# Patient Record
Sex: MALE | Race: WHITE | NOT HISPANIC OR LATINO | Employment: OTHER | URBAN - METROPOLITAN AREA
[De-identification: names, ages, dates, MRNs, and addresses within clinical notes are randomized per-mention and may not be internally consistent; named-entity substitution may affect disease eponyms.]

---

## 2017-01-27 PROBLEM — E78.5 DYSLIPIDEMIA: Status: ACTIVE | Noted: 2017-01-27

## 2017-02-13 ENCOUNTER — OFFICE VISIT (OUTPATIENT)
Dept: FAMILY MEDICINE CLINIC | Facility: CLINIC | Age: 62
End: 2017-02-13

## 2017-02-13 VITALS
WEIGHT: 204 LBS | DIASTOLIC BLOOD PRESSURE: 78 MMHG | SYSTOLIC BLOOD PRESSURE: 112 MMHG | HEART RATE: 74 BPM | HEIGHT: 72 IN | OXYGEN SATURATION: 98 % | BODY MASS INDEX: 27.63 KG/M2

## 2017-02-13 DIAGNOSIS — F98.8 ADD (ATTENTION DEFICIT DISORDER): Primary | ICD-10-CM

## 2017-02-13 PROCEDURE — 99212 OFFICE O/P EST SF 10 MIN: CPT | Performed by: INTERNAL MEDICINE

## 2017-02-13 RX ORDER — DEXTROAMPHETAMINE SACCHARATE, AMPHETAMINE ASPARTATE MONOHYDRATE, DEXTROAMPHETAMINE SULFATE AND AMPHETAMINE SULFATE 1.25; 1.25; 1.25; 1.25 MG/1; MG/1; MG/1; MG/1
5 CAPSULE, EXTENDED RELEASE ORAL 2 TIMES DAILY
Qty: 60 CAPSULE
Start: 2017-02-13 | End: 2017-07-20 | Stop reason: SDUPTHER

## 2017-02-13 NOTE — PROGRESS NOTES
Subjective   Luca Caorlina Jr. is a 61 y.o. male who presents today for:    ADD (CSE 4 month f/u )    History of Present Illness   ADD follow-up: He was diagnosed after undergoing neuropsychiatric testing in 2007. After being tried on other stimulants, Adderall 10 mg seemed to work well with no side effects. More recently, he felt that the medication was making him more wired and he would like. He thought he was on a 5 mg tablet once or twice daily; he has always been on Adderall 10 mg daily-twice a day. The second dose is taken as much on a prn basis as a regular basis, depending on his need for improved focus and concentration at work. He denies insomnia.      We added sertraline in September, 2010 for mood lability and irritability. He noticed a benefit in particular when he would run out of the medicine, with a recurrence of symptoms off the sertraline.     Mr. Carolina  reports that he has never smoked. He has never used smokeless tobacco. He reports that he drinks about 3.0 oz of alcohol per week  He reports that he does not use illicit drugs.         Current Outpatient Prescriptions:   •  amphetamine-dextroamphetamine XR (ADDERALL XR) 5 MG 24 hr capsule, Take 1 capsule by mouth 2 (Two) Times a Day., Disp: 60 capsule, Rfl: 0  •  amphetamine-dextroamphetamine XR (ADDERALL XR) 5 MG 24 hr capsule, Take 1 capsule by mouth 2 (Two) Times a Day., Disp: 60 capsule, Rfl: 0  •  atorvastatin (LIPITOR) 20 MG tablet, TAKE 1 TABLET BY MOUTH DAILY, Disp: 90 tablet, Rfl: 1  •  ibuprofen (ADVIL,MOTRIN) 200 MG tablet, Take 200 mg by mouth every 6 (six) hours as needed for mild pain (1-3)., Disp: , Rfl:   •  Loratadine 10 MG capsule, Take  by mouth., Disp: , Rfl:   •  sertraline (ZOLOFT) 25 MG tablet, TAKE 1 TABLET BY MOUTH DAILY, Disp: 30 tablet, Rfl: 6  •  Triamcinolone Acetonide (NASACORT) 55 MCG/ACT nasal inhaler, 2 sprays into each nostril daily., Disp: , Rfl:       The following portions of the patient's history were  "reviewed and updated as appropriate: allergies, current medications, past social history and problem list.    Review of Systems   Constitutional: Negative for diaphoresis.   Eyes: Negative for visual disturbance.   Respiratory: Negative for chest tightness and stridor.    Cardiovascular: Negative for chest pain and palpitations.   Gastrointestinal: Negative for abdominal pain.   Musculoskeletal: Negative for myalgias.   Neurological: Negative for dizziness, syncope, numbness and headaches.   Hematological: Does not bruise/bleed easily.   Psychiatric/Behavioral: Negative for sleep disturbance.         Objective   Vitals:    02/13/17 1140   BP: 112/78   BP Location: Left arm   Patient Position: Sitting   Cuff Size: Adult   Pulse: 74   SpO2: 98%   Weight: 204 lb (92.5 kg)   Height: 72\" (182.9 cm)     Physical Exam   Constitutional: He is oriented to person, place, and time. He appears well-developed and well-nourished.   Eyes: Conjunctivae are normal. No scleral icterus.   Neck: Carotid bruit is not present. No thyromegaly present.   Cardiovascular: Normal rate and regular rhythm.    Neurological: He is alert and oriented to person, place, and time. Coordination and gait normal.   Psychiatric: He has a normal mood and affect. His speech is normal. Judgment and thought content normal. Cognition and memory are normal.       Assessment/Plan   Luca was seen today for add.    Diagnoses and all orders for this visit:    ADD (attention deficit disorder)  -     amphetamine-dextroamphetamine XR (ADDERALL XR) 5 MG 24 hr capsule; Take 1 capsule by mouth 2 (Two) Times a Day.    MALLORIE report was obtained and reviewed during the course of today's office visit.  He seems to be doing well on the current dose of Adderall XR.  We will continue it, and the sertraline, unchanged.  2 prescriptions for Adderall XR 5 mg, #60/0 refills were given to the patient today.  "

## 2017-02-23 RX ORDER — SERTRALINE HYDROCHLORIDE 25 MG/1
TABLET, FILM COATED ORAL
Qty: 30 TABLET | Refills: 12 | Status: SHIPPED | OUTPATIENT
Start: 2017-02-23 | End: 2018-04-16 | Stop reason: SDUPTHER

## 2017-05-30 RX ORDER — ATORVASTATIN CALCIUM 20 MG/1
TABLET, FILM COATED ORAL
Qty: 90 TABLET | Refills: 0 | Status: SHIPPED | OUTPATIENT
Start: 2017-05-30 | End: 2017-09-07 | Stop reason: SDUPTHER

## 2017-07-20 ENCOUNTER — OFFICE VISIT (OUTPATIENT)
Dept: FAMILY MEDICINE CLINIC | Facility: CLINIC | Age: 62
End: 2017-07-20

## 2017-07-20 VITALS
OXYGEN SATURATION: 98 % | BODY MASS INDEX: 25.92 KG/M2 | WEIGHT: 191.4 LBS | HEART RATE: 71 BPM | DIASTOLIC BLOOD PRESSURE: 70 MMHG | HEIGHT: 72 IN | SYSTOLIC BLOOD PRESSURE: 112 MMHG

## 2017-07-20 DIAGNOSIS — Z79.899 ENCOUNTER FOR LONG-TERM (CURRENT) USE OF MEDICATIONS: ICD-10-CM

## 2017-07-20 DIAGNOSIS — F98.8 ADD (ATTENTION DEFICIT DISORDER): Primary | ICD-10-CM

## 2017-07-20 PROCEDURE — 99213 OFFICE O/P EST LOW 20 MIN: CPT | Performed by: INTERNAL MEDICINE

## 2017-07-20 RX ORDER — DEXTROAMPHETAMINE SACCHARATE, AMPHETAMINE ASPARTATE MONOHYDRATE, DEXTROAMPHETAMINE SULFATE AND AMPHETAMINE SULFATE 1.25; 1.25; 1.25; 1.25 MG/1; MG/1; MG/1; MG/1
5 CAPSULE, EXTENDED RELEASE ORAL 2 TIMES DAILY
Qty: 60 CAPSULE | Refills: 0 | Status: SHIPPED | OUTPATIENT
Start: 2017-07-20 | End: 2017-12-01 | Stop reason: SDUPTHER

## 2017-07-20 RX ORDER — DEXTROAMPHETAMINE SACCHARATE, AMPHETAMINE ASPARTATE MONOHYDRATE, DEXTROAMPHETAMINE SULFATE AND AMPHETAMINE SULFATE 1.25; 1.25; 1.25; 1.25 MG/1; MG/1; MG/1; MG/1
5 CAPSULE, EXTENDED RELEASE ORAL 2 TIMES DAILY
Qty: 60 CAPSULE | Refills: 0 | Status: SHIPPED | OUTPATIENT
Start: 2017-07-20 | End: 2017-07-20 | Stop reason: SDUPTHER

## 2017-07-20 NOTE — PROGRESS NOTES
Chief Complaint   Patient presents with   • ADD     CSE   ADD follow-up: He was diagnosed after undergoing neuropsychiatric testing in 2007. After being tried on other stimulants, Adderall 10 mg seemed to work well with no side effects. He felt that the medication was making him more wired than he would like. He thought he was on a 5 mg tablet once or twice daily; he has always been on Adderall 10 mg daily-twice a day. The second dose is taken as much on a prn basis as a regular basis, depending on his need for improved focus and concentration at work. He denies insomnia.      We added sertraline in September, 2010, for mood lability and irritability. He noticed a benefit in particular when he would run out of the medicine, with a recurrence of symptoms off the sertraline.   He ran out of medicine a couple of weeks ago with a noticeable decline in focus and increase in agitation/frustration/impatience because of it.    ROS: Weight loss by design, with decreased back pain.    Allergies   Allergen Reactions   • Penicillins Nausea Only     QUESTIONABLE REACTION       Current Outpatient Prescriptions:   •  amphetamine-dextroamphetamine XR (ADDERALL XR) 5 MG 24 hr capsule, Take 1 capsule by mouth 2 (Two) Times a Day., Disp: 60 capsule, Rfl:   •  atorvastatin (LIPITOR) 20 MG tablet, TAKE 1 TABLET BY MOUTH DAILY, Disp: 90 tablet, Rfl: 0  •  ibuprofen (ADVIL,MOTRIN) 200 MG tablet, Take 200 mg by mouth every 6 (six) hours as needed for mild pain (1-3)., Disp: , Rfl:   •  Loratadine 10 MG capsule, Take  by mouth., Disp: , Rfl:   •  sertraline (ZOLOFT) 25 MG tablet, TAKE 1 TABLET BY MOUTH DAILY, Disp: 30 tablet, Rfl: 12  •  Triamcinolone Acetonide (NASACORT) 55 MCG/ACT nasal inhaler, 2 sprays into each nostril daily., Disp: , Rfl:     He  reports that he has never smoked. He has never used smokeless tobacco. He reports that he drinks about 3.0 oz of alcohol per week  He reports that he does not use illicit drugs.    No family  "h/o substance abuse.    /70 (BP Location: Left arm, Patient Position: Sitting, Cuff Size: Adult)  Pulse 71  Ht 72\" (182.9 cm)  Wt 191 lb 6.4 oz (86.8 kg)  SpO2 98%  BMI 25.96 kg/m2    EXAM:  I'll developed, well nourished, in no acute distress.  Sclerae anicteric.  No periorbital edema.  No thyromegaly or masses or carotid bruit.  Regular rate and rhythm without murmur.  Normal S1 and S2.  No lower extremity edema.    No abdominal bruit, no hepatomegaly, and no abdominal tenderness.    Luca was seen today for add.    Diagnoses and all orders for this visit:    ADD (attention deficit disorder)  -     amphetamine-dextroamphetamine XR (ADDERALL XR) 5 MG 24 hr capsule; Take 1 capsule by mouth 2 (Two) Times a Day.  -     [id number] 7+OxycodoneWayne    Encounter for long-term (current) use of medications  -     [id number] 7+Oxycodone-Josep NOBLES report was obtained and reviewed.  He will continue with the same dose and same regimen of this medication since it has shown good benefit.  This has been most recently evidenced by the lack of benefit when he has not been on it.     We will have him follow up later this year with a preventive health visit and refill his Adderall at that time.      "

## 2017-07-21 LAB
AMPHETAMINES UR QL SCN: NEGATIVE NG/ML
BARBITURATES UR QL SCN: NEGATIVE NG/ML
BENZODIAZ UR QL: NEGATIVE NG/ML
BZE UR QL: NEGATIVE NG/ML
CANNABINOIDS UR QL SCN: NEGATIVE NG/ML
OPIATES UR QL SCN: NEGATIVE NG/ML
OXYCODONE+OXYMORPHONE UR QL SCN: NEGATIVE NG/ML
PCP UR QL: NEGATIVE NG/ML

## 2017-09-07 DIAGNOSIS — E78.2 MIXED HYPERLIPIDEMIA: Primary | ICD-10-CM

## 2017-09-07 RX ORDER — ATORVASTATIN CALCIUM 20 MG/1
TABLET, FILM COATED ORAL
Qty: 90 TABLET | Refills: 0 | Status: SHIPPED | OUTPATIENT
Start: 2017-09-07 | End: 2018-01-03 | Stop reason: SDUPTHER

## 2017-12-01 ENCOUNTER — OFFICE VISIT (OUTPATIENT)
Dept: FAMILY MEDICINE CLINIC | Facility: CLINIC | Age: 62
End: 2017-12-01

## 2017-12-01 ENCOUNTER — RESULTS ENCOUNTER (OUTPATIENT)
Dept: FAMILY MEDICINE CLINIC | Facility: CLINIC | Age: 62
End: 2017-12-01

## 2017-12-01 VITALS
BODY MASS INDEX: 25.92 KG/M2 | SYSTOLIC BLOOD PRESSURE: 116 MMHG | WEIGHT: 191.4 LBS | DIASTOLIC BLOOD PRESSURE: 82 MMHG | HEIGHT: 72 IN | HEART RATE: 68 BPM | OXYGEN SATURATION: 98 %

## 2017-12-01 DIAGNOSIS — F98.8 ATTENTION DEFICIT DISORDER (ADD) WITHOUT HYPERACTIVITY: Primary | ICD-10-CM

## 2017-12-01 DIAGNOSIS — Z23 FLU VACCINE NEED: ICD-10-CM

## 2017-12-01 DIAGNOSIS — E78.2 MIXED HYPERLIPIDEMIA: ICD-10-CM

## 2017-12-01 PROCEDURE — 99213 OFFICE O/P EST LOW 20 MIN: CPT | Performed by: INTERNAL MEDICINE

## 2017-12-01 PROCEDURE — 90471 IMMUNIZATION ADMIN: CPT | Performed by: INTERNAL MEDICINE

## 2017-12-01 PROCEDURE — 90686 IIV4 VACC NO PRSV 0.5 ML IM: CPT | Performed by: INTERNAL MEDICINE

## 2017-12-01 RX ORDER — DEXTROAMPHETAMINE SACCHARATE, AMPHETAMINE ASPARTATE MONOHYDRATE, DEXTROAMPHETAMINE SULFATE AND AMPHETAMINE SULFATE 1.25; 1.25; 1.25; 1.25 MG/1; MG/1; MG/1; MG/1
5 CAPSULE, EXTENDED RELEASE ORAL 2 TIMES DAILY
Qty: 120 CAPSULE | Refills: 0 | Status: SHIPPED | OUTPATIENT
Start: 2017-12-01 | End: 2018-01-05 | Stop reason: SDUPTHER

## 2017-12-01 RX ORDER — DEXTROAMPHETAMINE SACCHARATE, AMPHETAMINE ASPARTATE MONOHYDRATE, DEXTROAMPHETAMINE SULFATE AND AMPHETAMINE SULFATE 1.25; 1.25; 1.25; 1.25 MG/1; MG/1; MG/1; MG/1
5 CAPSULE, EXTENDED RELEASE ORAL 2 TIMES DAILY
Qty: 30 CAPSULE | Refills: 0 | Status: SHIPPED | OUTPATIENT
Start: 2017-12-01 | End: 2017-12-01 | Stop reason: SDUPTHER

## 2017-12-02 LAB
ALBUMIN SERPL-MCNC: 4.8 G/DL (ref 3.6–4.8)
ALBUMIN/GLOB SERPL: 2.5 {RATIO} (ref 1.2–2.2)
ALP SERPL-CCNC: 57 IU/L (ref 39–117)
ALT SERPL-CCNC: 18 IU/L (ref 0–44)
AST SERPL-CCNC: 23 IU/L (ref 0–40)
BILIRUB SERPL-MCNC: 0.8 MG/DL (ref 0–1.2)
BUN SERPL-MCNC: 11 MG/DL (ref 8–27)
BUN/CREAT SERPL: 14 (ref 10–24)
CALCIUM SERPL-MCNC: 9.5 MG/DL (ref 8.6–10.2)
CHLORIDE SERPL-SCNC: 99 MMOL/L (ref 96–106)
CHOLEST SERPL-MCNC: 217 MG/DL (ref 100–199)
CO2 SERPL-SCNC: 26 MMOL/L (ref 18–29)
CREAT SERPL-MCNC: 0.81 MG/DL (ref 0.76–1.27)
GFR SERPLBLD CREATININE-BSD FMLA CKD-EPI: 110 ML/MIN/1.73
GFR SERPLBLD CREATININE-BSD FMLA CKD-EPI: 95 ML/MIN/1.73
GLOBULIN SER CALC-MCNC: 1.9 G/DL (ref 1.5–4.5)
GLUCOSE SERPL-MCNC: 93 MG/DL (ref 65–99)
HDLC SERPL-MCNC: 66 MG/DL
LDLC SERPL CALC-MCNC: 133 MG/DL (ref 0–99)
POTASSIUM SERPL-SCNC: 4.1 MMOL/L (ref 3.5–5.2)
PROT SERPL-MCNC: 6.7 G/DL (ref 6–8.5)
SODIUM SERPL-SCNC: 141 MMOL/L (ref 134–144)
TRIGL SERPL-MCNC: 91 MG/DL (ref 0–149)
VLDLC SERPL CALC-MCNC: 18 MG/DL (ref 5–40)

## 2018-01-03 RX ORDER — ATORVASTATIN CALCIUM 20 MG/1
TABLET, FILM COATED ORAL
Qty: 90 TABLET | Refills: 0 | Status: SHIPPED | OUTPATIENT
Start: 2018-01-03 | End: 2018-04-13 | Stop reason: SDUPTHER

## 2018-01-05 ENCOUNTER — OFFICE VISIT (OUTPATIENT)
Dept: FAMILY MEDICINE CLINIC | Facility: CLINIC | Age: 63
End: 2018-01-05

## 2018-01-05 VITALS
WEIGHT: 194 LBS | OXYGEN SATURATION: 97 % | HEIGHT: 72 IN | BODY MASS INDEX: 26.28 KG/M2 | SYSTOLIC BLOOD PRESSURE: 126 MMHG | DIASTOLIC BLOOD PRESSURE: 92 MMHG | HEART RATE: 70 BPM

## 2018-01-05 DIAGNOSIS — F98.8 ATTENTION DEFICIT DISORDER (ADD) WITHOUT HYPERACTIVITY: ICD-10-CM

## 2018-01-05 DIAGNOSIS — E78.5 DYSLIPIDEMIA: ICD-10-CM

## 2018-01-05 DIAGNOSIS — Z12.5 PROSTATE CANCER SCREENING: ICD-10-CM

## 2018-01-05 DIAGNOSIS — Z00.00 ROUTINE GENERAL MEDICAL EXAMINATION AT HEALTH CARE FACILITY: Primary | ICD-10-CM

## 2018-01-05 PROCEDURE — 99396 PREV VISIT EST AGE 40-64: CPT | Performed by: INTERNAL MEDICINE

## 2018-01-05 RX ORDER — DEXTROAMPHETAMINE SACCHARATE, AMPHETAMINE ASPARTATE MONOHYDRATE, DEXTROAMPHETAMINE SULFATE AND AMPHETAMINE SULFATE 1.25; 1.25; 1.25; 1.25 MG/1; MG/1; MG/1; MG/1
10 CAPSULE, EXTENDED RELEASE ORAL EVERY MORNING
Qty: 60 CAPSULE | Refills: 0 | Status: SHIPPED | OUTPATIENT
Start: 2018-01-05 | End: 2018-05-02 | Stop reason: SDUPTHER

## 2018-01-05 RX ORDER — DEXTROAMPHETAMINE SACCHARATE, AMPHETAMINE ASPARTATE MONOHYDRATE, DEXTROAMPHETAMINE SULFATE AND AMPHETAMINE SULFATE 1.25; 1.25; 1.25; 1.25 MG/1; MG/1; MG/1; MG/1
10 CAPSULE, EXTENDED RELEASE ORAL EVERY MORNING
Qty: 60 CAPSULE | Refills: 0 | Status: SHIPPED | OUTPATIENT
Start: 2018-01-05 | End: 2018-01-05 | Stop reason: SDUPTHER

## 2018-01-05 NOTE — PROGRESS NOTES
Subjective   Luca Carolina Jr. is a 62 y.o. male who presents today for:    Annual Exam (PHE & review labs)    History of Present Illness        Immunization History   Administered Date(s) Administered   • Flu Vaccine Quad PF >36MO 12/01/2017   • Influenza TIV (IM) 10/06/2016   • Zoster 12/15/2015     He was diagnosed after undergoing neuropsychiatric testing in 2007. After being tried on other stimulants, Adderall seemed to work well with no side effects. He felt that the medication was making him more wired than he would like. We changed to the 5 mg dose with good benefit, but he needed a second dose to get through afternoon and evening meetings (prn).  He denies insomnia.    Mr. Carolina  reports that he has never smoked. He has never used smokeless tobacco. He reports that he drinks about 3.0 oz of alcohol per week  He reports that he does not use illicit drugs.     Allergies   Allergen Reactions   • Penicillins Nausea Only     QUESTIONABLE REACTION         Current Outpatient Prescriptions:   •  amphetamine-dextroamphetamine XR (ADDERALL XR) 5 MG 24 hr capsule, Take 1 capsule by mouth 2 (Two) Times a Day., Disp: 120 capsule, Rfl: 0  •  atorvastatin (LIPITOR) 20 MG tablet, TAKE 1 TABLET BY MOUTH DAILY, Disp: 90 tablet, Rfl: 0  •  ibuprofen (ADVIL,MOTRIN) 200 MG tablet, Take 200 mg by mouth every 6 (six) hours as needed for mild pain (1-3)., Disp: , Rfl:   •  Loratadine 10 MG capsule, Take  by mouth., Disp: , Rfl:   •  sertraline (ZOLOFT) 25 MG tablet, TAKE 1 TABLET BY MOUTH DAILY, Disp: 30 tablet, Rfl: 12  •  Triamcinolone Acetonide (NASACORT) 55 MCG/ACT nasal inhaler, 2 sprays into each nostril daily., Disp: , Rfl:       Review of Systems   Constitutional: Negative for diaphoresis.   HENT: Positive for trouble swallowing (large capsules and some foods).    Eyes: Negative for visual disturbance.   Respiratory: Negative for cough and chest tightness.    Cardiovascular: Negative for chest pain, palpitations and leg  "swelling.   Gastrointestinal: Negative for abdominal pain.   Genitourinary:        Nocturia x 3-4 nights/week.   Musculoskeletal: Negative for arthralgias and myalgias.   Neurological: Negative for dizziness, syncope, weakness, numbness and headaches.   Hematological: Does not bruise/bleed easily.   Psychiatric/Behavioral: Positive for decreased concentration. Negative for dysphoric mood. The patient is nervous/anxious.          Objective   Vitals:    01/05/18 1033   BP: 126/92   BP Location: Left arm   Patient Position: Sitting   Cuff Size: Adult   Pulse: 70   SpO2: 97%   Weight: 88 kg (194 lb)   Height: 182.9 cm (72\")     Recheck BP: 124/82    Physical Exam   Constitutional: He is oriented to person, place, and time. He appears well-developed and well-nourished.   Eyes: Conjunctivae are normal. No scleral icterus.   Neck: Carotid bruit is not present. No thyroid mass and no thyromegaly present.   Cardiovascular: Normal rate, regular rhythm, normal heart sounds and intact distal pulses.    No pedal edema.   Pulmonary/Chest: Effort normal and breath sounds normal.   Abdominal: Soft. Bowel sounds are normal. He exhibits no abdominal bruit.   Genitourinary: Rectum normal and prostate normal.   Lymphadenopathy:     He has no cervical adenopathy.   Neurological: He is alert and oriented to person, place, and time. He has normal strength.   Skin:   Moderate sun-exposure changes on the trunk and extremities, but no suspicious lesions noted.     Psychiatric: He has a normal mood and affect. His behavior is normal. Judgment and thought content normal.         Assessment/Plan   Luca was seen today for annual exam.    Diagnoses and all orders for this visit:    Routine general medical examination at health care facility    Prostate cancer screening    Attention deficit disorder (ADD) without hyperactivity  -     amphetamine-dextroamphetamine XR (ADDERALL XR) 5 MG 24 hr capsule; Take 2 capsules by mouth Every " Morning    Dyslipidemia    We discussed age-appropriate health maintenance issues and reviewed labs that were done in anticipation of today's office visit.  We will track down the results of his most recent colonoscopy and provide further guidance regarding the timing of the next one.  He will try to provide us with a copy of his immunization record from the [facility].    He is doing well on the Adderall.  2 prescriptions were given to him today.  He will call when he needs his third.  MALLORIE report was obtained and reviewed.

## 2018-04-16 RX ORDER — ATORVASTATIN CALCIUM 20 MG/1
TABLET, FILM COATED ORAL
Qty: 90 TABLET | Refills: 0 | Status: SHIPPED | OUTPATIENT
Start: 2018-04-16 | End: 2018-07-18 | Stop reason: SDUPTHER

## 2018-04-17 RX ORDER — SERTRALINE HYDROCHLORIDE 25 MG/1
TABLET, FILM COATED ORAL
Qty: 90 TABLET | Refills: 0 | Status: SHIPPED | OUTPATIENT
Start: 2018-04-17 | End: 2018-07-18 | Stop reason: SDUPTHER

## 2018-05-02 ENCOUNTER — OFFICE VISIT (OUTPATIENT)
Dept: FAMILY MEDICINE CLINIC | Facility: CLINIC | Age: 63
End: 2018-05-02

## 2018-05-02 VITALS
DIASTOLIC BLOOD PRESSURE: 64 MMHG | OXYGEN SATURATION: 98 % | HEART RATE: 77 BPM | HEIGHT: 72 IN | BODY MASS INDEX: 26.05 KG/M2 | SYSTOLIC BLOOD PRESSURE: 106 MMHG | WEIGHT: 192.3 LBS

## 2018-05-02 DIAGNOSIS — F98.8 ATTENTION DEFICIT DISORDER (ADD) WITHOUT HYPERACTIVITY: Primary | ICD-10-CM

## 2018-05-02 DIAGNOSIS — M54.50 ACUTE LEFT-SIDED LOW BACK PAIN WITHOUT SCIATICA: ICD-10-CM

## 2018-05-02 DIAGNOSIS — Z79.899 ENCOUNTER FOR LONG-TERM (CURRENT) USE OF MEDICATIONS: ICD-10-CM

## 2018-05-02 PROCEDURE — 99213 OFFICE O/P EST LOW 20 MIN: CPT | Performed by: INTERNAL MEDICINE

## 2018-05-02 RX ORDER — DEXTROAMPHETAMINE SACCHARATE, AMPHETAMINE ASPARTATE MONOHYDRATE, DEXTROAMPHETAMINE SULFATE AND AMPHETAMINE SULFATE 1.25; 1.25; 1.25; 1.25 MG/1; MG/1; MG/1; MG/1
10 CAPSULE, EXTENDED RELEASE ORAL EVERY MORNING
Qty: 60 CAPSULE | Refills: 0 | Status: SHIPPED | OUTPATIENT
Start: 2018-05-02 | End: 2018-05-02 | Stop reason: SDUPTHER

## 2018-05-02 RX ORDER — DEXTROAMPHETAMINE SACCHARATE, AMPHETAMINE ASPARTATE MONOHYDRATE, DEXTROAMPHETAMINE SULFATE AND AMPHETAMINE SULFATE 1.25; 1.25; 1.25; 1.25 MG/1; MG/1; MG/1; MG/1
10 CAPSULE, EXTENDED RELEASE ORAL EVERY MORNING
Qty: 60 CAPSULE | Refills: 0 | Status: SHIPPED | OUTPATIENT
Start: 2018-05-02 | End: 2018-07-12 | Stop reason: SDUPTHER

## 2018-05-02 NOTE — PROGRESS NOTES
"ADD (CSE) and Back Pain (Left side)    He was diagnosed with ADD after undergoing neuropsychiatric testing in 2007. After being tried on other stimulants, Adderall seemed to work well with no side effects. He felt that the medication was making him more wired than he would like. We changed to the 5 mg dose with good benefit, but he needed a second dose to get through afternoon and evening meetings (prn).  He denies insomnia.    He c/o low back pain with radiation to the left thigh and buttock.  It is worse when sleeping on the left side and has been fairly constant for the last 6 weeks.  It has been an issue for several years, but more intermittently.  Taking Advil qod.  Topical remedy also helped him sleep one night.    ROS:   No leg weakness.  No LE numbness or tingling.  No palpitations.     Allergies   Allergen Reactions   • Penicillins Nausea Only     QUESTIONABLE REACTION       Current Outpatient Prescriptions:   •  amphetamine-dextroamphetamine XR (ADDERALL XR) 5 MG 24 hr capsule, Take 2 capsules by mouth Every Morning, Disp: 60 capsule, Rfl: 0  •  atorvastatin (LIPITOR) 20 MG tablet, TAKE 1 TABLET BY MOUTH DAILY, Disp: 90 tablet, Rfl: 0  •  ibuprofen (ADVIL,MOTRIN) 200 MG tablet, Take 200 mg by mouth every 6 (six) hours as needed for mild pain (1-3)., Disp: , Rfl:   •  Loratadine 10 MG capsule, Take  by mouth., Disp: , Rfl:   •  sertraline (ZOLOFT) 25 MG tablet, TAKE 1 TABLET BY MOUTH DAILY, Disp: 90 tablet, Rfl: 0  •  Triamcinolone Acetonide (NASACORT) 55 MCG/ACT nasal inhaler, 2 sprays into each nostril daily., Disp: , Rfl:     /64 (BP Location: Left arm, Patient Position: Sitting, Cuff Size: Large Adult)   Pulse 77   Ht 182.9 cm (72\")   Wt 87.2 kg (192 lb 4.8 oz)   SpO2 98%   BMI 26.08 kg/m²     EXAM:    Well-developed, well-nourished, in no acute distress.  Mood is upbeat and affect is appropriate.  Regular rate and rhythm. No murmur appreciated.  Insight and judgment are intact.  Nontender to " palpation directly over the spine in the lumbar area. Mildly tender over the SI areas bilaterally. Mildly tender over the lumbosacral muscles. Loss of lumbar lordosis noted.  Tight hamstrings bilaterally.  No pain with range of motion of the lumbar spine nor at the hips bilaterally.  Straight leg raise is negative and bowstring sign is negative bilaterally. Lower extremity strength is 5+/5 bilaterally.        Luca was seen today for add and back pain.    Diagnoses and all orders for this visit:    Attention deficit disorder (ADD) without hyperactivity  -     amphetamine-dextroamphetamine XR (ADDERALL XR) 5 MG 24 hr capsule; Take 2 capsules by mouth Every Morning  Continue the Adderall unchanged. This appears to be working well at the current dose/regimen.    Encounter for long-term (current) use of medications  UDT ordered today.  MALLORIE report was obtained and reviewed.    Acute left-sided low back pain without sciatica  -     Ambulatory Referral to Physical Therapy Evaluate and treat  Stretches demonstrated for the patient today. He should perform those on a daily basis. He should avoid strengthening exercises until we get the pain better controlled.

## 2018-05-03 LAB
AMPHETAMINES UR QL SCN: NEGATIVE NG/ML
BARBITURATES UR QL SCN: NEGATIVE NG/ML
BENZODIAZ UR QL SCN: NEGATIVE NG/ML
BZE UR QL: NEGATIVE NG/ML
CANNABINOIDS UR QL SCN: NEGATIVE NG/ML
OPIATES UR QL SCN: NEGATIVE NG/ML
OXYCODONE+OXYMORPHONE UR QL SCN: NEGATIVE NG/ML
PCP UR QL: NEGATIVE NG/ML

## 2018-07-12 DIAGNOSIS — F98.8 ATTENTION DEFICIT DISORDER (ADD) WITHOUT HYPERACTIVITY: ICD-10-CM

## 2018-07-15 RX ORDER — DEXTROAMPHETAMINE SACCHARATE, AMPHETAMINE ASPARTATE MONOHYDRATE, DEXTROAMPHETAMINE SULFATE AND AMPHETAMINE SULFATE 1.25; 1.25; 1.25; 1.25 MG/1; MG/1; MG/1; MG/1
10 CAPSULE, EXTENDED RELEASE ORAL EVERY MORNING
Qty: 60 CAPSULE | Refills: 0 | Status: SHIPPED | OUTPATIENT
Start: 2018-07-15 | End: 2018-08-23 | Stop reason: SDUPTHER

## 2018-07-18 RX ORDER — ATORVASTATIN CALCIUM 20 MG/1
TABLET, FILM COATED ORAL
Qty: 90 TABLET | Refills: 1 | Status: SHIPPED | OUTPATIENT
Start: 2018-07-18 | End: 2018-12-26 | Stop reason: SDUPTHER

## 2018-07-18 RX ORDER — SERTRALINE HYDROCHLORIDE 25 MG/1
TABLET, FILM COATED ORAL
Qty: 90 TABLET | Refills: 1 | Status: SHIPPED | OUTPATIENT
Start: 2018-07-18 | End: 2019-01-20 | Stop reason: SDUPTHER

## 2018-08-23 ENCOUNTER — OFFICE VISIT (OUTPATIENT)
Dept: FAMILY MEDICINE CLINIC | Facility: CLINIC | Age: 63
End: 2018-08-23

## 2018-08-23 VITALS
OXYGEN SATURATION: 98 % | BODY MASS INDEX: 26.25 KG/M2 | WEIGHT: 193.8 LBS | HEIGHT: 72 IN | HEART RATE: 73 BPM | DIASTOLIC BLOOD PRESSURE: 86 MMHG | SYSTOLIC BLOOD PRESSURE: 118 MMHG

## 2018-08-23 DIAGNOSIS — F98.8 ATTENTION DEFICIT DISORDER (ADD) WITHOUT HYPERACTIVITY: Primary | ICD-10-CM

## 2018-08-23 DIAGNOSIS — Z79.899 ENCOUNTER FOR LONG-TERM (CURRENT) USE OF MEDICATIONS: ICD-10-CM

## 2018-08-23 DIAGNOSIS — M54.50 ACUTE LEFT-SIDED LOW BACK PAIN WITHOUT SCIATICA: ICD-10-CM

## 2018-08-23 PROCEDURE — 99213 OFFICE O/P EST LOW 20 MIN: CPT | Performed by: INTERNAL MEDICINE

## 2018-08-23 RX ORDER — DEXTROAMPHETAMINE SACCHARATE, AMPHETAMINE ASPARTATE MONOHYDRATE, DEXTROAMPHETAMINE SULFATE AND AMPHETAMINE SULFATE 1.25; 1.25; 1.25; 1.25 MG/1; MG/1; MG/1; MG/1
10 CAPSULE, EXTENDED RELEASE ORAL EVERY MORNING
Qty: 60 CAPSULE | Refills: 0 | Status: SHIPPED | OUTPATIENT
Start: 2018-08-23 | End: 2018-08-23 | Stop reason: SDUPTHER

## 2018-08-23 RX ORDER — DEXTROAMPHETAMINE SACCHARATE, AMPHETAMINE ASPARTATE MONOHYDRATE, DEXTROAMPHETAMINE SULFATE AND AMPHETAMINE SULFATE 1.25; 1.25; 1.25; 1.25 MG/1; MG/1; MG/1; MG/1
10 CAPSULE, EXTENDED RELEASE ORAL EVERY MORNING
Qty: 60 CAPSULE | Refills: 0 | Status: SHIPPED | OUTPATIENT
Start: 2018-08-23 | End: 2018-11-27 | Stop reason: SDUPTHER

## 2018-08-23 NOTE — PROGRESS NOTES
Subjective   Luca Carolina Jr. is a 62 y.o. male who presents today for:    ADD (CSE)    History of Present Illness   He was diagnosed with ADD after undergoing neuropsychiatric testing in 2007. After being tried on other stimulants, Adderall seemed to work well with no side effects. He felt that the medication was making him more wired than he would like. We changed to the 5 mg dose with good benefit, but he needed a second dose to get through afternoon and evening meetings (prn).  He denies insomnia.    He c/o low back pain with radiation to the left thigh and buttock since mid.  It is worse when sleeping on the left side and has been fairly constant since mid-March.  It has been an issue for several years, but more intermittently.  Taking Advil qod with marginal benefit.  PT at [facility] did not help with the pain.  No trauma, recent or remote      Mr. Carolina  reports that he has never smoked. He has never used smokeless tobacco. He reports that he drinks about 3.0 oz of alcohol per week . He reports that he does not use drugs.     Allergies   Allergen Reactions   • Penicillins Nausea Only     QUESTIONABLE REACTION         Current Outpatient Prescriptions:   •  amphetamine-dextroamphetamine XR (ADDERALL XR) 5 MG 24 hr capsule, Take 2 capsules by mouth Every Morning (Patient taking differently: Take 5 mg by mouth Every Morning  ), Disp: 60 capsule, Rfl: 0  •  atorvastatin (LIPITOR) 20 MG tablet, TAKE 1 TABLET BY MOUTH DAILY, Disp: 90 tablet, Rfl: 1  •  ibuprofen (ADVIL,MOTRIN) 200 MG tablet, Take 200 mg by mouth every 6 (six) hours as needed for mild pain (1-3)., Disp: , Rfl:   •  Loratadine 10 MG capsule, Take 1 tablet by mouth Daily., Disp: , Rfl:   •  sertraline (ZOLOFT) 25 MG tablet, TAKE 1 TABLET BY MOUTH DAILY, Disp: 90 tablet, Rfl: 1  •  Triamcinolone Acetonide (NASACORT) 55 MCG/ACT nasal inhaler, 2 sprays into each nostril daily., Disp: , Rfl:       Review of Systems   Constitutional: Negative for unexpected  "weight change.   Genitourinary: Negative for enuresis.   Musculoskeletal: Positive for back pain. Negative for joint swelling and myalgias.   Neurological: Negative for weakness and numbness.   Psychiatric/Behavioral: Negative for suicidal ideas.         Objective   Vitals:    08/23/18 1148   BP: 118/86   BP Location: Right arm   Patient Position: Sitting   Cuff Size: Adult   Pulse: 73   SpO2: 98%   Weight: 87.9 kg (193 lb 12.8 oz)   Height: 182.9 cm (72\")     Physical Exam   Constitutional: He is oriented to person, place, and time. He appears well-developed and well-nourished. No distress.   Cardiovascular: Normal rate, regular rhythm and normal heart sounds.    Neurological: He is alert and oriented to person, place, and time. Coordination normal.   Negative straight leg raise and negative bowstring sign bilaterally  Deep tendon reflexes at the patella and Achilles are 2/4 bilaterally.  Lower extremity strength is 5+/5 bilaterally.   Skin:   No rash or ecchymosis about the back.   Psychiatric: He has a normal mood and affect. His behavior is normal. Judgment and thought content normal.             Luca was seen today for add.    Diagnoses and all orders for this visit:    Attention deficit disorder (ADD) without hyperactivity  -     amphetamine-dextroamphetamine XR (ADDERALL XR) 5 MG 24 hr capsule; Take 2 capsules by mouth Every Morning    Encounter for long-term (current) use of medications    Acute left-sided low back pain without sciatica  -     XR Spine Lumbar 4+ View    MALLORIE report was obtained and reviewed during the course of today's office visit.  He continues to do well with the Adderall.  Actually takes it less often and is noted above.  There has been no evidence of abuse.  Therefore, we will continue to prescribe such that he can take a second dose when needed (#60).      He will go to the hospital to get x-rays of his low back.  He may benefit from chiropractic therapy thereafter.  It does not " sound like he needs surgery or an injection at this time.  Further recommendations will follow after we see the x-rays.

## 2018-11-27 DIAGNOSIS — F98.8 ATTENTION DEFICIT DISORDER (ADD) WITHOUT HYPERACTIVITY: ICD-10-CM

## 2018-11-27 RX ORDER — DEXTROAMPHETAMINE SACCHARATE, AMPHETAMINE ASPARTATE MONOHYDRATE, DEXTROAMPHETAMINE SULFATE AND AMPHETAMINE SULFATE 1.25; 1.25; 1.25; 1.25 MG/1; MG/1; MG/1; MG/1
10 CAPSULE, EXTENDED RELEASE ORAL EVERY MORNING
Qty: 60 CAPSULE | Refills: 0 | Status: SHIPPED | OUTPATIENT
Start: 2018-11-27 | End: 2018-11-27 | Stop reason: SDUPTHER

## 2018-11-27 RX ORDER — DEXTROAMPHETAMINE SACCHARATE, AMPHETAMINE ASPARTATE MONOHYDRATE, DEXTROAMPHETAMINE SULFATE AND AMPHETAMINE SULFATE 1.25; 1.25; 1.25; 1.25 MG/1; MG/1; MG/1; MG/1
10 CAPSULE, EXTENDED RELEASE ORAL EVERY MORNING
Qty: 60 CAPSULE | Refills: 0 | Status: SHIPPED | OUTPATIENT
Start: 2018-11-27 | End: 2019-01-08 | Stop reason: SDUPTHER

## 2018-12-26 RX ORDER — ATORVASTATIN CALCIUM 20 MG/1
TABLET, FILM COATED ORAL
Qty: 90 TABLET | Refills: 3 | Status: SHIPPED | OUTPATIENT
Start: 2018-12-26 | End: 2019-12-05 | Stop reason: SDUPTHER

## 2018-12-28 DIAGNOSIS — Z12.5 PROSTATE CANCER SCREENING: ICD-10-CM

## 2018-12-28 DIAGNOSIS — Z00.00 ROUTINE GENERAL MEDICAL EXAMINATION AT HEALTH CARE FACILITY: Primary | ICD-10-CM

## 2018-12-30 ENCOUNTER — RESULTS ENCOUNTER (OUTPATIENT)
Dept: FAMILY MEDICINE CLINIC | Facility: CLINIC | Age: 63
End: 2018-12-30

## 2018-12-30 DIAGNOSIS — Z00.00 ROUTINE GENERAL MEDICAL EXAMINATION AT HEALTH CARE FACILITY: ICD-10-CM

## 2018-12-30 DIAGNOSIS — Z12.5 PROSTATE CANCER SCREENING: ICD-10-CM

## 2019-01-02 LAB
ALBUMIN SERPL-MCNC: 4.7 G/DL (ref 3.5–5.2)
ALBUMIN/GLOB SERPL: 2.2 G/DL
ALP SERPL-CCNC: 55 U/L (ref 39–117)
ALT SERPL-CCNC: 38 U/L (ref 1–41)
APPEARANCE UR: CLEAR
AST SERPL-CCNC: 31 U/L (ref 1–40)
BILIRUB SERPL-MCNC: 0.7 MG/DL (ref 0.1–1.2)
BILIRUB UR QL STRIP: NEGATIVE
BUN SERPL-MCNC: 14 MG/DL (ref 8–23)
BUN/CREAT SERPL: 17.1 (ref 7–25)
CALCIUM SERPL-MCNC: 9.5 MG/DL (ref 8.6–10.5)
CHLORIDE SERPL-SCNC: 103 MMOL/L (ref 98–107)
CHOLEST SERPL-MCNC: 197 MG/DL (ref 0–200)
CO2 SERPL-SCNC: 26.3 MMOL/L (ref 22–29)
COLOR UR: YELLOW
CREAT SERPL-MCNC: 0.82 MG/DL (ref 0.76–1.27)
ERYTHROCYTE [DISTWIDTH] IN BLOOD BY AUTOMATED COUNT: 12.7 % (ref 11.5–14.5)
GLOBULIN SER CALC-MCNC: 2.1 GM/DL
GLUCOSE SERPL-MCNC: 103 MG/DL (ref 65–99)
GLUCOSE UR QL: NEGATIVE
HCT VFR BLD AUTO: 41.1 % (ref 40.4–52.2)
HDLC SERPL-MCNC: 62 MG/DL (ref 40–60)
HGB BLD-MCNC: 14.1 G/DL (ref 13.7–17.6)
HGB UR QL STRIP: NEGATIVE
KETONES UR QL STRIP: NEGATIVE
LDLC SERPL CALC-MCNC: 106 MG/DL (ref 0–100)
LEUKOCYTE ESTERASE UR QL STRIP: NEGATIVE
MCH RBC QN AUTO: 32 PG (ref 27–32.7)
MCHC RBC AUTO-ENTMCNC: 34.3 G/DL (ref 32.6–36.4)
MCV RBC AUTO: 93.4 FL (ref 79.8–96.2)
NITRITE UR QL STRIP: NEGATIVE
PH UR STRIP: 6 [PH] (ref 5–8)
PLATELET # BLD AUTO: 183 10*3/MM3 (ref 140–500)
POTASSIUM SERPL-SCNC: 4.8 MMOL/L (ref 3.5–5.2)
PROT SERPL-MCNC: 6.8 G/DL (ref 6–8.5)
PROT UR QL STRIP: NEGATIVE
PSA SERPL-MCNC: 1.93 NG/ML (ref 0–4)
RBC # BLD AUTO: 4.4 10*6/MM3 (ref 4.6–6)
SODIUM SERPL-SCNC: 140 MMOL/L (ref 136–145)
SP GR UR: 1.02 (ref 1–1.03)
TRIGL SERPL-MCNC: 143 MG/DL (ref 0–150)
UROBILINOGEN UR STRIP-MCNC: NORMAL MG/DL
VLDLC SERPL CALC-MCNC: 28.6 MG/DL (ref 5–40)
WBC # BLD AUTO: 5.37 10*3/MM3 (ref 4.5–10.7)

## 2019-01-08 ENCOUNTER — OFFICE VISIT (OUTPATIENT)
Dept: FAMILY MEDICINE CLINIC | Facility: CLINIC | Age: 64
End: 2019-01-08

## 2019-01-08 VITALS
DIASTOLIC BLOOD PRESSURE: 68 MMHG | HEART RATE: 79 BPM | OXYGEN SATURATION: 98 % | SYSTOLIC BLOOD PRESSURE: 100 MMHG | HEIGHT: 72 IN | BODY MASS INDEX: 27.16 KG/M2 | WEIGHT: 200.5 LBS

## 2019-01-08 DIAGNOSIS — F98.8 ATTENTION DEFICIT DISORDER (ADD) WITHOUT HYPERACTIVITY: ICD-10-CM

## 2019-01-08 DIAGNOSIS — Z12.5 PROSTATE CANCER SCREENING: ICD-10-CM

## 2019-01-08 DIAGNOSIS — J30.89 NON-SEASONAL ALLERGIC RHINITIS, UNSPECIFIED TRIGGER: ICD-10-CM

## 2019-01-08 DIAGNOSIS — E78.5 DYSLIPIDEMIA: ICD-10-CM

## 2019-01-08 DIAGNOSIS — Z12.11 COLON CANCER SCREENING: ICD-10-CM

## 2019-01-08 DIAGNOSIS — Z23 NEED FOR TETANUS, DIPHTHERIA, AND ACELLULAR PERTUSSIS (TDAP) VACCINE IN PATIENT OF ADOLESCENT AGE OR OLDER: ICD-10-CM

## 2019-01-08 DIAGNOSIS — Z00.00 ROUTINE GENERAL MEDICAL EXAMINATION AT HEALTH CARE FACILITY: Primary | ICD-10-CM

## 2019-01-08 PROCEDURE — 99396 PREV VISIT EST AGE 40-64: CPT | Performed by: INTERNAL MEDICINE

## 2019-01-08 PROCEDURE — 90715 TDAP VACCINE 7 YRS/> IM: CPT | Performed by: INTERNAL MEDICINE

## 2019-01-08 PROCEDURE — 90471 IMMUNIZATION ADMIN: CPT | Performed by: INTERNAL MEDICINE

## 2019-01-08 RX ORDER — DEXTROAMPHETAMINE SACCHARATE, AMPHETAMINE ASPARTATE MONOHYDRATE, DEXTROAMPHETAMINE SULFATE AND AMPHETAMINE SULFATE 1.25; 1.25; 1.25; 1.25 MG/1; MG/1; MG/1; MG/1
10 CAPSULE, EXTENDED RELEASE ORAL EVERY MORNING
Qty: 60 CAPSULE | Refills: 0 | Status: SHIPPED | OUTPATIENT
Start: 2019-01-08 | End: 2019-11-06 | Stop reason: SDUPTHER

## 2019-01-08 RX ORDER — MONTELUKAST SODIUM 10 MG/1
10 TABLET ORAL NIGHTLY
Qty: 30 TABLET | Refills: 12 | Status: SHIPPED | OUTPATIENT
Start: 2019-01-08 | End: 2019-02-12

## 2019-01-08 RX ORDER — DEXTROAMPHETAMINE SACCHARATE, AMPHETAMINE ASPARTATE MONOHYDRATE, DEXTROAMPHETAMINE SULFATE AND AMPHETAMINE SULFATE 1.25; 1.25; 1.25; 1.25 MG/1; MG/1; MG/1; MG/1
10 CAPSULE, EXTENDED RELEASE ORAL EVERY MORNING
Qty: 60 CAPSULE | Refills: 0 | Status: SHIPPED | OUTPATIENT
Start: 2019-01-08 | End: 2019-01-08 | Stop reason: SDUPTHER

## 2019-01-08 NOTE — PROGRESS NOTES
Subjective   Luca Carolina Jr. is a 63 y.o. male who presents today for:    Annual Exam (PHE & review labs) and ADD (CSE)    History of Present Illness     He presents today for a preventive health evaluation.    Last colonoscopy was in 2009 and was normal.  He denies any bowel problems.    Immunizations have been kept up-to-date through the [facility].  His last tetanus booster was reportedly in 2006, so he is due for that at this time.    He was diagnosed with ADD after undergoing neuropsychiatric testing in 2007. After being tried on other stimulants, Adderall seemed to work well with no side effects. He felt that the medication was making him more wired than he would like. We changed to the 5 mg dose with good benefit, but he needed a second dose to get through afternoon and evening meetings (prn).  He denies insomnia.    We also reports a chronic cough that has been present for months if not years.  It fluctuates, but has been present continuously for the past 2 months.  Daily loratadine and occasional Nasacort have not helped.  He has seen an allergist in the remote past.  Allergy injections did not help.  He feels the cough stems from continuous drainage in the back of his throat.  He injection and clears his throat often.    He has intermittent dysphagia, usually with pills in a certain shape and size.  He occasionally has reflux symptoms as well, but does not take over-the-counter antacids with any regularity.    Mr. Carolina  reports that  has never smoked. he has never used smokeless tobacco. He reports that he drinks about 3.0 oz of alcohol per week. He reports that he does not use drugs.     Allergies   Allergen Reactions   • Penicillins Nausea Only     QUESTIONABLE REACTION         Current Outpatient Medications:   •  amphetamine-dextroamphetamine XR (ADDERALL XR) 5 MG 24 hr capsule, Take 2 capsules by mouth Every Morning, Disp: 60 capsule, Rfl: 0  •  atorvastatin  "(LIPITOR) 20 MG tablet, TAKE 1 TABLET BY MOUTH DAILY, Disp: 90 tablet, Rfl: 3  •  ibuprofen (ADVIL,MOTRIN) 200 MG tablet, Take 200 mg by mouth every 6 (six) hours as needed for mild pain (1-3)., Disp: , Rfl:   •  Loratadine 10 MG capsule, Take 1 tablet by mouth Daily., Disp: , Rfl:   •  sertraline (ZOLOFT) 25 MG tablet, TAKE 1 TABLET BY MOUTH DAILY, Disp: 90 tablet, Rfl: 1  •  Triamcinolone Acetonide (NASACORT) 55 MCG/ACT nasal inhaler, 2 sprays into each nostril daily., Disp: , Rfl:       Review of Systems   Constitutional: Positive for unexpected weight change. Negative for chills, fatigue and fever.   HENT: Positive for congestion, postnasal drip, rhinorrhea and trouble swallowing (mainly larger pills, progressively worsening).    Eyes: Negative for visual disturbance.   Respiratory: Positive for cough. Negative for shortness of breath and wheezing.    Cardiovascular: Negative for chest pain and palpitations.   Gastrointestinal: Negative for abdominal pain, blood in stool, constipation and diarrhea.   Genitourinary: Negative for decreased urine volume and difficulty urinating.   Musculoskeletal: Negative for arthralgias, back pain, gait problem and myalgias.   Skin:        Recurring lesion on the left hand   Allergic/Immunologic: Positive for environmental allergies.   Neurological: Negative for syncope and weakness.   Psychiatric/Behavioral: Positive for decreased concentration. Negative for dysphoric mood and sleep disturbance. The patient is not nervous/anxious.          Objective   Vitals:    01/08/19 1131   BP: 100/68   BP Location: Left arm   Patient Position: Sitting   Cuff Size: Large Adult   Pulse: 79   SpO2: 98%   Weight: 90.9 kg (200 lb 8 oz)   Height: 182.9 cm (72\")     Physical Exam   Constitutional: He is oriented to person, place, and time. He appears well-developed and well-nourished.   Eyes: Conjunctivae are normal. No scleral icterus.   Neck: Carotid bruit is not present. No thyroid mass and no " thyromegaly present.   Cardiovascular: Normal rate, regular rhythm, normal heart sounds and intact distal pulses.   No pedal edema.   Pulmonary/Chest: Effort normal and breath sounds normal.   Abdominal: Soft. Bowel sounds are normal. He exhibits no abdominal bruit.   Neurological: He is alert and oriented to person, place, and time. He has normal strength.   Skin:   Moderate sun-exposure changes on the trunk and extremities; 3 mm dark, raised papule on the dorsum of the left hand, near the 2nd MCP joint..     Psychiatric: He has a normal mood and affect.   Boggy turbinates with clear discharge.  Oropharynx shows mild cobblestoning with no erythema or exudates.          Luca was seen today for annual exam and add.    Diagnoses and all orders for this visit:    Routine general medical examination at health care facility  We discussed age-appropriate health maintenance issues and reviewed labs that were done prior to today's office visit.  He will continue with his regular exercise, continue to wear sunscreen regularly, wear seatbelts regularly, and get a colonoscopy this year.  He will work on his diet and try to drop about 10 pounds this year.  This should also help with the dyslipidemia for which he takes atorvastatin.    Prostate cancer screening  PSA is fine at 1.9.  Recheck next year.    Colon cancer screening  -     Ambulatory Referral to General Surgery  We will ask Dr. Zacarias to consider an EGD at the same time because of the dysphagia this patient experiences, usually with certain pills.    Need for tetanus, diphtheria, and acellular pertussis (Tdap) vaccine in patient of adolescent age or older  -     Tdap Vaccine Greater Than or Equal To 6yo IM    Attention deficit disorder (ADD) without hyperactivity  -     Discontinue: amphetamine-dextroamphetamine XR (ADDERALL XR) 5 MG 24 hr capsule; Take 2 capsules by mouth Every Morning  -     amphetamine-dextroamphetamine XR (ADDERALL XR) 5 MG 24 hr capsule; Take  2 capsules by mouth Every Morning  MALLORIE report was obtained and reviewed during the course of today's office visit.  He continues to do well with the Adderall XR 5 mg once or twice a day.  Prescriptions were given to the patient today.  He will need to discuss with his new primary care provider continued use of this medication, but it has given him very good benefit for over 10 years, without side effect.  There has been no evidence of abuse or misuse.    Non-seasonal allergic rhinitis, unspecified trigger  -     montelukast (SINGULAIR) 10 MG tablet; Take 1 tablet by mouth Every Night.  See patient instructions.

## 2019-01-22 RX ORDER — SERTRALINE HYDROCHLORIDE 25 MG/1
TABLET, FILM COATED ORAL
Qty: 90 TABLET | Refills: 3 | Status: SHIPPED | OUTPATIENT
Start: 2019-01-22 | End: 2019-11-06 | Stop reason: SDUPTHER

## 2019-02-12 ENCOUNTER — OFFICE VISIT (OUTPATIENT)
Dept: SURGERY | Facility: CLINIC | Age: 64
End: 2019-02-12

## 2019-02-12 VITALS — HEART RATE: 70 BPM | OXYGEN SATURATION: 98 % | HEIGHT: 72 IN | WEIGHT: 198.6 LBS | BODY MASS INDEX: 26.9 KG/M2

## 2019-02-12 DIAGNOSIS — K21.9 GASTROESOPHAGEAL REFLUX DISEASE, ESOPHAGITIS PRESENCE NOT SPECIFIED: Primary | ICD-10-CM

## 2019-02-12 DIAGNOSIS — Z12.11 ENCOUNTER FOR SCREENING COLONOSCOPY: ICD-10-CM

## 2019-02-12 PROCEDURE — 99242 OFF/OP CONSLTJ NEW/EST SF 20: CPT | Performed by: SURGERY

## 2019-02-12 NOTE — PROGRESS NOTES
Subjective   Luca Carolina Jr. is a 63 y.o. male who presents to the office in surgical consultation from Jorge Castro MD for dysphagia.    History of Present Illness     The patient has symptoms of dysphagia that have been worsening over the past several months.  It is primarily when he takes a large pill that it gets stuck.  He then has to cough the patella and chew.  He is able to eat food without any problems but he must drink fluid with it to keep the food going down smoothly.  He has never had a meat impaction.  He eats meat without difficulty except for the requirement of drinking.  He has intermittent GERD symptoms and has tried over-the-counter medications with no improvement.    The patient had a colonoscopy 10 years ago that was normal.  He has no significant GI symptoms.    Review of Systems   Constitutional: Negative for activity change, appetite change, fatigue and fever.   HENT: Positive for trouble swallowing. Negative for voice change.    Respiratory: Negative for chest tightness and shortness of breath.    Cardiovascular: Negative for chest pain and palpitations.   Gastrointestinal: Negative for abdominal pain, blood in stool, constipation, diarrhea, nausea and vomiting.   Endocrine: Negative for cold intolerance and heat intolerance.   Genitourinary: Negative for dysuria and flank pain.   Neurological: Negative for dizziness and light-headedness.   Hematological: Negative for adenopathy. Does not bruise/bleed easily.   Psychiatric/Behavioral: Negative for agitation and confusion.     Past Medical History:   Diagnosis Date   • ADD (attention deficit disorder) 6/10/2016   • Allergic rhinitis due to pollen 6/10/2016   • HLD (hyperlipidemia) 6/10/2016     Past Surgical History:   Procedure Laterality Date   • COLONOSCOPY  03/18/2009    LAURIE Maya   • COLONOSCOPY N/A 2009    Dr. Zacarias     Family History   Problem Relation Age of Onset   • No Known Problems Mother    • Aortic  stenosis Father         complications   • ADD / ADHD Daughter    • Anxiety disorder Son    • No Known Problems Sister    • No Known Problems Brother    • No Known Problems Sister    • Drug abuse Neg Hx      Social History     Socioeconomic History   • Marital status:      Spouse name: Not on file   • Number of children: Not on file   • Years of education: Not on file   • Highest education level: Not on file   Social Needs   • Financial resource strain: Not on file   • Food insecurity - worry: Not on file   • Food insecurity - inability: Not on file   • Transportation needs - medical: Not on file   • Transportation needs - non-medical: Not on file   Occupational History   • Occupation: Sales   Tobacco Use   • Smoking status: Never Smoker   • Smokeless tobacco: Never Used   Substance and Sexual Activity   • Alcohol use: Yes     Alcohol/week: 3.0 oz     Types: 5 Cans of beer per week   • Drug use: No   • Sexual activity: Defer   Other Topics Concern   • Not on file   Social History Narrative   • Not on file       Objective   Physical Exam   Constitutional: He is oriented to person, place, and time. He appears well-developed and well-nourished.  Non-toxic appearance.   Eyes: EOM are normal. No scleral icterus.   Pulmonary/Chest: Effort normal. No respiratory distress.   Abdominal: Soft. Normal appearance. There is no tenderness.   Neurological: He is alert and oriented to person, place, and time.   Skin: Skin is warm and dry.   Psychiatric: He has a normal mood and affect. His behavior is normal. Judgment and thought content normal.       Assessment/Plan       The primary encounter diagnosis was Gastroesophageal reflux disease, esophagitis presence not specified. A diagnosis of Encounter for screening colonoscopy was also pertinent to this visit.    The patient has dysphagia to pills and is in need of a screening colonoscopy.  He has been scheduled for an EGD and colonoscopy.  The patient understands the  indications, alternatives, risks, and benefits of the procedure and wishes to proceed.

## 2019-03-07 PROBLEM — K21.9 GASTROESOPHAGEAL REFLUX DISEASE: Status: ACTIVE | Noted: 2019-03-07

## 2019-03-07 PROBLEM — Z12.11 ENCOUNTER FOR SCREENING COLONOSCOPY: Status: ACTIVE | Noted: 2019-03-07

## 2019-03-22 ENCOUNTER — HOSPITAL ENCOUNTER (OUTPATIENT)
Facility: HOSPITAL | Age: 64
Setting detail: HOSPITAL OUTPATIENT SURGERY
Discharge: HOME | End: 2019-03-22
Attending: SURGERY | Admitting: SURGERY

## 2019-03-22 ENCOUNTER — ANESTHESIA EVENT (OUTPATIENT)
Dept: GASTROENTEROLOGY | Facility: HOSPITAL | Age: 64
End: 2019-03-22

## 2019-03-22 ENCOUNTER — ANESTHESIA (OUTPATIENT)
Dept: GASTROENTEROLOGY | Facility: HOSPITAL | Age: 64
End: 2019-03-22

## 2019-03-22 VITALS
TEMPERATURE: 98 F | RESPIRATION RATE: 12 BRPM | OXYGEN SATURATION: 99 % | HEART RATE: 60 BPM | WEIGHT: 198.4 LBS | SYSTOLIC BLOOD PRESSURE: 118 MMHG | DIASTOLIC BLOOD PRESSURE: 83 MMHG | BODY MASS INDEX: 26.87 KG/M2 | HEIGHT: 72 IN

## 2019-03-22 DIAGNOSIS — Z12.11 ENCOUNTER FOR SCREENING COLONOSCOPY: ICD-10-CM

## 2019-03-22 DIAGNOSIS — R13.10 DYSPHAGIA, UNSPECIFIED TYPE: Primary | ICD-10-CM

## 2019-03-22 DIAGNOSIS — K21.9 GASTROESOPHAGEAL REFLUX DISEASE, ESOPHAGITIS PRESENCE NOT SPECIFIED: ICD-10-CM

## 2019-03-22 PROCEDURE — S0260 H&P FOR SURGERY: HCPCS | Performed by: SURGERY

## 2019-03-22 PROCEDURE — 43235 EGD DIAGNOSTIC BRUSH WASH: CPT | Performed by: SURGERY

## 2019-03-22 PROCEDURE — 25010000002 PROPOFOL 10 MG/ML EMULSION: Performed by: ANESTHESIOLOGY

## 2019-03-22 PROCEDURE — 88305 TISSUE EXAM BY PATHOLOGIST: CPT | Performed by: SURGERY

## 2019-03-22 PROCEDURE — 45380 COLONOSCOPY AND BIOPSY: CPT | Performed by: SURGERY

## 2019-03-22 RX ORDER — SODIUM CHLORIDE 0.9 % (FLUSH) 0.9 %
3 SYRINGE (ML) INJECTION AS NEEDED
Status: DISCONTINUED | OUTPATIENT
Start: 2019-03-22 | End: 2019-03-22 | Stop reason: HOSPADM

## 2019-03-22 RX ORDER — LIDOCAINE HYDROCHLORIDE 10 MG/ML
0.5 INJECTION, SOLUTION INFILTRATION; PERINEURAL ONCE AS NEEDED
Status: DISCONTINUED | OUTPATIENT
Start: 2019-03-22 | End: 2019-03-22 | Stop reason: HOSPADM

## 2019-03-22 RX ORDER — PROPOFOL 10 MG/ML
VIAL (ML) INTRAVENOUS CONTINUOUS PRN
Status: DISCONTINUED | OUTPATIENT
Start: 2019-03-22 | End: 2019-03-22 | Stop reason: SURG

## 2019-03-22 RX ORDER — LIDOCAINE HYDROCHLORIDE 20 MG/ML
INJECTION, SOLUTION INFILTRATION; PERINEURAL AS NEEDED
Status: DISCONTINUED | OUTPATIENT
Start: 2019-03-22 | End: 2019-03-22 | Stop reason: SURG

## 2019-03-22 RX ORDER — SODIUM CHLORIDE, SODIUM LACTATE, POTASSIUM CHLORIDE, CALCIUM CHLORIDE 600; 310; 30; 20 MG/100ML; MG/100ML; MG/100ML; MG/100ML
1000 INJECTION, SOLUTION INTRAVENOUS CONTINUOUS
Status: DISCONTINUED | OUTPATIENT
Start: 2019-03-22 | End: 2019-03-22 | Stop reason: HOSPADM

## 2019-03-22 RX ADMIN — SODIUM CHLORIDE, POTASSIUM CHLORIDE, SODIUM LACTATE AND CALCIUM CHLORIDE 1000 ML: 600; 310; 30; 20 INJECTION, SOLUTION INTRAVENOUS at 08:29

## 2019-03-22 RX ADMIN — LIDOCAINE HYDROCHLORIDE 60 MG: 20 INJECTION, SOLUTION INFILTRATION; PERINEURAL at 09:48

## 2019-03-22 RX ADMIN — SODIUM CHLORIDE, POTASSIUM CHLORIDE, SODIUM LACTATE AND CALCIUM CHLORIDE: 600; 310; 30; 20 INJECTION, SOLUTION INTRAVENOUS at 09:53

## 2019-03-22 RX ADMIN — PROPOFOL 200 MCG/KG/MIN: 10 INJECTION, EMULSION INTRAVENOUS at 09:47

## 2019-03-22 NOTE — H&P
Subjective      Luca Carolina Jr. is a 63 y.o. male who presents to the office in surgical consultation from Jorge Castro MD for dysphagia.     History of Present Illness      The patient has symptoms of dysphagia that have been worsening over the past several months.  It is primarily when he takes a large pill that it gets stuck.  He then has to cough the patella and chew.  He is able to eat food without any problems but he must drink fluid with it to keep the food going down smoothly.  He has never had a meat impaction.  He eats meat without difficulty except for the requirement of drinking.  He has intermittent GERD symptoms and has tried over-the-counter medications with no improvement.     The patient had a colonoscopy 10 years ago that was normal.  He has no significant GI symptoms.     Review of Systems   Constitutional: Negative for activity change, appetite change, fatigue and fever.   HENT: Positive for trouble swallowing. Negative for voice change.    Respiratory: Negative for chest tightness and shortness of breath.    Cardiovascular: Negative for chest pain and palpitations.   Gastrointestinal: Negative for abdominal pain, blood in stool, constipation, diarrhea, nausea and vomiting.   Endocrine: Negative for cold intolerance and heat intolerance.   Genitourinary: Negative for dysuria and flank pain.   Neurological: Negative for dizziness and light-headedness.   Hematological: Negative for adenopathy. Does not bruise/bleed easily.   Psychiatric/Behavioral: Negative for agitation and confusion.      Medical History   Past Medical History:   Diagnosis Date   • ADD (attention deficit disorder) 6/10/2016   • Allergic rhinitis due to pollen 6/10/2016   • HLD (hyperlipidemia) 6/10/2016         Surgical History         Past Surgical History:   Procedure Laterality Date   • COLONOSCOPY   03/18/2009     LAURIE Maya   • COLONOSCOPY N/A 2009     Dr. Zacarias               Family History   Problem  Relation Age of Onset   • No Known Problems Mother     • Aortic stenosis Father           complications   • ADD / ADHD Daughter     • Anxiety disorder Son     • No Known Problems Sister     • No Known Problems Brother     • No Known Problems Sister     • Drug abuse Neg Hx        Social History            Socioeconomic History   • Marital status:        Spouse name: Not on file   • Number of children: Not on file   • Years of education: Not on file   • Highest education level: Not on file   Social Needs   • Financial resource strain: Not on file   • Food insecurity - worry: Not on file   • Food insecurity - inability: Not on file   • Transportation needs - medical: Not on file   • Transportation needs - non-medical: Not on file   Occupational History   • Occupation: Sales   Tobacco Use   • Smoking status: Never Smoker   • Smokeless tobacco: Never Used   Substance and Sexual Activity   • Alcohol use: Yes       Alcohol/week: 3.0 oz       Types: 5 Cans of beer per week   • Drug use: No   • Sexual activity: Defer   Other Topics Concern   • Not on file   Social History Narrative   • Not on file            Objective      Physical Exam   Constitutional: He is oriented to person, place, and time. He appears well-developed and well-nourished.  Non-toxic appearance.   Eyes: EOM are normal. No scleral icterus.   Pulmonary/Chest: Effort normal. No respiratory distress.   Abdominal: Soft. Normal appearance. There is no tenderness.   Neurological: He is alert and oriented to person, place, and time.   Skin: Skin is warm and dry.   Psychiatric: He has a normal mood and affect. His behavior is normal. Judgment and thought content normal.            Assessment/Plan            The primary encounter diagnosis was Gastroesophageal reflux disease, esophagitis presence not specified. A diagnosis of Encounter for screening colonoscopy was also pertinent to this visit.     The patient has dysphagia to pills and is in need of a  screening colonoscopy.  He has been scheduled for an EGD and colonoscopy.  The patient understands the indications, alternatives, risks, and benefits of the procedure and wishes to proceed.

## 2019-03-22 NOTE — ANESTHESIA PREPROCEDURE EVALUATION
Anesthesia Evaluation     no history of anesthetic complications:  NPO Solid Status: > 8 hours             Airway   Mallampati: II  TM distance: >3 FB  Neck ROM: full  No difficulty expected  Dental - normal exam     Pulmonary - normal exam   (-) not a smoker  Cardiovascular     Rhythm: regular    (+) hyperlipidemia,       Neuro/Psych- negative ROS  GI/Hepatic/Renal/Endo    (+)  GERD,      Musculoskeletal     Abdominal    Substance History      OB/GYN          Other                        Anesthesia Plan    ASA 2     MAC     intravenous induction

## 2019-03-22 NOTE — OP NOTE
Surgeon:     Georges Zacarias Jr., M.D.    Preoperative Diagnosis:     1.  Dysphagia  2.  Need for screening colonoscopy    Postoperative Diagnosis:     1.  Possible Zenker's diverticulum  2.  Mild diverticulosis  3.  Small sigmoid colon polyp    Procedure Performed:     1.  Attempted EGD  2.  Colonoscopy with biopsy of sigmoid colon polyp    Indications:     The patient is a pleasant 63-year-old male who has had dysphagia primarily related pills and is in need of a screening colonoscopy.  He presents for EGD and colonoscopy.  The patient understands the indications, alternatives, risks, and benefits of the procedure and wishes to proceed.    Procedure:     The patient was identified, taken to the endoscopy suite, and place in the left side down decubitus procedure.  The patient underwent a MAC anesthesia and was appropriately monitored through the case by the anesthesia personnel.  A biteblock was placed and the gastroscope was introduced into the oropharynx and advanced to the posterior pharynx where it could not be successfully passed into the esophagus.  It was withdrawn.  The patient tolerated the procedure well.  Attention was then turned to the colonoscopy.  A rectal exam was performed that was normal.  The colonoscope was introduced into the rectum and advanced very carefully to the cecum that was identified by the cecal strap, ileocecal valve, and the appendiceal orifice.  The scope was then slowly withdrawn with careful circumferential examination of the mucosa performed.  The bowel prep was good allowing adequate visualization of mucosal surfaces.  The scope was withdrawn.  The patient tolerated the procedure well and was transferred the recovery area in stable condition.    Findings:    The gastroscope could not be successfully advanced through the upper esophageal sphincter suggesting a possible Zenker's diverticulum.  It was elected not to try to force the scope but to check an upper GI first.    There  was mild diverticulosis involving the sigmoid colon.    There was a small sigmoid colon polyp right at the rectosigmoid that was removed by cold biopsy forceps and retrieved.    Recommendations:     1.  Order upper GI.  2.  High-fiber diet.  3.  Await pathology results from the polypectomy.  4.  Repeat colonoscopy in 5 years.    Georges Zacarias Jr., M.D.

## 2019-03-23 NOTE — ADDENDUM NOTE
Addendum  created 03/23/19 0001 by RenderMandeep MD    Review and Sign - Ready for Procedure, Sign clinical note

## 2019-03-25 LAB
CYTO UR: NORMAL
LAB AP CASE REPORT: NORMAL
PATH REPORT.FINAL DX SPEC: NORMAL
PATH REPORT.GROSS SPEC: NORMAL

## 2019-04-11 ENCOUNTER — HOSPITAL ENCOUNTER (OUTPATIENT)
Dept: GENERAL RADIOLOGY | Facility: HOSPITAL | Age: 64
Discharge: HOME | End: 2019-04-11
Admitting: SURGERY

## 2019-04-11 DIAGNOSIS — R13.10 DYSPHAGIA, UNSPECIFIED TYPE: ICD-10-CM

## 2019-04-11 PROCEDURE — 74247: CPT

## 2019-04-11 RX ADMIN — BARIUM SULFATE 183 ML: 960 POWDER, FOR SUSPENSION ORAL at 08:30

## 2019-04-11 RX ADMIN — BARIUM SULFATE 135 ML: 980 POWDER, FOR SUSPENSION ORAL at 08:30

## 2019-04-11 RX ADMIN — ANTACID/ANTIFLATULENT 1 TABLET: 380; 550; 10; 10 GRANULE, EFFERVESCENT ORAL at 08:30

## 2019-04-15 ENCOUNTER — TELEPHONE (OUTPATIENT)
Dept: SURGERY | Facility: CLINIC | Age: 64
End: 2019-04-15

## 2019-04-15 NOTE — TELEPHONE ENCOUNTER
The patient was called on the telephone with results of his upper GI.  There is thickening of the cricopharyngeal muscle causing some stricturing of the upper esophageal sphincter.  This was discussed with him.  He is not interested in any treatment at this time.  He will contact our office if his symptoms worsen.

## 2019-10-15 ENCOUNTER — TELEPHONE (OUTPATIENT)
Dept: INTERNAL MEDICINE | Age: 64
End: 2019-10-15

## 2019-10-15 NOTE — TELEPHONE ENCOUNTER
Pt called in asking if Dr. Dewey would accept him as a new patient. His previous provider recommends him. His wife Stacie Carolina is also a pt of .

## 2019-11-06 ENCOUNTER — OFFICE VISIT (OUTPATIENT)
Dept: INTERNAL MEDICINE | Age: 64
End: 2019-11-06

## 2019-11-06 VITALS
TEMPERATURE: 96.9 F | WEIGHT: 190 LBS | HEART RATE: 56 BPM | BODY MASS INDEX: 25.73 KG/M2 | HEIGHT: 72 IN | DIASTOLIC BLOOD PRESSURE: 70 MMHG | SYSTOLIC BLOOD PRESSURE: 114 MMHG | OXYGEN SATURATION: 98 %

## 2019-11-06 DIAGNOSIS — F98.8 ATTENTION DEFICIT DISORDER (ADD) WITHOUT HYPERACTIVITY: Primary | ICD-10-CM

## 2019-11-06 DIAGNOSIS — Z51.81 THERAPEUTIC DRUG MONITORING: ICD-10-CM

## 2019-11-06 DIAGNOSIS — Z23 ENCOUNTER FOR IMMUNIZATION: ICD-10-CM

## 2019-11-06 DIAGNOSIS — R45.4 IRRITABILITY: ICD-10-CM

## 2019-11-06 DIAGNOSIS — E78.00 PURE HYPERCHOLESTEROLEMIA: ICD-10-CM

## 2019-11-06 PROBLEM — E78.5 DYSLIPIDEMIA: Chronic | Status: ACTIVE | Noted: 2017-01-27

## 2019-11-06 PROCEDURE — 90471 IMMUNIZATION ADMIN: CPT | Performed by: INTERNAL MEDICINE

## 2019-11-06 PROCEDURE — 99215 OFFICE O/P EST HI 40 MIN: CPT | Performed by: INTERNAL MEDICINE

## 2019-11-06 PROCEDURE — 90674 CCIIV4 VAC NO PRSV 0.5 ML IM: CPT | Performed by: INTERNAL MEDICINE

## 2019-11-06 RX ORDER — DEXTROAMPHETAMINE SACCHARATE, AMPHETAMINE ASPARTATE MONOHYDRATE, DEXTROAMPHETAMINE SULFATE AND AMPHETAMINE SULFATE 1.25; 1.25; 1.25; 1.25 MG/1; MG/1; MG/1; MG/1
5 CAPSULE, EXTENDED RELEASE ORAL EVERY MORNING
Qty: 30 CAPSULE | Refills: 0 | Status: SHIPPED | OUTPATIENT
Start: 2019-11-06 | End: 2019-12-04 | Stop reason: SDUPTHER

## 2019-11-06 RX ORDER — SERTRALINE HYDROCHLORIDE 25 MG/1
25 TABLET, FILM COATED ORAL DAILY
Qty: 90 TABLET | Refills: 1 | Status: SHIPPED | OUTPATIENT
Start: 2019-11-06 | End: 2020-05-22 | Stop reason: SDUPTHER

## 2019-11-06 NOTE — ASSESSMENT & PLAN NOTE
His LDL has been high as 185 in the past.   Check lipid panel when fasting (order placed).   Would likely benefit from getting back on statin therapy.

## 2019-11-06 NOTE — ASSESSMENT & PLAN NOTE
Established history of ADD (dx'ed in 2007 with neuropsychological evaluation)  Has been off medication x 3 months but is interested in resuming medication.     Consent / agreement signed. Rojelio requested. UDS ordered.   Restart Adderall XR 5 mg qAM #30, no refill. Rx sent electronically.   Follow-up in 3 months.

## 2019-11-06 NOTE — PROGRESS NOTES
[facility] [place]  SLOANE HOOD M.D.      Luca Carolina . / 64 y.o. / male  11/06/2019    CHIEF COMPLAINT     Establish Care; Adult ADD (wants to resume medication (Adderall XR)); and Hyperlipidemia      ASSESSMENT & PLAN     Problem List Items Addressed This Visit        High    Attention deficit disorder (ADD) without hyperactivity - Primary (Chronic)    Overview     Neuropsychological evaluation ~2007.     **Approved for electronic prescription for Adderall XR on 11/06/19**          Current Assessment & Plan     Established history of ADD (dx'ed in 2007 with neuropsychological evaluation)  Has been off medication x 3 months but is interested in resuming medication.     Consent / agreement signed. Rojelio requested. UDS ordered.   Restart Adderall XR 5 mg qAM #30, no refill. Rx sent electronically.   Follow-up in 3 months.          Relevant Medications    sertraline (ZOLOFT) 25 MG tablet    amphetamine-dextroamphetamine XR (ADDERALL XR) 5 MG 24 hr capsule    Other Relevant Orders    Compliance Drug Analysis, Ur - Urine, Clean Catch    TSH+Free T4       Medium    Pure hypercholesterolemia (Chronic)    Current Assessment & Plan     His LDL has been high as 185 in the past.   Check lipid panel when fasting (order placed).   Would likely benefit from getting back on statin therapy.          Relevant Medications    atorvastatin (LIPITOR) 20 MG tablet    Other Relevant Orders    Lipid Panel With / Chol / HDL Ratio    Comprehensive Metabolic Panel       Low    Irritability (Chronic)    Overview     On low dose sertraline 25 mg qd.          Current Assessment & Plan     Continue sertraline 25 mg qd.          Relevant Medications    sertraline (ZOLOFT) 25 MG tablet      Other Visit Diagnoses     Encounter for immunization        Relevant Orders    Flucelvax Quad=>4Years ([id number]) (Completed)    Therapeutic drug monitoring        Relevant Orders    Compliance Drug Analysis, Ur - Urine, Clean Catch        Orders  "Placed This Encounter   Procedures   • Flucelvax Quad=>4Years ([id number])   • Lipid Panel With / Chol / HDL Ratio   • Comprehensive Metabolic Panel   • Compliance Drug Analysis, Ur - Urine, Clean Catch   • TSH+Free T4     New Medications Ordered This Visit   Medications   • sertraline (ZOLOFT) 25 MG tablet     Sig: Take 1 tablet by mouth Daily.     Dispense:  90 tablet     Refill:  1   • amphetamine-dextroamphetamine XR (ADDERALL XR) 5 MG 24 hr capsule     Sig: Take 1 capsule by mouth Every Morning     Dispense:  30 capsule     Refill:  0       Summary/Discussion:  Spent 45 minutes in face-to-face encounter time and >50% of time spent in counseling regarding above medical problems/issues.      Next Appointment with me: 2/3/2020    Return in about 3 months (around 2/6/2020) for HYPERLIPIDEMIA, ADD/ADHD, SCHEDULE ANNUAL PHYSICAL FOR NEXT YEAR.      VITALS     Visit Vitals  /70   Pulse 56   Temp 96.9 °F (36.1 °C)   Ht 182.9 cm (72\")   Wt 86.2 kg (190 lb)   SpO2 98%   BMI 25.77 kg/m²       BP Readings from Last 3 Encounters:   11/06/19 114/70   08/22/19 109/71   03/22/19 118/83     Wt Readings from Last 3 Encounters:   11/06/19 86.2 kg (190 lb)   08/22/19 79.8 kg (176 lb)   03/22/19 90 kg (198 lb 6.4 oz)      Body mass index is 25.77 kg/m².    HISTORY OF PRESENT ILLNESS     Patient is a 64 y.o. male who is here to establish care.  Previous primary care physician was Dr. Castro.  He was diagnosed with adult onset ADD around 2007 with neuropsychological evaluation.  He was started on Adderall XR and was taking it consistently for over 5 years prior to stopping medication earlier this year.  Patient reports lack of focus and attention and decreased work productivity when he was off the medication.  He would like to restart taking the medication.  He was taking Adderall XR 5 mg once a day.  He was also started on sertraline subsequent to starting Adderall XR for \"irritability\".  Denies history of depression or " generalized anxiety.  He has history of hyperlipidemia and was taking atorvastatin but came off the medication when his wife placed him on a new dietary program.  LDL cholesterol was as high 185 in the past.      Patient Care Team:  Christiano Dewey MD as PCP - General (Internal Medicine)      REVIEW OF SYSTEMS     Review of Systems   Constitutional: Negative.    HENT: Negative.    Eyes: Negative.    Respiratory: Negative.    Cardiovascular: Negative.    Gastrointestinal: Negative.    Endocrine: Negative.    Genitourinary: Negative.    Musculoskeletal: Negative.    Skin: Negative.    Allergic/Immunologic: Positive for environmental allergies.   Neurological: Negative.    Hematological: Negative.    Psychiatric/Behavioral: Positive for agitation and decreased concentration. Negative for dysphoric mood. The patient is not nervous/anxious and is not hyperactive.          PHYSICAL EXAMINATION     Physical Exam   Constitutional: He is oriented to person, place, and time. He appears well-nourished. No distress.   HENT:   Head: Normocephalic.   Right Ear: Tympanic membrane normal.   Left Ear: Tympanic membrane normal.   Mouth/Throat: Oropharynx is clear and moist. No oral lesions.   Eyes: Conjunctivae are normal. No scleral icterus.   Neck: Neck supple. No tracheal deviation present. No thyromegaly present.   Cardiovascular: Normal rate, regular rhythm, normal heart sounds and intact distal pulses. Exam reveals no gallop and no friction rub.   No murmur heard.  Pulmonary/Chest: Effort normal and breath sounds normal.   Abdominal: Soft. Bowel sounds are normal. He exhibits no distension and no mass. There is no tenderness. No hernia.   Musculoskeletal: He exhibits no edema or deformity.   Lymphadenopathy:     He has no cervical adenopathy.        Right: No supraclavicular adenopathy present.        Left: No supraclavicular adenopathy present.   Neurological: He is alert and oriented to person, place, and time. He has normal  reflexes.   Skin: Skin is intact. No rash noted. No erythema. No pallor.   No jaundice   Psychiatric: He has a normal mood and affect. His speech is normal and behavior is normal. Judgment and thought content normal. His mood appears not anxious. Cognition and memory are normal. He does not exhibit a depressed mood. He is attentive.         DATA REVIEWED     Labs:   Lab Results   Component Value Date     01/02/2019    K 4.8 01/02/2019    CALCIUM 9.5 01/02/2019    AST 31 01/02/2019    ALT 38 01/02/2019    BUN 14 01/02/2019    CREATININE 0.82 01/02/2019    CREATININE 0.81 12/01/2017    EGFRIFNONA 95 01/02/2019    EGFRIFAFRI 115 01/02/2019       Lab Results   Component Value Date     (H) 01/02/2019    GLU 93 12/01/2017       Lab Results   Component Value Date     (H) 01/02/2019     (H) 12/01/2017    HDL 62 (H) 01/02/2019    TRIG 143 01/02/2019       No results found for: TSH, FREET4       Lab Results   Component Value Date    WBC 5.37 01/02/2019    HGB 14.1 01/02/2019     01/02/2019         Imaging:        Medical Tests:        Summary of old records / correspondence / consultant report:        Request outside records:        ______________________________________________________________________    ALLERGIES  No Known Allergies     MEDICATIONS  Current Outpatient Medications   Medication Sig Dispense Refill   • ibuprofen (ADVIL,MOTRIN) 200 MG tablet Take 200 mg by mouth every 6 (six) hours as needed for mild pain (1-3).     • Loratadine 10 MG capsule Take 1 tablet by mouth Daily.     • sertraline (ZOLOFT) 25 MG tablet Take 1 tablet by mouth Daily. 90 tablet 1   • Triamcinolone Acetonide (NASACORT) 55 MCG/ACT nasal inhaler 2 sprays into each nostril daily.     • amphetamine-dextroamphetamine XR (ADDERALL XR) 5 MG 24 hr capsule  *HAS NOT BEEN TAKING THIS FOR OVER 3 MONTHS  Take 1 capsule by mouth Every Morning 30 capsule 0   • atorvastatin (LIPITOR) 20 MG tablet  *HAS NOT BEEN TAKING   TAKE 1 TABLET BY MOUTH DAILY 90 tablet 3     No current facility-administered medications for this visit.        PFSH:     The following portions of the patient's history were reviewed and updated as appropriate: Allergies / Current Medications / Past Medical History / Surgical History / Social History / Family History    PROBLEM LIST   Patient Active Problem List   Diagnosis   • Attention deficit disorder (ADD) without hyperactivity   • Allergic rhinitis due to pollen   • Pure hypercholesterolemia   • Irritability       PAST MEDICAL HISTORY  Past Medical History:   Diagnosis Date   • ADD (attention deficit disorder) 6/10/2016   • Allergic rhinitis due to pollen 6/10/2016   • HLD (hyperlipidemia) 6/10/2016       SURGICAL HISTORY  Past Surgical History:   Procedure Laterality Date   • COLONOSCOPY  03/18/2009    LAURIE Maya   • COLONOSCOPY N/A 2009    Dr. Zacarias   • COLONOSCOPY N/A 3/22/2019    Procedure: COLONOSCOPY to cecum/TI;  Surgeon: Georges Zacarias Jr., MD;  Location: [facility] ENDOSCOPY;  Service: General   • ENDOSCOPY N/A 3/22/2019    Procedure: ESOPHAGOGASTRODUODENOSCOPY;  Surgeon: Georges Zacarias Jr., MD;  Location: [facility] ENDOSCOPY;  Service: General   • HEMORRHOIDECTOMY     • SINUS SURGERY         SOCIAL HISTORY  Social History     Socioeconomic History   • Marital status:      Spouse name: Stacie*   • Number of children: 2   • Years of education: Not on file   • Highest education level: Not on file   Occupational History   • Occupation: Sales   Tobacco Use   • Smoking status: Never Smoker   • Smokeless tobacco: Never Used   Substance and Sexual Activity   • Alcohol use: Yes     Frequency: 2-3 times a week     Drinks per session: 1 or 2   • Drug use: No   • Sexual activity: Yes   Lifestyle   • Physical activity:     Days per week: 7 days     Minutes per session: Not on file   • Stress: Rather much       FAMILY HISTORY  Family History   Problem Relation Age of Onset   • No Known Problems Mother     • Aortic stenosis Father         complications   • ADD / ADHD Daughter    • Anxiety disorder Son    • No Known Problems Sister    • No Known Problems Brother    • No Known Problems Sister    • Drug abuse Neg Hx    • Depression Neg Hx    • Alcohol abuse Neg Hx          **Dragon Disclaimer:   Much of this encounter note is an electronic transcription/translation of spoken language to printed text. The electronic translation of spoken language may permit erroneous, or at times, nonsensical words or phrases to be inadvertently transcribed. Although I have reviewed the note for such errors, some may still exist.       Template created by Tor Dewey MD

## 2019-11-09 LAB
ALBUMIN SERPL-MCNC: 4.5 G/DL (ref 3.6–4.8)
ALBUMIN/GLOB SERPL: 2.5 {RATIO} (ref 1.2–2.2)
ALP SERPL-CCNC: 53 IU/L (ref 39–117)
ALT SERPL-CCNC: 19 IU/L (ref 0–44)
AST SERPL-CCNC: 24 IU/L (ref 0–40)
BILIRUB SERPL-MCNC: 0.6 MG/DL (ref 0–1.2)
BUN SERPL-MCNC: 14 MG/DL (ref 8–27)
BUN/CREAT SERPL: 15 (ref 10–24)
CALCIUM SERPL-MCNC: 9.3 MG/DL (ref 8.6–10.2)
CHLORIDE SERPL-SCNC: 102 MMOL/L (ref 96–106)
CHOLEST SERPL-MCNC: 242 MG/DL (ref 100–199)
CHOLEST/HDLC SERPL: 4.2 RATIO (ref 0–5)
CO2 SERPL-SCNC: 26 MMOL/L (ref 20–29)
CREAT SERPL-MCNC: 0.96 MG/DL (ref 0.76–1.27)
GLOBULIN SER CALC-MCNC: 1.8 G/DL (ref 1.5–4.5)
GLUCOSE SERPL-MCNC: 92 MG/DL (ref 65–99)
HDLC SERPL-MCNC: 58 MG/DL
LDLC SERPL CALC-MCNC: 161 MG/DL (ref 0–99)
POTASSIUM SERPL-SCNC: 4.6 MMOL/L (ref 3.5–5.2)
PROT SERPL-MCNC: 6.3 G/DL (ref 6–8.5)
SODIUM SERPL-SCNC: 142 MMOL/L (ref 134–144)
T4 FREE SERPL-MCNC: 1.04 NG/DL (ref 0.82–1.77)
TRIGL SERPL-MCNC: 113 MG/DL (ref 0–149)
TSH SERPL DL<=0.005 MIU/L-ACNC: 3.2 UIU/ML (ref 0.45–4.5)
VLDLC SERPL CALC-MCNC: 23 MG/DL (ref 5–40)

## 2019-11-11 NOTE — PROGRESS NOTES
Send results:    Cholesterol is notably higher. I would recommend restarting atorvastatin 20 mg daily. Let us know if you a prescription sent to your pharmacy. Will recheck fasting labs on your followup.

## 2019-11-14 LAB — DRUGS UR: NORMAL

## 2019-12-04 ENCOUNTER — TELEPHONE (OUTPATIENT)
Dept: INTERNAL MEDICINE | Age: 64
End: 2019-12-04

## 2019-12-04 DIAGNOSIS — F98.8 ATTENTION DEFICIT DISORDER (ADD) WITHOUT HYPERACTIVITY: ICD-10-CM

## 2019-12-04 RX ORDER — DEXTROAMPHETAMINE SACCHARATE, AMPHETAMINE ASPARTATE MONOHYDRATE, DEXTROAMPHETAMINE SULFATE AND AMPHETAMINE SULFATE 1.25; 1.25; 1.25; 1.25 MG/1; MG/1; MG/1; MG/1
5 CAPSULE, EXTENDED RELEASE ORAL EVERY MORNING
Qty: 30 CAPSULE | Refills: 0 | Status: SHIPPED | OUTPATIENT
Start: 2019-12-04 | End: 2019-12-30 | Stop reason: SDUPTHER

## 2019-12-05 ENCOUNTER — TELEPHONE (OUTPATIENT)
Dept: INTERNAL MEDICINE | Age: 64
End: 2019-12-05

## 2019-12-05 RX ORDER — ATORVASTATIN CALCIUM 20 MG/1
20 TABLET, FILM COATED ORAL DAILY
Qty: 90 TABLET | Refills: 3 | Status: SHIPPED | OUTPATIENT
Start: 2019-12-05 | End: 2020-12-08

## 2019-12-30 ENCOUNTER — TELEPHONE (OUTPATIENT)
Dept: INTERNAL MEDICINE | Age: 64
End: 2019-12-30

## 2019-12-30 DIAGNOSIS — F98.8 ATTENTION DEFICIT DISORDER (ADD) WITHOUT HYPERACTIVITY: ICD-10-CM

## 2019-12-30 RX ORDER — DEXTROAMPHETAMINE SACCHARATE, AMPHETAMINE ASPARTATE MONOHYDRATE, DEXTROAMPHETAMINE SULFATE AND AMPHETAMINE SULFATE 1.25; 1.25; 1.25; 1.25 MG/1; MG/1; MG/1; MG/1
5 CAPSULE, EXTENDED RELEASE ORAL EVERY MORNING
Qty: 30 CAPSULE | Refills: 0 | Status: SHIPPED | OUTPATIENT
Start: 2019-12-30 | End: 2020-02-11 | Stop reason: SDUPTHER

## 2019-12-30 NOTE — TELEPHONE ENCOUNTER
Patient is going out of town leaving Sunday January 5th. He has 7 or 8 pills left of his Adderal and wanted to see if he could get it refilled before he leaves town? He will run out of Aderral while he is out of town.

## 2019-12-30 NOTE — TELEPHONE ENCOUNTER
Last ov-11-6-19  Next ov-2-3-20  Last UDS-118-19  Last qyhpqf-41-0-19  Patient is going out of town leaving Sunday January 5th. He has 7 or 8 pills left of his Adderal and wanted to see if he could get it refilled before he leaves town? He will run out of AdedermSearchal while he is out of town.

## 2020-02-03 ENCOUNTER — OFFICE VISIT (OUTPATIENT)
Dept: INTERNAL MEDICINE | Age: 65
End: 2020-02-03

## 2020-02-03 VITALS
DIASTOLIC BLOOD PRESSURE: 64 MMHG | HEART RATE: 70 BPM | HEIGHT: 72 IN | TEMPERATURE: 97.3 F | BODY MASS INDEX: 26.55 KG/M2 | OXYGEN SATURATION: 99 % | WEIGHT: 196 LBS | SYSTOLIC BLOOD PRESSURE: 120 MMHG

## 2020-02-03 DIAGNOSIS — R45.4 IRRITABILITY: Chronic | ICD-10-CM

## 2020-02-03 DIAGNOSIS — F98.8 ATTENTION DEFICIT DISORDER (ADD) WITHOUT HYPERACTIVITY: Primary | Chronic | ICD-10-CM

## 2020-02-03 DIAGNOSIS — E78.00 PURE HYPERCHOLESTEROLEMIA: Chronic | ICD-10-CM

## 2020-02-03 PROCEDURE — 99214 OFFICE O/P EST MOD 30 MIN: CPT | Performed by: INTERNAL MEDICINE

## 2020-02-03 NOTE — PATIENT INSTRUCTIONS
** IMPORTANT MESSAGE FROM DR. HOOD **    In our office, your satisfaction is VERY important to us.     You may receive a survey from [employer] by mail or E-mail for you to provide feedback about your visit. This information is invaluable for me to know what we can do to improve our services.     I ask that you please take a few minutes to complete the survey and let us know how we are doing in serving your needs. (You may receive the survey more than once for multiple visits)    Thank You !    Dr. Hood & Staff    _________________________________________________________________________________________________________________________      ** ADDITIONAL INSTRUCTION / REMINDERS FROM DR. HOOD **

## 2020-02-03 NOTE — ASSESSMENT & PLAN NOTE
Reviewed latest MALLORIE . Reviewed most recent UDS or ordered UDS. Patient is having medication refilled at expected intervals. Using medication appropriately without evidence of unusual increased use, abuse, or diverting.     He has been taking Adderall XR consistently. Continue same.

## 2020-02-03 NOTE — PROGRESS NOTES
[facility] [place] [facility]  SLOANE HOOD M.D.      Luca Carolina . / 64 y.o. / male  02/03/2020      CHIEF COMPLAINT     ADHD (due for uds) and Hyperlipidemia      ASSESSMENT & PLAN     Problem List Items Addressed This Visit        High    Attention deficit disorder (ADD) without hyperactivity - Primary (Chronic)    Overview     Neuropsychological evaluation ~2007.     **Approved for electronic prescription for Adderall XR on 11/06/19**          Current Assessment & Plan     Reviewed latest MALLORIE . Reviewed most recent UDS or ordered UDS. Patient is having medication refilled at expected intervals. Using medication appropriately without evidence of unusual increased use, abuse, or diverting.     He has been taking Adderall XR consistently. Continue same.          Relevant Medications    sertraline (ZOLOFT) 25 MG tablet    amphetamine-dextroamphetamine XR (ADDERALL XR) 5 MG 24 hr capsule       Medium    Pure hypercholesterolemia (Chronic)    Current Assessment & Plan     Previously resumed statin medication. Continue atorvastatin 20 mg daily. Check lab(s).     Lab Results   Component Value Date     (H) 11/08/2019     (H) 01/02/2019     (H) 12/01/2017    HDL 58 11/08/2019             Relevant Medications    atorvastatin (LIPITOR) 20 MG tablet    Other Relevant Orders    Lipid Panel With / Chol / HDL Ratio    Comprehensive Metabolic Panel       Low    Irritability (Chronic)    Overview     On low dose sertraline 25 mg qd.          Current Assessment & Plan     Continue sertraline 25 mg daily.          Relevant Medications    sertraline (ZOLOFT) 25 MG tablet        Orders Placed This Encounter   Procedures   • Lipid Panel With / Chol / HDL Ratio   • Comprehensive Metabolic Panel     No orders of the defined types were placed in this encounter.      Summary/Discussion:  · Complete antibiotic for upper respiratory tract infection started by [facility].      Next Appointment with me: 3/23/2020    Return in  "about 3 months (around 5/3/2020) for ADD/ADHD.      MEDICATIONS     Current Outpatient Medications   Medication Sig Dispense Refill   • amphetamine-dextroamphetamine XR (ADDERALL XR) 5 MG 24 hr capsule Take 1 capsule by mouth Every Morning 30 capsule 0   • atorvastatin (LIPITOR) 20 MG tablet Take 1 tablet by mouth Daily. 90 tablet 3   • azithromycin (ZITHROMAX Z-NAHUM) 250 MG tablet Take 2 tablets the first day, then 1 tablet daily for 4 days. 6 tablet 0   • benzonatate (TESSALON) 200 MG capsule Take 1 capsule by mouth 3 (Three) Times a Day As Needed for Cough. 30 capsule 0   • ibuprofen (ADVIL,MOTRIN) 200 MG tablet Take 200 mg by mouth every 6 (six) hours as needed for mild pain (1-3).     • Loratadine 10 MG capsule Take 1 tablet by mouth Daily.     • sertraline (ZOLOFT) 25 MG tablet Take 1 tablet by mouth Daily. 90 tablet 1   • Triamcinolone Acetonide (NASACORT) 55 MCG/ACT nasal inhaler 2 sprays into each nostril daily.       No current facility-administered medications for this visit.           VITAL SIGNS     Visit Vitals  /64   Pulse 70   Temp 97.3 °F (36.3 °C)   Ht 182.9 cm (72\")   Wt 88.9 kg (196 lb)   SpO2 99%   BMI 26.58 kg/m²       BP Readings from Last 3 Encounters:   02/03/20 120/64   01/31/20 124/83   11/06/19 114/70     Wt Readings from Last 3 Encounters:   02/03/20 88.9 kg (196 lb)   01/31/20 86.2 kg (190 lb)   11/06/19 86.2 kg (190 lb)      Body mass index is 26.58 kg/m².      HISTORY OF PRESENT ILLNESS     ADD: taking Adderall XR 20 mg daily now. Feels medication helps with attention and focus. Denies problems with medication.     Irritability: stable on sertraline 25 mg qd.     Taking atorvastatin 20 mg daily now for hyperlipidemia without problems.     [place] [facility] [place] placed on Z-Nahum. Travelling to [place] this week.       Patient Care Team:  Christiano Dewey MD as PCP - General (Internal Medicine)      REVIEW OF SYSTEMS     Review of Systems  Constitutional neg for " high fever   Resp cough without shortness of breath   CV neg  Neuro neg  Psych stable mood / attention / focus     PHYSICAL EXAMINATION     Physical Exam  Constitutional  No distress  Lungs with coarse blood sugar bilaterally ; no wheezing or rales   Cardiovascular Rate  normal . Rhythm: regular . Heart sounds:  Normal  Psychiatric  Alert. Judgment intact. Thought content normal. Mood normal. Normal attention/focus.     REVIEWED DATA     Labs:     Lab Results   Component Value Date     11/08/2019    K 4.6 11/08/2019    CALCIUM 9.3 11/08/2019    AST 24 11/08/2019    ALT 19 11/08/2019    BUN 14 11/08/2019    CREATININE 0.96 11/08/2019    CREATININE 0.82 01/02/2019    CREATININE 0.81 12/01/2017    EGFRIFNONA 83 11/08/2019    EGFRIFAFRI 96 11/08/2019       Lab Results   Component Value Date    GLU 92 11/08/2019     (H) 01/02/2019    GLU 93 12/01/2017       Lab Results   Component Value Date     (H) 11/08/2019     (H) 01/02/2019     (H) 12/01/2017    HDL 58 11/08/2019    HDL 62 (H) 01/02/2019    HDL 66 12/01/2017    TRIG 113 11/08/2019    TRIG 143 01/02/2019    TRIG 91 12/01/2017    CHOLHDLRATIO 4.2 11/08/2019       Lab Results   Component Value Date    TSH 3.200 11/08/2019    FREET4 1.04 11/08/2019       Lab Results   Component Value Date    WBC 5.37 01/02/2019    HGB 14.1 01/02/2019     01/02/2019       Lab Results   Component Value Date    PROTEIN Negative 01/02/2019    GLUCOSEU Negative 01/02/2019    BLOODU Negative 01/02/2019    NITRITEU Negative 01/02/2019    LEUKOCYTESUR Negative 01/02/2019       Imaging:         Medical Tests:         Summary of old records / correspondence / consultant report:         Request outside records:         ALLERGIES  No Known Allergies     PFSH:     The following portions of the patient's history were reviewed and updated as appropriate: Allergies / Current Medications / Past Medical History / Surgical History / Social History / Family  History    PROBLEM LIST   Patient Active Problem List   Diagnosis   • Attention deficit disorder (ADD) without hyperactivity   • Allergic rhinitis due to pollen   • Pure hypercholesterolemia   • Irritability       PAST MEDICAL HISTORY  Past Medical History:   Diagnosis Date   • ADD (attention deficit disorder) 6/10/2016   • Allergic rhinitis due to pollen 6/10/2016   • HLD (hyperlipidemia) 6/10/2016       SURGICAL HISTORY  Past Surgical History:   Procedure Laterality Date   • COLONOSCOPY  03/18/2009    LAURIE Maya   • COLONOSCOPY N/A 2009    Dr. Zacarias   • COLONOSCOPY N/A 3/22/2019    Procedure: COLONOSCOPY to cecum/TI;  Surgeon: Georges Zacarias Jr., MD;  Location: [facility] ENDOSCOPY;  Service: General   • ENDOSCOPY N/A 3/22/2019    Procedure: ESOPHAGOGASTRODUODENOSCOPY;  Surgeon: Georges Zacarias Jr., MD;  Location: [facility] ENDOSCOPY;  Service: General   • HEMORRHOIDECTOMY     • SINUS SURGERY         SOCIAL HISTORY  Social History     Socioeconomic History   • Marital status:      Spouse name: Stacie*   • Number of children: 2   • Years of education: Not on file   • Highest education level: Not on file   Occupational History   • Occupation: Sales   Tobacco Use   • Smoking status: Never Smoker   • Smokeless tobacco: Never Used   Substance and Sexual Activity   • Alcohol use: Yes     Frequency: 2-3 times a week     Drinks per session: 1 or 2   • Drug use: No   • Sexual activity: Yes   Lifestyle   • Physical activity:     Days per week: 7 days     Minutes per session: Not on file   • Stress: Rather much       FAMILY HISTORY  Family History   Problem Relation Age of Onset   • No Known Problems Mother    • Aortic stenosis Father         complications   • ADD / ADHD Daughter    • Anxiety disorder Son    • No Known Problems Sister    • No Known Problems Brother    • No Known Problems Sister    • Drug abuse Neg Hx    • Depression Neg Hx    • Alcohol abuse Neg Hx          **Dragon Disclaimer:   Much of this  encounter note is an electronic transcription/translation of spoken language to printed text. The electronic translation of spoken language may permit erroneous, or at times, nonsensical words or phrases to be inadvertently transcribed. Although I have reviewed the note for such errors, some may still exist.       Template created by Tor Dewey MD

## 2020-02-03 NOTE — ASSESSMENT & PLAN NOTE
Previously resumed statin medication. Continue atorvastatin 20 mg daily. Check lab(s).     Lab Results   Component Value Date     (H) 11/08/2019     (H) 01/02/2019     (H) 12/01/2017    HDL 58 11/08/2019

## 2020-02-11 DIAGNOSIS — F98.8 ATTENTION DEFICIT DISORDER (ADD) WITHOUT HYPERACTIVITY: ICD-10-CM

## 2020-02-11 RX ORDER — DEXTROAMPHETAMINE SACCHARATE, AMPHETAMINE ASPARTATE MONOHYDRATE, DEXTROAMPHETAMINE SULFATE AND AMPHETAMINE SULFATE 1.25; 1.25; 1.25; 1.25 MG/1; MG/1; MG/1; MG/1
5 CAPSULE, EXTENDED RELEASE ORAL EVERY MORNING
Qty: 30 CAPSULE | Refills: 0 | Status: SHIPPED | OUTPATIENT
Start: 2020-02-11 | End: 2020-03-11 | Stop reason: SDUPTHER

## 2020-03-11 DIAGNOSIS — F98.8 ATTENTION DEFICIT DISORDER (ADD) WITHOUT HYPERACTIVITY: ICD-10-CM

## 2020-03-11 RX ORDER — DEXTROAMPHETAMINE SACCHARATE, AMPHETAMINE ASPARTATE MONOHYDRATE, DEXTROAMPHETAMINE SULFATE AND AMPHETAMINE SULFATE 1.25; 1.25; 1.25; 1.25 MG/1; MG/1; MG/1; MG/1
5 CAPSULE, EXTENDED RELEASE ORAL EVERY MORNING
Qty: 30 CAPSULE | Refills: 0 | Status: SHIPPED | OUTPATIENT
Start: 2020-03-11 | End: 2020-04-15 | Stop reason: SDUPTHER

## 2020-03-11 NOTE — TELEPHONE ENCOUNTER
Last ov-2-3-20  Next ov-3-23-20  Last refill-2-11-20  [facility]-11-8-19  nhiqfe-5-7-20

## 2020-03-11 NOTE — TELEPHONE ENCOUNTER
PT CALLED TO GET A REFILL OF amphetamine-dextroamphetamine XR (ADDERALL XR) 5 MG 24 hr capsule. PT SAYS HE HAS 5 DAYS LEFT ON HIS CURRENT RX.    [facility]

## 2020-03-16 LAB
ALBUMIN SERPL-MCNC: 4.9 G/DL (ref 3.5–5.2)
ALBUMIN/GLOB SERPL: 3.3 G/DL
ALP SERPL-CCNC: 56 U/L (ref 39–117)
ALT SERPL-CCNC: 30 U/L (ref 1–41)
AST SERPL-CCNC: 32 U/L (ref 1–40)
BILIRUB SERPL-MCNC: 0.8 MG/DL (ref 0.2–1.2)
BUN SERPL-MCNC: 17 MG/DL (ref 8–23)
BUN/CREAT SERPL: 18.3 (ref 7–25)
CALCIUM SERPL-MCNC: 9.5 MG/DL (ref 8.6–10.5)
CHLORIDE SERPL-SCNC: 100 MMOL/L (ref 98–107)
CHOLEST SERPL-MCNC: 164 MG/DL (ref 0–200)
CHOLEST/HDLC SERPL: 2.93 {RATIO}
CO2 SERPL-SCNC: 27.4 MMOL/L (ref 22–29)
CREAT SERPL-MCNC: 0.93 MG/DL (ref 0.76–1.27)
GLOBULIN SER CALC-MCNC: 1.5 GM/DL
GLUCOSE SERPL-MCNC: 107 MG/DL (ref 65–99)
HDLC SERPL-MCNC: 56 MG/DL (ref 40–60)
LDLC SERPL CALC-MCNC: 93 MG/DL (ref 0–100)
POTASSIUM SERPL-SCNC: 5.3 MMOL/L (ref 3.5–5.2)
PROT SERPL-MCNC: 6.4 G/DL (ref 6–8.5)
SODIUM SERPL-SCNC: 139 MMOL/L (ref 136–145)
TRIGL SERPL-MCNC: 73 MG/DL (ref 0–150)
VLDLC SERPL CALC-MCNC: 14.6 MG/DL

## 2020-03-23 ENCOUNTER — OFFICE VISIT (OUTPATIENT)
Dept: INTERNAL MEDICINE | Age: 65
End: 2020-03-23

## 2020-03-23 VITALS
BODY MASS INDEX: 27.09 KG/M2 | TEMPERATURE: 96.9 F | HEIGHT: 72 IN | OXYGEN SATURATION: 97 % | SYSTOLIC BLOOD PRESSURE: 110 MMHG | HEART RATE: 90 BPM | DIASTOLIC BLOOD PRESSURE: 58 MMHG | WEIGHT: 200 LBS

## 2020-03-23 DIAGNOSIS — Z00.00 ENCOUNTER FOR ANNUAL HEALTH EXAMINATION: Primary | ICD-10-CM

## 2020-03-23 PROCEDURE — 99396 PREV VISIT EST AGE 40-64: CPT | Performed by: INTERNAL MEDICINE

## 2020-03-23 NOTE — PROGRESS NOTES
[facility] – [place], [place] INTERNAL MEDICINE  SLOANE HOOD M.D.      Luca Carolina Jr. / 64 y.o. / male  03/23/2020    CHIEF COMPLAINT     Annual Exam (due for UDS)      HISTORY OF PRESENT ILLNESS      Luca presents for annual health maintenance visit.  Previously started taking atorvastatin without problems.   On Adderall XR 5 mg daily for ADD/ADHD.     · Last health maintenance visit: approximately 1 year ago  · General health: fair  · Lifestyle:  · Attempting to lose weight?: Yes   · Diet: eats decently  · Exercise: exercises nearly every day  · Tobacco: Never used   · Alcohol: occasional/rare  · Work: Full-time  · Reproductive health:  · Sexually active?: Yes   · Concern for STD?: No   · Sexual problems?: No problems   · Sees Urologist?: No   · Depression Screening:      PHQ-2/PHQ-9 Depression Screening 2/3/2020   Little interest or pleasure in doing things 0   Feeling down, depressed, or hopeless 0   Total Score 0         PHQ-2: 0 (Not depressed)     PHQ-9: 0 (Negative screening for depression)    Patient Care Team:  Christiano Hood MD as PCP - General (Internal Medicine)  ______________________________________________________________________    ALLERGIES  No Known Allergies     MEDICATIONS  Current Outpatient Medications   Medication Sig Dispense Refill   • amphetamine-dextroamphetamine XR (ADDERALL XR) 5 MG 24 hr capsule Take 1 capsule by mouth Every Morning 30 capsule 0   • atorvastatin (LIPITOR) 20 MG tablet Take 1 tablet by mouth Daily. 90 tablet 3   • ibuprofen (ADVIL,MOTRIN) 200 MG tablet Take 200 mg by mouth every 6 (six) hours as needed for mild pain (1-3).     • Loratadine 10 MG capsule Take 1 tablet by mouth Daily.     • sertraline (ZOLOFT) 25 MG tablet Take 1 tablet by mouth Daily. 90 tablet 1   • Triamcinolone Acetonide (NASACORT) 55 MCG/ACT nasal inhaler 2 sprays into each nostril daily.       No current facility-administered medications for this visit.        PFSH:     The following portions of the patient's history  were reviewed and updated as appropriate: Allergies / Current Medications / Past Medical History / Surgical History / Social History / Family History    PROBLEM LIST   Patient Active Problem List   Diagnosis   • Attention deficit disorder (ADD) without hyperactivity   • Allergic rhinitis due to pollen   • Pure hypercholesterolemia   • Irritability       PAST MEDICAL HISTORY  Past Medical History:   Diagnosis Date   • ADD (attention deficit disorder) 6/10/2016   • Allergic rhinitis due to pollen 6/10/2016   • HLD (hyperlipidemia) 6/10/2016       SURGICAL HISTORY  Past Surgical History:   Procedure Laterality Date   • COLONOSCOPY  03/18/2009    LAURIE Maya   • COLONOSCOPY N/A 2009    Dr. Zacarias   • COLONOSCOPY N/A 3/22/2019    Procedure: COLONOSCOPY to cecum/TI;  Surgeon: Georges Zacarias Jr., MD;  [facility];  Service: General   • ENDOSCOPY N/A 3/22/2019    Procedure: ESOPHAGOGASTRODUODENOSCOPY;  Surgeon: Georges Zacarias Jr., MD;  [facility];  Service: General   • HEMORRHOIDECTOMY     • SINUS SURGERY         SOCIAL HISTORY  Social History     Socioeconomic History   • Marital status:      Spouse name: Stacie*   • Number of children: 2   • Years of education: Not on file   • Highest education level: Not on file   Occupational History   • Occupation: Sales   Tobacco Use   • Smoking status: Never Smoker   • Smokeless tobacco: Never Used   Substance and Sexual Activity   • Alcohol use: Yes     Frequency: 2-3 times a week     Drinks per session: 1 or 2   • Drug use: No   • Sexual activity: Yes   Lifestyle   • Physical activity:     Days per week: 7 days     Minutes per session: Not on file   • Stress: To some extent       FAMILY HISTORY  Family History   Problem Relation Age of Onset   • No Known Problems Mother    • Aortic stenosis Father         complications   • ADD / ADHD Daughter    • Anxiety disorder Son    • No Known Problems Sister    • No Known Problems Brother    • No  "Known Problems Sister    • Drug abuse Neg Hx    • Depression Neg Hx    • Alcohol abuse Neg Hx    • Colon cancer Neg Hx    • Prostate cancer Neg Hx        IMMUNIZATION HISTORY  Immunization History   Administered Date(s) Administered   • Flu Vaccine Quad PF >36MO 12/01/2017   • Hepatitis A 07/18/2006, 12/21/2007   • Hepatitis B 01/09/2006, 07/18/2006, 12/21/2007   • Influenza TIV (IM) 10/06/2016   • Influenza, Unspecified 11/19/2018   • Tdap 07/18/2006, 01/08/2019   • Typhoid, Unspecified 07/18/2006   • Zostavax 12/15/2015   • flucelvax quad pfs =>4 YRS 11/06/2019       ______________________________________________________________________    REVIEW OF SYSTEMS     Review of Systems   Constitutional: Positive for unexpected weight change (gain).   HENT: Negative.    Eyes: Negative.    Respiratory: Negative.    Cardiovascular: Negative.  Negative for chest pain and palpitations.   Gastrointestinal: Negative.    Endocrine: Negative.    Genitourinary: Negative.  Negative for difficulty urinating and hematuria.   Musculoskeletal: Positive for back pain.   Skin: Negative.    Allergic/Immunologic: Negative.    Neurological: Negative.    Hematological: Negative.    Psychiatric/Behavioral: Negative.         On Adderall XR for ADHD       VITALS     Visit Vitals  /58   Pulse 90   Temp 96.9 °F (36.1 °C)   Ht 182.9 cm (72\")   Wt 90.7 kg (200 lb)   SpO2 97%   BMI 27.12 kg/m²       BP Readings from Last 3 Encounters:   03/23/20 110/58   02/03/20 120/64   01/31/20 124/83     Wt Readings from Last 3 Encounters:   03/23/20 90.7 kg (200 lb)   02/03/20 88.9 kg (196 lb)   01/31/20 86.2 kg (190 lb)      Body mass index is 27.12 kg/m².    PHYSICAL EXAMINATION     Physical Exam   Constitutional: He is oriented to person, place, and time. He appears well-nourished. No distress.   Overweight    HENT:   Head: Normocephalic.   Right Ear: External ear normal.   Left Ear: External ear normal.   Nose: Nose normal.   Mouth/Throat: Oropharynx " is clear and moist.   Eyes: Pupils are equal, round, and reactive to light. Conjunctivae and EOM are normal. No scleral icterus.   Neck: Normal range of motion. Neck supple. No tracheal deviation present. No thyromegaly present.   Cardiovascular: Normal rate, regular rhythm, normal heart sounds and intact distal pulses. Exam reveals no gallop and no friction rub.   No murmur heard.  No carotid bruits   Pulmonary/Chest: Effort normal and breath sounds normal.   Abdominal: Soft. Normal appearance and bowel sounds are normal. He exhibits no distension and no mass. There is no hepatosplenomegaly. There is no tenderness. No hernia.   Genitourinary: Prostate normal, testes normal and penis normal.   Musculoskeletal: Normal range of motion. He exhibits no edema or deformity.   Lymphadenopathy:     He has no cervical adenopathy.     He has no axillary adenopathy.        Right: No inguinal and no supraclavicular adenopathy present.        Left: No inguinal and no supraclavicular adenopathy present.   Neurological: He is alert and oriented to person, place, and time. He has normal reflexes. He displays normal reflexes. No cranial nerve deficit. He exhibits normal muscle tone. Coordination normal.   Skin: Skin is intact. No lesion (Negative for suspicious skin lesions/growths) and no rash noted. No cyanosis or erythema. No pallor. Nails show no clubbing.   No jaundice  No suspicious skin lesions.    Psychiatric: He has a normal mood and affect. His behavior is normal. Judgment and thought content normal. Cognition and memory are normal.       REVIEWED DATA      Labs:    Lab Results   Component Value Date     03/16/2020    K 5.3 (H) 03/16/2020    CALCIUM 9.5 03/16/2020    AST 32 03/16/2020    ALT 30 03/16/2020    BUN 17 03/16/2020    CREATININE 0.93 03/16/2020    CREATININE 0.96 11/08/2019    CREATININE 0.82 01/02/2019    EGFRIFNONA 82 03/16/2020    EGFRIFAFRI 99 03/16/2020       Lab Results   Component Value Date    GLU  107 (H) 03/16/2020    TSH 3.200 11/08/2019    FREET4 1.04 11/08/2019       Lab Results   Component Value Date    PSA 1.930 01/02/2019       Lab Results   Component Value Date    LDL 93 03/16/2020    HDL 56 03/16/2020    TRIG 73 03/16/2020    CHOLHDLRATIO 2.93 03/16/2020       No components found for: [id number]    Lab Results   Component Value Date    WBC 5.37 01/02/2019    HGB 14.1 01/02/2019    MCV 93.4 01/02/2019     01/02/2019       Lab Results   Component Value Date    PROTEIN Negative 01/02/2019    GLUCOSEU Negative 01/02/2019    BLOODU Negative 01/02/2019    NITRITEU Negative 01/02/2019    LEUKOCYTESUR Negative 01/02/2019        No results found for: HEPCVIRUSABY    Imaging:           Medical Tests:       ______________________________________________________________________    ASSESSMENT & PLAN     ANNUAL WELLNESS EXAM / PHYSICAL     Other medical problems addressed today:  Problem List Items Addressed This Visit     None      Visit Diagnoses     Encounter for annual health examination    -  Primary    Relevant Orders    CBC & Differential    Hemoglobin A1c    Hepatitis C Antibody    PSA Screen    TSH+Free T4    Urinalysis With Microscopic If Indicated (No Culture) - Urine, Clean Catch          Summary/Discussion:     · Recommended looking at coming off Adderall XR over 1 year.   · See me for back pain issue or discuss this on next follow-up in May.     Next Appointment with me: 5/4/2020    Return for Next scheduled follow up.      HEALTHCARE MAINTENANCE ISSUES       Cancer Screening:  · Colon: Initial/Next screening at age: CURRENT  · Repeat colon cancer screening: every 5 years  · Prostate: Performed today and recommended annual screening  · Testicular: Recommended monthly self exam  · Skin: Monthly self skin examination, annual exam by health professional  · Lung: Does not meet criteria for lung cancer screening.   · Other:    Screening Labs & Tests:  · Lab results reviewed & discussed with with  patient or orders placed today.  · EKG:  · CV Screening: No special screening needed  · DEXA (75+ or risk factors):   · HEP C (If born [id number] or risk factors): Ordered  · Other:     Immunization/Vaccinations (to be given today unless deferred by patient)  · Influenza: Patient had the flu shot this season  · Hepatitis A: Up to date  · Tetanus/Pertussis: Up to date  · Pneumovax: Not needed at this time  · Shingles: Recommended Shingrix at pharmacy  · Other:     Lifestyle Counseling:  · Lifestyle Modifications: Attempt to lose weight, Improve dietary compliance, Maintain a low sugar/carbohydrate diet and Follow a low fat, low cholesterol diet  · Safety Issues: Always wear seatbelt, Avoid texting while driving   · Use sunscreen, regular skin examination  · Recommended annual dental/vision examination.  · Emotional/Stress/Sleep: Reviewed and  given when appropriate      Health Maintenance   Topic Date Due   • ZOSTER VACCINE (2 of 3) 02/09/2016   • HEPATITIS C SCREENING  03/07/2016   • PROSTATE CANCER SCREENING  03/23/2020   • LIPID PANEL  03/16/2021   • ANNUAL PHYSICAL  03/24/2021   • COLONOSCOPY  03/22/2024   • TDAP/TD VACCINES (3 - Td) 01/08/2029   • INFLUENZA VACCINE  Completed         **Dragon Disclaimer:   Much of this encounter note is an electronic transcription/translation of spoken language to printed text. The electronic translation of spoken language may permit erroneous, or at times, nonsensical words or phrases to be inadvertently transcribed. Although I have reviewed the note for such errors, some may still exist.       Template created by Tor Dewey MD

## 2020-03-23 NOTE — PATIENT INSTRUCTIONS
** IMPORTANT MESSAGE FROM DR. HOOD **    In our office, your satisfaction is VERY important to us.     You may receive a survey from [facility] by mail or E-mail for you to provide feedback about your visit. This information is invaluable for me to know what we can do to improve our services.     I ask that you please take a few minutes to complete the survey and let us know how we are doing in serving your needs. (You may receive the survey more than once for multiple visits)    Thank You !    Dr. Hood & Staff    _________________________________________________________________________________________________________________________      ** ADDITIONAL INSTRUCTION / REMINDERS FROM DR. HOOD **

## 2020-03-24 ENCOUNTER — TELEPHONE (OUTPATIENT)
Dept: INTERNAL MEDICINE | Age: 65
End: 2020-03-24

## 2020-03-24 DIAGNOSIS — N40.0 ENLARGED PROSTATE: Primary | ICD-10-CM

## 2020-03-24 DIAGNOSIS — R97.20 BPH WITH ELEVATED PSA: ICD-10-CM

## 2020-03-24 DIAGNOSIS — N40.0 BPH WITH ELEVATED PSA: ICD-10-CM

## 2020-03-24 LAB
APPEARANCE UR: CLEAR
BASOPHILS # BLD AUTO: 0.02 10*3/MM3 (ref 0–0.2)
BASOPHILS NFR BLD AUTO: 0.4 % (ref 0–1.5)
BILIRUB UR QL STRIP: NEGATIVE
COLOR UR: YELLOW
EOSINOPHIL # BLD AUTO: 0.32 10*3/MM3 (ref 0–0.4)
EOSINOPHIL NFR BLD AUTO: 5.7 % (ref 0.3–6.2)
ERYTHROCYTE [DISTWIDTH] IN BLOOD BY AUTOMATED COUNT: 12.7 % (ref 12.3–15.4)
GLUCOSE UR QL: NEGATIVE
HBA1C MFR BLD: 6.1 % (ref 4.8–5.6)
HCT VFR BLD AUTO: 39.9 % (ref 37.5–51)
HCV AB S/CO SERPL IA: <0.1 S/CO RATIO (ref 0–0.9)
HGB BLD-MCNC: 14.1 G/DL (ref 13–17.7)
HGB UR QL STRIP: NEGATIVE
IMM GRANULOCYTES # BLD AUTO: 0.01 10*3/MM3 (ref 0–0.05)
IMM GRANULOCYTES NFR BLD AUTO: 0.2 % (ref 0–0.5)
KETONES UR QL STRIP: ABNORMAL
LEUKOCYTE ESTERASE UR QL STRIP: NEGATIVE
LYMPHOCYTES # BLD AUTO: 2.06 10*3/MM3 (ref 0.7–3.1)
LYMPHOCYTES NFR BLD AUTO: 36.9 % (ref 19.6–45.3)
MCH RBC QN AUTO: 33 PG (ref 26.6–33)
MCHC RBC AUTO-ENTMCNC: 35.3 G/DL (ref 31.5–35.7)
MCV RBC AUTO: 93.4 FL (ref 79–97)
MONOCYTES # BLD AUTO: 0.5 10*3/MM3 (ref 0.1–0.9)
MONOCYTES NFR BLD AUTO: 8.9 % (ref 5–12)
NEUTROPHILS # BLD AUTO: 2.68 10*3/MM3 (ref 1.7–7)
NEUTROPHILS NFR BLD AUTO: 47.9 % (ref 42.7–76)
NITRITE UR QL STRIP: NEGATIVE
NRBC BLD AUTO-RTO: 0 /100 WBC (ref 0–0.2)
PH UR STRIP: <=5 [PH] (ref 5–8)
PLATELET # BLD AUTO: 193 10*3/MM3 (ref 140–450)
PROT UR QL STRIP: NEGATIVE
PSA SERPL-MCNC: 2.17 NG/ML (ref 0–4)
RBC # BLD AUTO: 4.27 10*6/MM3 (ref 4.14–5.8)
SP GR UR: ABNORMAL (ref 1–1.03)
T4 FREE SERPL-MCNC: 1.09 NG/DL (ref 0.93–1.7)
TSH SERPL DL<=0.005 MIU/L-ACNC: 2.04 UIU/ML (ref 0.27–4.2)
UROBILINOGEN UR STRIP-MCNC: ABNORMAL MG/DL
WBC # BLD AUTO: 5.59 10*3/MM3 (ref 3.4–10.8)

## 2020-03-24 NOTE — TELEPHONE ENCOUNTER
1) Labs showed A1c elevation of 6.1 which is consistent with new diagnosis of prediabetes.   - Maintain a low sugar/starch/carbohydrate diet and exercise regularly. Wt loss advised.     - Minimize / avoid any alcohol   - Keep next appointment set for 5/4/20 and we will recheck this then (add POCT A1c to schedule for that visit).     2) PSA level is elevated suggestive for enlarged prostate. Monitor for change negative for urinary symptoms. Recheck PSA in approximately 6 months.

## 2020-03-24 NOTE — TELEPHONE ENCOUNTER
----- Message from Christiano Dewey MD sent at 3/24/2020  7:49 AM EDT -----  Regarding: CALL PATIENT REGARDING ABNORMAL LAB RESULTS  Call patient with lab results:  COPY THIS TO NOTE IN THE CHART PLEASE.     1) Labs showed A1c elevation of 6.1 which is consistent with new diagnosis of prediabetes.   - Maintain a low sugar/starch/carbohydrate diet and exercise regularly. Wt loss advised.    - Minimize / avoid any alcohol   - Keep next appointment set for 5/4/20 and we will recheck this then (add POCT A1c to schedule for that visit).     2) PSA level is elevated suggestive for enlarged prostate. Monitor for change negative for urinary symptoms. Recheck PSA in approximately 6 months.

## 2020-04-15 DIAGNOSIS — F98.8 ATTENTION DEFICIT DISORDER (ADD) WITHOUT HYPERACTIVITY: ICD-10-CM

## 2020-04-15 RX ORDER — DEXTROAMPHETAMINE SACCHARATE, AMPHETAMINE ASPARTATE MONOHYDRATE, DEXTROAMPHETAMINE SULFATE AND AMPHETAMINE SULFATE 1.25; 1.25; 1.25; 1.25 MG/1; MG/1; MG/1; MG/1
5 CAPSULE, EXTENDED RELEASE ORAL EVERY MORNING
Qty: 30 CAPSULE | Refills: 0 | Status: SHIPPED | OUTPATIENT
Start: 2020-04-15 | End: 2020-05-12 | Stop reason: SDUPTHER

## 2020-05-04 ENCOUNTER — OFFICE VISIT (OUTPATIENT)
Dept: INTERNAL MEDICINE | Age: 65
End: 2020-05-04

## 2020-05-04 VITALS
OXYGEN SATURATION: 98 % | DIASTOLIC BLOOD PRESSURE: 80 MMHG | HEART RATE: 75 BPM | TEMPERATURE: 97.9 F | BODY MASS INDEX: 26.28 KG/M2 | SYSTOLIC BLOOD PRESSURE: 120 MMHG | HEIGHT: 72 IN | WEIGHT: 194 LBS

## 2020-05-04 DIAGNOSIS — F98.8 ATTENTION DEFICIT DISORDER (ADD) WITHOUT HYPERACTIVITY: Primary | Chronic | ICD-10-CM

## 2020-05-04 DIAGNOSIS — R73.03 PREDIABETES: ICD-10-CM

## 2020-05-04 DIAGNOSIS — E78.00 PURE HYPERCHOLESTEROLEMIA: Chronic | ICD-10-CM

## 2020-05-04 DIAGNOSIS — R45.4 IRRITABILITY: Chronic | ICD-10-CM

## 2020-05-04 DIAGNOSIS — Z23 NEED FOR SHINGLES VACCINE: ICD-10-CM

## 2020-05-04 PROCEDURE — 90471 IMMUNIZATION ADMIN: CPT | Performed by: INTERNAL MEDICINE

## 2020-05-04 PROCEDURE — 99214 OFFICE O/P EST MOD 30 MIN: CPT | Performed by: INTERNAL MEDICINE

## 2020-05-04 PROCEDURE — 90750 HZV VACC RECOMBINANT IM: CPT | Performed by: INTERNAL MEDICINE

## 2020-05-04 NOTE — ASSESSMENT & PLAN NOTE
Continue atorvastatin 20 mg.   Lab Results   Component Value Date    LDL 93 03/16/2020     (H) 11/08/2019     (H) 01/02/2019    HDL 56 03/16/2020

## 2020-05-04 NOTE — PROGRESS NOTES
[facility] [place]  SLOANE HOOD M.D.      Luca Carolina . / 64 y.o. / male  05/04/2020      CHIEF COMPLAINT     ADD and Prediabetes      ASSESSMENT & PLAN     Problem List Items Addressed This Visit        High    Attention deficit disorder (ADD) without hyperactivity - Primary (Chronic)    Overview     Neuropsychological evaluation ~2007.          Current Assessment & Plan     He may be interested in weaning off Adderall XR.          Relevant Medications    sertraline (ZOLOFT) 25 MG tablet    amphetamine-dextroamphetamine XR (ADDERALL XR) 5 MG 24 hr capsule       Medium    Pure hypercholesterolemia (Chronic)    Current Assessment & Plan     Continue atorvastatin 20 mg.   Lab Results   Component Value Date    LDL 93 03/16/2020     (H) 11/08/2019     (H) 01/02/2019    HDL 56 03/16/2020             Relevant Medications    atorvastatin (LIPITOR) 20 MG tablet    Irritability (Chronic)    Current Assessment & Plan     May be able to wean off sertraline if he chooses to come off Adderall XR.          Relevant Medications    sertraline (ZOLOFT) 25 MG tablet    Prediabetes (Chronic)    Current Assessment & Plan     Continue dietary modifications. Wt loss of 6 lbs noted since 3/2020.   Lab Results   Component Value Date     (H) 03/16/2020    GLU 92 11/08/2019     (H) 01/02/2019     Lab Results   Component Value Date    HGBA1C 6.10 (H) 03/23/2020               Other Visit Diagnoses     Need for shingles vaccine        Relevant Orders    Shingrix Vaccine (Completed)        Orders Placed This Encounter   Procedures   • Shingrix Vaccine     No orders of the defined types were placed in this encounter.      Summary/Discussion:      Next Appointment with me: Visit date not found    Return in about 3 months (around 8/4/2020) for ADD/ADHD (6 mos if he comes off Adderall XR).      MEDICATIONS     Current Outpatient Medications   Medication Sig Dispense Refill   • amphetamine-dextroamphetamine XR  "(ADDERALL XR) 5 MG 24 hr capsule Take 1 capsule by mouth Every Morning 30 capsule 0   • atorvastatin (LIPITOR) 20 MG tablet Take 1 tablet by mouth Daily. 90 tablet 3   • ibuprofen (ADVIL,MOTRIN) 200 MG tablet Take 200 mg by mouth every 6 (six) hours as needed for mild pain (1-3).     • Loratadine 10 MG capsule Take 1 tablet by mouth Daily.     • sertraline (ZOLOFT) 25 MG tablet Take 1 tablet by mouth Daily. 90 tablet 1   • Triamcinolone Acetonide (NASACORT) 55 MCG/ACT nasal inhaler 2 sprays into each nostril daily.       No current facility-administered medications for this visit.           VITAL SIGNS     Visit Vitals  /80   Pulse 75   Temp 97.9 °F (36.6 °C) (Temporal)   Ht 182.9 cm (72.01\")   Wt 88 kg (194 lb)   SpO2 98%   BMI 26.31 kg/m²       BP Readings from Last 3 Encounters:   05/04/20 120/80   03/23/20 110/58   02/03/20 120/64     Wt Readings from Last 3 Encounters:   05/04/20 88 kg (194 lb)   03/23/20 90.7 kg (200 lb)   02/03/20 88.9 kg (196 lb)      Body mass index is 26.31 kg/m².      HISTORY OF PRESENT ILLNESS     ADD: on Adderall XR 5 mg daily and sertraline for irritability related to medication use. He is considering coming off Adderall XR.     Hyperlipidemia: improved taking atorvastatin regularly.   Lab Results   Component Value Date    LDL 93 03/16/2020     (H) 11/08/2019     (H) 01/02/2019    HDL 56 03/16/2020      Prediabetes: A1c was 6.1 previously. Lost 6 lbs with dietary changes.       Patient Care Team:  Christiano Dewey MD as PCP - General (Internal Medicine)      REVIEW OF SYSTEMS     Review of Systems  Constitutional neg x intended wt loss   Resp neg  CV neg  Psych stable mood, focus, attention    PHYSICAL EXAMINATION     Physical Exam  Constitutional  No distress  Psychiatric  Alert. Judgment intact. Thought content normal. Mood normal      REVIEWED DATA     Labs:     Lab Results   Component Value Date     03/16/2020    K 5.3 (H) 03/16/2020    CALCIUM 9.5 03/16/2020 "    AST 32 03/16/2020    ALT 30 03/16/2020    BUN 17 03/16/2020    CREATININE 0.93 03/16/2020    CREATININE 0.96 11/08/2019    CREATININE 0.82 01/02/2019    EGFRIFNONA 82 03/16/2020    EGFRIFAFRI 99 03/16/2020       Lab Results   Component Value Date    HGBA1C 6.10 (H) 03/23/2020     (H) 03/16/2020    GLU 92 11/08/2019     (H) 01/02/2019       Lab Results   Component Value Date    LDL 93 03/16/2020     (H) 11/08/2019     (H) 01/02/2019    HDL 56 03/16/2020    HDL 58 11/08/2019    HDL 62 (H) 01/02/2019    TRIG 73 03/16/2020    TRIG 113 11/08/2019    TRIG 143 01/02/2019    CHOLHDLRATIO 2.93 03/16/2020    CHOLHDLRATIO 4.2 11/08/2019       Lab Results   Component Value Date    TSH 2.040 03/23/2020    FREET4 1.09 03/23/2020       Lab Results   Component Value Date    WBC 5.59 03/23/2020    HGB 14.1 03/23/2020    HGB 14.1 01/02/2019     03/23/2020       Lab Results   Component Value Date    PROTEIN Negative 03/23/2020    GLUCOSEU Negative 03/23/2020    BLOODU Negative 03/23/2020    NITRITEU Negative 03/23/2020    LEUKOCYTESUR Negative 03/23/2020       Imaging:         Medical Tests:         Summary of old records / correspondence / consultant report:         Request outside records:         ALLERGIES  No Known Allergies     PFSH:     The following portions of the patient's history were reviewed and updated as appropriate: Allergies / Current Medications / Past Medical History / Surgical History / Social History / Family History    PROBLEM LIST   Patient Active Problem List   Diagnosis   • Attention deficit disorder (ADD) without hyperactivity   • Allergic rhinitis due to pollen   • Pure hypercholesterolemia   • Irritability   • Prediabetes       PAST MEDICAL HISTORY  Past Medical History:   Diagnosis Date   • ADD (attention deficit disorder) 6/10/2016   • Allergic rhinitis due to pollen 6/10/2016   • HLD (hyperlipidemia) 6/10/2016       SURGICAL HISTORY  Past Surgical History:   Procedure  Laterality Date   • COLONOSCOPY  03/18/2009    LAURIE Maya   • COLONOSCOPY N/A 2009    Dr. Zacarias   • COLONOSCOPY N/A 3/22/2019    Procedure: COLONOSCOPY to cecum/TI;  Surgeon: Georges Zacarias Jr., MD;  Location: [facility] ENDOSCOPY;  Service: General   • ENDOSCOPY N/A 3/22/2019    Procedure: ESOPHAGOGASTRODUODENOSCOPY;  Surgeon: Georges Zacarias Jr., MD;  Location: [facility] ENDOSCOPY;  Service: General   • HEMORRHOIDECTOMY     • SINUS SURGERY         SOCIAL HISTORY  Social History     Socioeconomic History   • Marital status:      Spouse name: Stacie*   • Number of children: 2   • Years of education: Not on file   • Highest education level: Not on file   Occupational History   • Occupation: Sales   Tobacco Use   • Smoking status: Never Smoker   • Smokeless tobacco: Never Used   Substance and Sexual Activity   • Alcohol use: Yes     Frequency: 2-3 times a week     Drinks per session: 1 or 2   • Drug use: No   • Sexual activity: Yes   Lifestyle   • Physical activity:     Days per week: 7 days     Minutes per session: Not on file   • Stress: To some extent       FAMILY HISTORY  Family History   Problem Relation Age of Onset   • No Known Problems Mother    • Aortic stenosis Father         complications   • ADD / ADHD Daughter    • Anxiety disorder Son    • No Known Problems Sister    • No Known Problems Brother    • No Known Problems Sister    • Drug abuse Neg Hx    • Depression Neg Hx    • Alcohol abuse Neg Hx    • Colon cancer Neg Hx    • Prostate cancer Neg Hx          **Dragon Disclaimer:   Much of this encounter note is an electronic transcription/translation of spoken language to printed text. The electronic translation of spoken language may permit erroneous, or at times, nonsensical words or phrases to be inadvertently transcribed. Although I have reviewed the note for such errors, some may still exist.     Template created by Tor Dewey MD

## 2020-05-04 NOTE — ASSESSMENT & PLAN NOTE
Continue dietary modifications. Wt loss of 6 lbs noted since 3/2020.   Lab Results   Component Value Date     (H) 03/16/2020    GLU 92 11/08/2019     (H) 01/02/2019     Lab Results   Component Value Date    HGBA1C 6.10 (H) 03/23/2020

## 2020-05-12 DIAGNOSIS — F98.8 ATTENTION DEFICIT DISORDER (ADD) WITHOUT HYPERACTIVITY: ICD-10-CM

## 2020-05-12 RX ORDER — DEXTROAMPHETAMINE SACCHARATE, AMPHETAMINE ASPARTATE MONOHYDRATE, DEXTROAMPHETAMINE SULFATE AND AMPHETAMINE SULFATE 1.25; 1.25; 1.25; 1.25 MG/1; MG/1; MG/1; MG/1
5 CAPSULE, EXTENDED RELEASE ORAL EVERY MORNING
Qty: 30 CAPSULE | Refills: 0 | Status: SHIPPED | OUTPATIENT
Start: 2020-05-12 | End: 2020-06-19 | Stop reason: SDUPTHER

## 2020-05-22 DIAGNOSIS — R45.4 IRRITABILITY: ICD-10-CM

## 2020-05-22 RX ORDER — SERTRALINE HYDROCHLORIDE 25 MG/1
25 TABLET, FILM COATED ORAL DAILY
Qty: 90 TABLET | Refills: 1 | Status: SHIPPED | OUTPATIENT
Start: 2020-05-22 | End: 2020-11-18

## 2020-06-19 DIAGNOSIS — F98.8 ATTENTION DEFICIT DISORDER (ADD) WITHOUT HYPERACTIVITY: ICD-10-CM

## 2020-06-19 RX ORDER — DEXTROAMPHETAMINE SACCHARATE, AMPHETAMINE ASPARTATE MONOHYDRATE, DEXTROAMPHETAMINE SULFATE AND AMPHETAMINE SULFATE 1.25; 1.25; 1.25; 1.25 MG/1; MG/1; MG/1; MG/1
5 CAPSULE, EXTENDED RELEASE ORAL EVERY MORNING
Qty: 30 CAPSULE | Refills: 0 | Status: SHIPPED | OUTPATIENT
Start: 2020-06-19 | End: 2020-07-20 | Stop reason: SDUPTHER

## 2020-07-20 DIAGNOSIS — F98.8 ATTENTION DEFICIT DISORDER (ADD) WITHOUT HYPERACTIVITY: ICD-10-CM

## 2020-07-20 RX ORDER — DEXTROAMPHETAMINE SACCHARATE, AMPHETAMINE ASPARTATE MONOHYDRATE, DEXTROAMPHETAMINE SULFATE AND AMPHETAMINE SULFATE 1.25; 1.25; 1.25; 1.25 MG/1; MG/1; MG/1; MG/1
5 CAPSULE, EXTENDED RELEASE ORAL EVERY MORNING
Qty: 30 CAPSULE | Refills: 0 | Status: SHIPPED | OUTPATIENT
Start: 2020-07-20 | End: 2020-07-22 | Stop reason: SDUPTHER

## 2020-07-22 DIAGNOSIS — F98.8 ATTENTION DEFICIT DISORDER (ADD) WITHOUT HYPERACTIVITY: ICD-10-CM

## 2020-07-22 RX ORDER — DEXTROAMPHETAMINE SACCHARATE, AMPHETAMINE ASPARTATE MONOHYDRATE, DEXTROAMPHETAMINE SULFATE AND AMPHETAMINE SULFATE 1.25; 1.25; 1.25; 1.25 MG/1; MG/1; MG/1; MG/1
5 CAPSULE, EXTENDED RELEASE ORAL EVERY MORNING
Qty: 30 CAPSULE | Refills: 0 | Status: SHIPPED | OUTPATIENT
Start: 2020-07-22 | End: 2020-08-19 | Stop reason: SDUPTHER

## 2020-08-19 ENCOUNTER — OFFICE VISIT (OUTPATIENT)
Dept: INTERNAL MEDICINE | Age: 65
End: 2020-08-19

## 2020-08-19 VITALS
BODY MASS INDEX: 26.55 KG/M2 | DIASTOLIC BLOOD PRESSURE: 68 MMHG | HEIGHT: 72 IN | WEIGHT: 196 LBS | HEART RATE: 71 BPM | OXYGEN SATURATION: 98 % | TEMPERATURE: 96.8 F | SYSTOLIC BLOOD PRESSURE: 112 MMHG

## 2020-08-19 DIAGNOSIS — R73.03 PREDIABETES: Primary | Chronic | ICD-10-CM

## 2020-08-19 DIAGNOSIS — F98.8 ATTENTION DEFICIT DISORDER (ADD) WITHOUT HYPERACTIVITY: Chronic | ICD-10-CM

## 2020-08-19 PROCEDURE — 99213 OFFICE O/P EST LOW 20 MIN: CPT | Performed by: INTERNAL MEDICINE

## 2020-08-19 RX ORDER — DEXTROAMPHETAMINE SACCHARATE, AMPHETAMINE ASPARTATE MONOHYDRATE, DEXTROAMPHETAMINE SULFATE AND AMPHETAMINE SULFATE 1.25; 1.25; 1.25; 1.25 MG/1; MG/1; MG/1; MG/1
5 CAPSULE, EXTENDED RELEASE ORAL EVERY MORNING
Qty: 30 CAPSULE | Refills: 0 | Status: SHIPPED | OUTPATIENT
Start: 2020-08-19 | End: 2020-09-21 | Stop reason: SDUPTHER

## 2020-08-19 NOTE — ASSESSMENT & PLAN NOTE
He would like to continue low dose Adderall XR 5 mg qd for now.   Medication refilled.     Reviewed latest [facility] . Reviewed most recent UDS or ordered UDS. Patient is having medication refilled at expected intervals. Using medication appropriately without evidence of unusual increased use, abuse, or diverting.

## 2020-08-19 NOTE — ASSESSMENT & PLAN NOTE
Check A1c on follow-up in 3 months. Maintain a low sugar/starch/carbohydrate diet and exercise regularly. Wt loss advised.  Avoid alcohol.

## 2020-08-19 NOTE — PROGRESS NOTES
[facility] [place] [facility]  SLOANE HOOD M.D.      Luca MARTIN Carolina Jr. / 64 y.o. / male  08/19/2020      CHIEF COMPLAINT     Attention deficit disorder (ADD) without hyperactivity and Prediabetes      ASSESSMENT & PLAN     Problem List Items Addressed This Visit        High    Attention deficit disorder (ADD) without hyperactivity (Chronic)    Overview     Neuropsychological evaluation ~2007.          Current Assessment & Plan     He would like to continue low dose Adderall XR 5 mg qd for now.   Medication refilled.     Reviewed latest MALLORIE . Reviewed most recent UDS or ordered UDS. Patient is having medication refilled at expected intervals. Using medication appropriately without evidence of unusual increased use, abuse, or diverting.          Relevant Medications    sertraline (ZOLOFT) 25 MG tablet    amphetamine-dextroamphetamine XR (ADDERALL XR) 5 MG 24 hr capsule    Prediabetes - Primary (Chronic)    Current Assessment & Plan     Check A1c on follow-up in 3 months. Maintain a low sugar/starch/carbohydrate diet and exercise regularly. Wt loss advised.  Avoid alcohol.              No orders of the defined types were placed in this encounter.    New Medications Ordered This Visit   Medications   • amphetamine-dextroamphetamine XR (ADDERALL XR) 5 MG 24 hr capsule     Sig: Take 1 capsule by mouth Every Morning     Dispense:  30 capsule     Refill:  0       Summary/Discussion:  •       Next Appointment with me: 11/23/2020    Return in about 3 months (around 11/19/2020) for ADD/ADHD, Prediabetes.    ____________________________________________________________________    MEDICATIONS     Current Outpatient Medications   Medication Sig Dispense Refill   • amphetamine-dextroamphetamine XR (ADDERALL XR) 5 MG 24 hr capsule Take 1 capsule by mouth Every Morning 30 capsule 0   • atorvastatin (LIPITOR) 20 MG tablet Take 1 tablet by mouth Daily. 90 tablet 3   • ibuprofen (ADVIL,MOTRIN) 200 MG tablet Take 200 mg by mouth every 6  "(six) hours as needed for mild pain (1-3).     • Loratadine 10 MG capsule Take 1 tablet by mouth Daily.     • sertraline (ZOLOFT) 25 MG tablet Take 1 tablet by mouth Daily. 90 tablet 1   • Triamcinolone Acetonide (NASACORT) 55 MCG/ACT nasal inhaler 2 sprays into each nostril daily.       No current facility-administered medications for this visit.         VITAL SIGNS       Visit Vitals  /68   Pulse 71   Temp 96.8 °F (36 °C)   Ht 182.9 cm (72.01\")   Wt 88.9 kg (196 lb)   SpO2 98%   BMI 26.57 kg/m²       BP Readings from Last 3 Encounters:   08/19/20 112/68   05/04/20 120/80   03/23/20 110/58     Wt Readings from Last 3 Encounters:   08/19/20 88.9 kg (196 lb)   05/04/20 88 kg (194 lb)   03/23/20 90.7 kg (200 lb)      Body mass index is 26.57 kg/m².      HISTORY OF PRESENT ILLNESS     ADD:  Takes Adderall XR 5 mg without significant problems. Medication continues to help w/ focus/attention and productivity. Uses medication appropriately and as prescribed.     Prediabetes:  weight has not changed significantly and not on metformin. Defers medication. Most recent A1c level(s):   Lab Results   Component Value Date    HGBA1C 6.10 (H) 03/23/2020          Patient Care Team:  Christiano Dewey MD as PCP - General (Internal Medicine)  ____________________________________________________________________    REVIEW OF SYSTEMS     Review of Systems  No significant wt change  Low back pain (plans to see spine specialist)  Attention/focus stable; mood stable     PHYSICAL EXAM      Physical Exam  Alert with normal thought and judgment      REVIEWED DATA     Labs:     Lab Results   Component Value Date     03/16/2020    K 5.3 (H) 03/16/2020    CALCIUM 9.5 03/16/2020    AST 32 03/16/2020    ALT 30 03/16/2020    BUN 17 03/16/2020    CREATININE 0.93 03/16/2020    CREATININE 0.96 11/08/2019    CREATININE 0.82 01/02/2019    EGFRIFNONA 82 03/16/2020    EGFRIFAFRI 99 03/16/2020       Lab Results   Component Value Date    HGBA1C 6.10 " (H) 03/23/2020     (H) 03/16/2020    GLU 92 11/08/2019     (H) 01/02/2019       Lab Results   Component Value Date    LDL 93 03/16/2020     (H) 11/08/2019     (H) 01/02/2019    HDL 56 03/16/2020    HDL 58 11/08/2019    HDL 62 (H) 01/02/2019    TRIG 73 03/16/2020    TRIG 113 11/08/2019    TRIG 143 01/02/2019    CHOLHDLRATIO 2.93 03/16/2020    CHOLHDLRATIO 4.2 11/08/2019       Lab Results   Component Value Date    TSH 2.040 03/23/2020    FREET4 1.09 03/23/2020       Lab Results   Component Value Date    WBC 5.59 03/23/2020    HGB 14.1 03/23/2020    HGB 14.1 01/02/2019     03/23/2020       Lab Results   Component Value Date    PROTEIN Negative 03/23/2020    GLUCOSEU Negative 03/23/2020    BLOODU Negative 03/23/2020    NITRITEU Negative 03/23/2020    LEUKOCYTESUR Negative 03/23/2020        Imaging:         Medical Tests:         Summary of old records / correspondence / consultant report:         Request outside records:         ALLERGIES  No Known Allergies     PFSH:     The following portions of the patient's history were reviewed and updated as appropriate: Allergies / Current Medications / Past Medical History / Surgical History / Social History / Family History    PROBLEM LIST   Patient Active Problem List   Diagnosis   • Attention deficit disorder (ADD) without hyperactivity   • Allergic rhinitis due to pollen   • Pure hypercholesterolemia   • Irritability   • Prediabetes       PAST MEDICAL HISTORY  Past Medical History:   Diagnosis Date   • ADD (attention deficit disorder) 6/10/2016   • Allergic rhinitis due to pollen 6/10/2016   • HLD (hyperlipidemia) 6/10/2016       SURGICAL HISTORY  Past Surgical History:   Procedure Laterality Date   • COLONOSCOPY  03/18/2009    LAURIE Maya   • COLONOSCOPY N/A 2009    Dr. Zacarias   • COLONOSCOPY N/A 3/22/2019    Procedure: COLONOSCOPY to cecum/TI;  Surgeon: Georges Zacarias Jr., MD;  Location: [facility] ENDOSCOPY;  Service: General   •  ENDOSCOPY N/A 3/22/2019    Procedure: ESOPHAGOGASTRODUODENOSCOPY;  Surgeon: Georges Zacarias Jr., MD;  Location: [facility] ENDOSCOPY;  Service: General   • HEMORRHOIDECTOMY     • SINUS SURGERY         SOCIAL HISTORY  Social History     Socioeconomic History   • Marital status:      Spouse name: Stacie*   • Number of children: 2   • Years of education: Not on file   • Highest education level: Not on file   Occupational History   • Occupation: Sales   Tobacco Use   • Smoking status: Never Smoker   • Smokeless tobacco: Never Used   Substance and Sexual Activity   • Alcohol use: Yes     Frequency: 2-3 times a week     Drinks per session: 1 or 2   • Drug use: No   • Sexual activity: Yes   Lifestyle   • Physical activity:     Days per week: 7 days     Minutes per session: Not on file   • Stress: To some extent       FAMILY HISTORY  Family History   Problem Relation Age of Onset   • No Known Problems Mother    • Aortic stenosis Father         complications   • ADD / ADHD Daughter    • Anxiety disorder Son    • No Known Problems Sister    • No Known Problems Brother    • No Known Problems Sister    • Drug abuse Neg Hx    • Depression Neg Hx    • Alcohol abuse Neg Hx    • Colon cancer Neg Hx    • Prostate cancer Neg Hx            **Dragon Disclaimer:   Much of this encounter note is an electronic transcription/translation of spoken language to printed text. The electronic translation of spoken language may permit erroneous, or at times, nonsensical words or phrases to be inadvertently transcribed. Although I have reviewed the note for such errors, some may still exist.     Template created by Tor Dewey MD

## 2020-09-18 LAB — PSA SERPL-MCNC: 2.24 NG/ML (ref 0–4)

## 2020-09-20 ENCOUNTER — RESULTS ENCOUNTER (OUTPATIENT)
Dept: INTERNAL MEDICINE | Age: 65
End: 2020-09-20

## 2020-09-20 DIAGNOSIS — N40.0 BPH WITH ELEVATED PSA: ICD-10-CM

## 2020-09-20 DIAGNOSIS — R97.20 BPH WITH ELEVATED PSA: ICD-10-CM

## 2020-09-21 DIAGNOSIS — F98.8 ATTENTION DEFICIT DISORDER (ADD) WITHOUT HYPERACTIVITY: Chronic | ICD-10-CM

## 2020-09-21 RX ORDER — DEXTROAMPHETAMINE SACCHARATE, AMPHETAMINE ASPARTATE MONOHYDRATE, DEXTROAMPHETAMINE SULFATE AND AMPHETAMINE SULFATE 1.25; 1.25; 1.25; 1.25 MG/1; MG/1; MG/1; MG/1
5 CAPSULE, EXTENDED RELEASE ORAL EVERY MORNING
Qty: 30 CAPSULE | Refills: 0 | Status: SHIPPED | OUTPATIENT
Start: 2020-09-21 | End: 2020-10-19 | Stop reason: SDUPTHER

## 2020-09-21 NOTE — PROGRESS NOTES
Pal:    Luca, here are the result(s) of your test(s):     PSA level is slightly higher but nothing alarming. Recheck PSA annually.     Please do not hesitate to contact me if you have questions.

## 2020-10-19 DIAGNOSIS — F98.8 ATTENTION DEFICIT DISORDER (ADD) WITHOUT HYPERACTIVITY: Chronic | ICD-10-CM

## 2020-10-19 RX ORDER — DEXTROAMPHETAMINE SACCHARATE, AMPHETAMINE ASPARTATE MONOHYDRATE, DEXTROAMPHETAMINE SULFATE AND AMPHETAMINE SULFATE 1.25; 1.25; 1.25; 1.25 MG/1; MG/1; MG/1; MG/1
5 CAPSULE, EXTENDED RELEASE ORAL EVERY MORNING
Qty: 30 CAPSULE | Refills: 0 | Status: SHIPPED | OUTPATIENT
Start: 2020-10-19 | End: 2020-11-23 | Stop reason: SDUPTHER

## 2020-11-18 DIAGNOSIS — R45.4 IRRITABILITY: ICD-10-CM

## 2020-11-18 RX ORDER — SERTRALINE HYDROCHLORIDE 25 MG/1
25 TABLET, FILM COATED ORAL DAILY
Qty: 90 TABLET | Refills: 1 | Status: SHIPPED | OUTPATIENT
Start: 2020-11-18 | End: 2021-05-13

## 2020-11-23 ENCOUNTER — OFFICE VISIT (OUTPATIENT)
Dept: INTERNAL MEDICINE | Age: 65
End: 2020-11-23

## 2020-11-23 VITALS
HEART RATE: 79 BPM | TEMPERATURE: 97.5 F | WEIGHT: 202 LBS | BODY MASS INDEX: 27.36 KG/M2 | SYSTOLIC BLOOD PRESSURE: 134 MMHG | OXYGEN SATURATION: 97 % | DIASTOLIC BLOOD PRESSURE: 82 MMHG | HEIGHT: 72 IN

## 2020-11-23 DIAGNOSIS — R73.03 PREDIABETES: Chronic | ICD-10-CM

## 2020-11-23 DIAGNOSIS — E78.00 PURE HYPERCHOLESTEROLEMIA: Chronic | ICD-10-CM

## 2020-11-23 DIAGNOSIS — F98.8 ATTENTION DEFICIT DISORDER (ADD) WITHOUT HYPERACTIVITY: Primary | Chronic | ICD-10-CM

## 2020-11-23 DIAGNOSIS — Z51.81 THERAPEUTIC DRUG MONITORING: ICD-10-CM

## 2020-11-23 PROCEDURE — 99214 OFFICE O/P EST MOD 30 MIN: CPT | Performed by: INTERNAL MEDICINE

## 2020-11-23 RX ORDER — DEXTROAMPHETAMINE SACCHARATE, AMPHETAMINE ASPARTATE MONOHYDRATE, DEXTROAMPHETAMINE SULFATE AND AMPHETAMINE SULFATE 1.25; 1.25; 1.25; 1.25 MG/1; MG/1; MG/1; MG/1
5 CAPSULE, EXTENDED RELEASE ORAL EVERY MORNING
Qty: 30 CAPSULE | Refills: 0 | Status: SHIPPED | OUTPATIENT
Start: 2020-11-23 | End: 2020-12-24 | Stop reason: SDUPTHER

## 2020-11-23 NOTE — ASSESSMENT & PLAN NOTE
Updated contract.   Continue Adderall XR 5 mg qd. Refill will be sent to pharmacy.     Reviewed latest MALLORIE . Reviewed most recent UDS or ordered UDS. Patient is having medication refilled at expected intervals. Using medication appropriately without evidence of unusual increased use, abuse, or diverting.

## 2020-11-23 NOTE — PROGRESS NOTES
[facility] [place] [facility]  SLOANE HOOD M.D.      Luca Carolina Jr. / 65 y.o. / male  11/23/2020    CHIEF COMPLAINT     ADHD and Prediabetes      ASSESSMENT & PLAN     Problem List Items Addressed This Visit        High    Attention deficit disorder (ADD) without hyperactivity - Primary (Chronic)    Overview     Neuropsychological evaluation ~2007.          Current Assessment & Plan     Updated contract.   Continue Adderall XR 5 mg qd. Refill will be sent to pharmacy.     Reviewed latest MALLORIE . Reviewed most recent UDS or ordered UDS. Patient is having medication refilled at expected intervals. Using medication appropriately without evidence of unusual increased use, abuse, or diverting.          Relevant Medications    sertraline (ZOLOFT) 25 MG tablet    amphetamine-dextroamphetamine XR (ADDERALL XR) 5 MG 24 hr capsule    Other Relevant Orders    Compliance Drug Analysis, Ur - Urine, Clean Catch    Prediabetes (Chronic)    Relevant Orders    Hemoglobin A1c       Medium    Pure hypercholesterolemia (Chronic)    Relevant Medications    atorvastatin (LIPITOR) 20 MG tablet    Other Relevant Orders    Lipid Panel With / Chol / HDL Ratio    Comprehensive Metabolic Panel      Other Visit Diagnoses     Therapeutic drug monitoring        Relevant Orders    Compliance Drug Analysis, Ur - Urine, Clean Catch        Orders Placed This Encounter   Procedures   • Compliance Drug Analysis, Ur - Urine, Clean Catch   • Hemoglobin A1c   • Lipid Panel With / Chol / HDL Ratio   • Comprehensive Metabolic Panel     New Medications Ordered This Visit   Medications   • amphetamine-dextroamphetamine XR (ADDERALL XR) 5 MG 24 hr capsule     Sig: Take 1 capsule by mouth Every Morning     Dispense:  30 capsule     Refill:  0       Summary/Discussion:  •     Next Appointment with me: Visit date not found    Return in about 3 months (around 2/23/2021) for  "ADD/ADHD.    _____________________________________________________________________________________    VITAL SIGNS     Visit Vitals  /82   Pulse 79   Temp 97.5 °F (36.4 °C)   Ht 182.9 cm (72.01\")   Wt 91.6 kg (202 lb)   SpO2 97%   BMI 27.39 kg/m²       BP Readings from Last 3 Encounters:   11/23/20 134/82   10/29/20 132/90   08/19/20 112/68     Wt Readings from Last 3 Encounters:   11/23/20 91.6 kg (202 lb)   08/19/20 88.9 kg (196 lb)   05/04/20 88 kg (194 lb)     Body mass index is 27.39 kg/m².      MEDICATIONS     Current Outpatient Medications   Medication Sig Dispense Refill   • amphetamine-dextroamphetamine XR (ADDERALL XR) 5 MG 24 hr capsule Take 1 capsule by mouth Every Morning 30 capsule 0   • atorvastatin (LIPITOR) 20 MG tablet Take 1 tablet by mouth Daily. 90 tablet 3   • Loratadine 10 MG capsule Take 1 tablet by mouth Daily.     • meloxicam (MOBIC) 15 MG tablet      • sertraline (ZOLOFT) 25 MG tablet TAKE 1 TABLET BY MOUTH DAILY 90 tablet 1   • Triamcinolone Acetonide (NASACORT) 55 MCG/ACT nasal inhaler 2 sprays into each nostril daily.       No current facility-administered medications for this visit.        _____________________________________________________________________________________    HISTORY OF PRESENT ILLNESS     ADD: stable on Adderall XR 5 mg qd without significant side effects. Continues to help with focus, attention and work productivity.   On sertraline for irritability which is stable on medication.     Prediabetes:  Previously A1c level was increasing, weight gain noted and not on metformin. Most recent A1c level(s):   Lab Results   Component Value Date    HGBA1C 6.10 (H) 03/23/2020      Chronic hyperlipidemia:  Current therapy include atorvastatin, denies problems with medication.    Most recent labs:   Lab Results   Component Value Date    LDL 93 03/16/2020     (H) 11/08/2019     (H) 01/02/2019    HDL 56 03/16/2020    HDL 58 11/08/2019    HDL 62 (H) 01/02/2019    " TRIG 73 03/16/2020    CHOLHDLRATIO 2.93 03/16/2020    CHOLHDLRATIO 4.2 11/08/2019          Patient Care Team:  Christiano Dewey MD as PCP - General (Internal Medicine)      REVIEW OF SYSTEMS     Review of Systems  No chest pain or palpitations  Psych mood stable  Attention/focus stable on medication       PHYSICAL EXAMINATION     Physical Exam  Cardiovascular: Normal rate, regular rhythm and normal heart sounds.   Pulm/Chest: Effort normal, breath sounds normal.  Mood stable     REVIEWED DATA     Labs:     Lab Results   Component Value Date     03/16/2020    K 5.3 (H) 03/16/2020    CALCIUM 9.5 03/16/2020    AST 32 03/16/2020    ALT 30 03/16/2020    BUN 17 03/16/2020    CREATININE 0.93 03/16/2020    CREATININE 0.96 11/08/2019    CREATININE 0.82 01/02/2019    EGFRIFNONA 82 03/16/2020    EGFRIFAFRI 99 03/16/2020       Lab Results   Component Value Date    HGBA1C 6.10 (H) 03/23/2020     (H) 03/16/2020    GLU 92 11/08/2019     (H) 01/02/2019       Lab Results   Component Value Date    LDL 93 03/16/2020     (H) 11/08/2019     (H) 01/02/2019    HDL 56 03/16/2020    HDL 58 11/08/2019    HDL 62 (H) 01/02/2019    TRIG 73 03/16/2020    TRIG 113 11/08/2019    TRIG 143 01/02/2019    CHOLHDLRATIO 2.93 03/16/2020    CHOLHDLRATIO 4.2 11/08/2019       Lab Results   Component Value Date    TSH 2.040 03/23/2020    FREET4 1.09 03/23/2020       Lab Results   Component Value Date    WBC 5.59 03/23/2020    HGB 14.1 03/23/2020    HGB 14.1 01/02/2019     03/23/2020       Lab Results   Component Value Date    PROTEIN Negative 03/23/2020    GLUCOSEU Negative 03/23/2020    BLOODU Negative 03/23/2020    NITRITEU Negative 03/23/2020    LEUKOCYTESUR Negative 03/23/2020       Imaging:           Medical Tests:           Summary of old records / correspondence / consultant report:           Request outside records:           ALLERGIES  No Known Allergies     PFSH:     The following portions of the patient's  history were reviewed and updated as appropriate: Allergies / Current Medications / Past Medical History / Surgical History / Social History / Family History    PROBLEM LIST   Patient Active Problem List   Diagnosis   • Attention deficit disorder (ADD) without hyperactivity   • Allergic rhinitis due to pollen   • Pure hypercholesterolemia   • Irritability   • Prediabetes       PAST MEDICAL HISTORY  Past Medical History:   Diagnosis Date   • ADD (attention deficit disorder) 6/10/2016   • Allergic rhinitis due to pollen 6/10/2016   • HLD (hyperlipidemia) 6/10/2016       SURGICAL HISTORY  Past Surgical History:   Procedure Laterality Date   • COLONOSCOPY  03/18/2009    LAURIE Maya   • COLONOSCOPY N/A 2009    Dr. Zacarias   • COLONOSCOPY N/A 3/22/2019    Procedure: COLONOSCOPY to cecum/TI;  Surgeon: Georges Zacarias Jr., MD;  Location: [facility] ENDOSCOPY;  Service: General   • ENDOSCOPY N/A 3/22/2019    Procedure: ESOPHAGOGASTRODUODENOSCOPY;  Surgeon: Georges Zacarias Jr., MD;  Location: [facility] ENDOSCOPY;  Service: General   • HEMORRHOIDECTOMY     • SINUS SURGERY         SOCIAL HISTORY  Social History     Socioeconomic History   • Marital status:      Spouse name: Stacie*   • Number of children: 2   • Years of education: Not on file   • Highest education level: Not on file   Occupational History   • Occupation: Sales   Tobacco Use   • Smoking status: Never Smoker   • Smokeless tobacco: Never Used   Substance and Sexual Activity   • Alcohol use: Yes     Frequency: 2-3 times a week     Drinks per session: 1 or 2   • Drug use: No   • Sexual activity: Yes   Lifestyle   • Physical activity     Days per week: 7 days     Minutes per session: Not on file   • Stress: To some extent       FAMILY HISTORY  Family History   Problem Relation Age of Onset   • No Known Problems Mother    • Aortic stenosis Father         complications   • ADD / ADHD Daughter    • Anxiety disorder Son    • No Known Problems Sister    • No Known  Problems Brother    • No Known Problems Sister    • Drug abuse Neg Hx    • Depression Neg Hx    • Alcohol abuse Neg Hx    • Colon cancer Neg Hx    • Prostate cancer Neg Hx          Examiner was wearing KN95 mask, face shield and exam gloves during the entire duration of the visit. Patient was masked the entire time.   Minimum social distance of 6 ft maintained entire visit except if physical contact was necessary as documented.     **Dragon Disclaimer:   Much of this encounter note is an electronic transcription/translation of spoken language to printed text. The electronic translation of spoken language may permit erroneous, or at times, nonsensical words or phrases to be inadvertently transcribed. Although I have reviewed the note for such errors, some may still exist.     Template created by Tor Dewey MD

## 2020-11-24 DIAGNOSIS — F98.8 ATTENTION DEFICIT DISORDER (ADD) WITHOUT HYPERACTIVITY: Chronic | ICD-10-CM

## 2020-11-24 LAB
ALBUMIN SERPL-MCNC: 4.8 G/DL (ref 3.5–5.2)
ALBUMIN/GLOB SERPL: 2.7 G/DL
ALP SERPL-CCNC: 61 U/L (ref 39–117)
ALT SERPL-CCNC: 30 U/L (ref 1–41)
AST SERPL-CCNC: 23 U/L (ref 1–40)
BILIRUB SERPL-MCNC: 0.5 MG/DL (ref 0–1.2)
BUN SERPL-MCNC: 18 MG/DL (ref 8–23)
BUN/CREAT SERPL: 23.4 (ref 7–25)
CALCIUM SERPL-MCNC: 9.6 MG/DL (ref 8.6–10.5)
CHLORIDE SERPL-SCNC: 105 MMOL/L (ref 98–107)
CHOLEST SERPL-MCNC: 186 MG/DL (ref 0–200)
CHOLEST/HDLC SERPL: 2.66 {RATIO}
CO2 SERPL-SCNC: 31.5 MMOL/L (ref 22–29)
CREAT SERPL-MCNC: 0.77 MG/DL (ref 0.76–1.27)
GLOBULIN SER CALC-MCNC: 1.8 GM/DL
GLUCOSE SERPL-MCNC: 80 MG/DL (ref 65–99)
HBA1C MFR BLD: 5.7 % (ref 4.8–5.6)
HDLC SERPL-MCNC: 70 MG/DL (ref 40–60)
LDLC SERPL CALC-MCNC: 91 MG/DL (ref 0–100)
POTASSIUM SERPL-SCNC: 4.5 MMOL/L (ref 3.5–5.2)
PROT SERPL-MCNC: 6.6 G/DL (ref 6–8.5)
SODIUM SERPL-SCNC: 143 MMOL/L (ref 136–145)
TRIGL SERPL-MCNC: 146 MG/DL (ref 0–150)
VLDLC SERPL CALC-MCNC: 25 MG/DL (ref 5–40)

## 2020-11-24 RX ORDER — DEXTROAMPHETAMINE SACCHARATE, AMPHETAMINE ASPARTATE MONOHYDRATE, DEXTROAMPHETAMINE SULFATE AND AMPHETAMINE SULFATE 1.25; 1.25; 1.25; 1.25 MG/1; MG/1; MG/1; MG/1
5 CAPSULE, EXTENDED RELEASE ORAL EVERY MORNING
Qty: 30 CAPSULE | Refills: 0 | OUTPATIENT
Start: 2020-11-24

## 2020-11-25 NOTE — PROGRESS NOTES
Pal:    Luca, here are the result(s) of your test(s):     A1c for average glucose level is improving.   Cholesterol level is overall stable  Kidney and liver function are within normal     Please do not hesitate to contact me if you have questions.

## 2020-11-29 LAB — DRUGS UR: NORMAL

## 2020-12-08 RX ORDER — ATORVASTATIN CALCIUM 20 MG/1
20 TABLET, FILM COATED ORAL DAILY
Qty: 90 TABLET | Refills: 3 | Status: SHIPPED | OUTPATIENT
Start: 2020-12-08 | End: 2021-04-30

## 2020-12-24 DIAGNOSIS — F98.8 ATTENTION DEFICIT DISORDER (ADD) WITHOUT HYPERACTIVITY: Chronic | ICD-10-CM

## 2020-12-28 RX ORDER — DEXTROAMPHETAMINE SACCHARATE, AMPHETAMINE ASPARTATE MONOHYDRATE, DEXTROAMPHETAMINE SULFATE AND AMPHETAMINE SULFATE 1.25; 1.25; 1.25; 1.25 MG/1; MG/1; MG/1; MG/1
5 CAPSULE, EXTENDED RELEASE ORAL EVERY MORNING
Qty: 30 CAPSULE | Refills: 0 | Status: SHIPPED | OUTPATIENT
Start: 2020-12-28 | End: 2021-01-04

## 2021-01-13 ENCOUNTER — TELEPHONE (OUTPATIENT)
Dept: INTERNAL MEDICINE | Age: 66
End: 2021-01-13

## 2021-01-13 NOTE — TELEPHONE ENCOUNTER
Patient is having surgery with Mery on 2/1/21. He has had ekg and labs done at [facility]. Dr. Dewey does not have any appointments available. Where can I put him on the schedule?

## 2021-01-21 ENCOUNTER — OFFICE VISIT (OUTPATIENT)
Dept: INTERNAL MEDICINE | Age: 66
End: 2021-01-21

## 2021-01-21 ENCOUNTER — HOSPITAL ENCOUNTER (OUTPATIENT)
Dept: GENERAL RADIOLOGY | Facility: HOSPITAL | Age: 66
Discharge: HOME | End: 2021-01-21
Admitting: NURSE PRACTITIONER

## 2021-01-21 VITALS
TEMPERATURE: 96.4 F | HEART RATE: 72 BPM | DIASTOLIC BLOOD PRESSURE: 80 MMHG | SYSTOLIC BLOOD PRESSURE: 124 MMHG | HEIGHT: 72 IN | OXYGEN SATURATION: 98 % | WEIGHT: 203.8 LBS | BODY MASS INDEX: 27.6 KG/M2

## 2021-01-21 DIAGNOSIS — Z01.818 PRE-OPERATIVE CLEARANCE: ICD-10-CM

## 2021-01-21 DIAGNOSIS — Z01.818 PRE-OPERATIVE CLEARANCE: Primary | ICD-10-CM

## 2021-01-21 PROCEDURE — 71046 X-RAY EXAM CHEST 2 VIEWS: CPT

## 2021-01-21 PROCEDURE — 99213 OFFICE O/P EST LOW 20 MIN: CPT | Performed by: NURSE PRACTITIONER

## 2021-01-21 NOTE — PROGRESS NOTES
"[facility]  Jorge PersonOC      Luca Carolina . / 65 y.o. / male  01/21/2021      ASSESSMENT & PLAN:    1. Pre-operative clearance      Orders Placed This Encounter   Procedures   • XR Chest PA & Lateral       Summary/Discussion:  · Patient is cleared for surgery. Letter of clearance will be sent to the surgeon.      No follow-ups on file.    ____________________________________________________________________    MEDICATIONS  Current Outpatient Medications   Medication Sig Dispense Refill   • atorvastatin (LIPITOR) 20 MG tablet TAKE 1 TABLET BY MOUTH DAILY 90 tablet 3   • Loratadine 10 MG capsule Take 1 tablet by mouth Daily.     • meloxicam (MOBIC) 15 MG tablet Take 15 mg by mouth Daily.     • sertraline (ZOLOFT) 25 MG tablet TAKE 1 TABLET BY MOUTH DAILY 90 tablet 1   • Triamcinolone Acetonide (NASACORT) 55 MCG/ACT nasal inhaler 2 sprays into each nostril daily.       No current facility-administered medications for this visit.          VITALS:    Visit Vitals  /80   Pulse 72   Temp 96.4 °F (35.8 °C) (Temporal)   Ht 182.9 cm (72\")   Wt 92.4 kg (203 lb 12.8 oz)   SpO2 98%   BMI 27.64 kg/m²       BP Readings from Last 3 Encounters:   01/21/21 124/80   11/23/20 134/82   10/29/20 132/90     Wt Readings from Last 3 Encounters:   01/21/21 92.4 kg (203 lb 12.8 oz)   11/23/20 91.6 kg (202 lb)   08/19/20 88.9 kg (196 lb)      Body mass index is 27.64 kg/m².    CC:  Main reason(s) for today's visit: Surgical Clearance      HPI:     Date of planned surgery: 2/1/2021  Hospital: [facility]  Planned Surgery: Left anterior hip replacement  Primary Indication : Left hip osteoarthritis   Surgeon: Dr. Johnson  Anesthesia: General   Risk Factors: Prediabetes    EKG performed January 4, 2021 indicates normal sinus rhythm.  CBC was unremarkable and does not indicate any anemia, infection, or coagulation issues.  CMP showed normal kidney, liver, and potassium.  Sodium was slightly decreased at 135, but patient has " had normal sodium levels in the past.  No recent chest x-ray on file we will obtain this today with plan for general anesthesia.  Labs in our office were reviewed from November 2020.  Hemoglobin A1c 5.7.  Panel well controlled with statin.    Chest x-ray obtained today is normal.  No acute disease or any reason that prevent him from having total hip replacement.  From a medical standpoint he is cleared for surgery.      Patient Care Team:  Christiano Dewey MD as PCP - General (Internal Medicine)    ____________________________________________________________________    REVIEW OF SYSTEMS    Review of Systems  Constitutional neg  Resp neg  CV neg      PHYSICAL EXAMINATION    Physical Exam  Constitutional  No distress  Cardiovascular Rate  normal . Rhythm: regular . Heart sounds:  Normal  Pulmonary/Chest  Effort normal. Breath sounds:  Normal  Psychiatric  Alert. Judgment and thought content normal. Mood normal    REVIEWED DATA:    Labs:     Lab Results   Component Value Date     11/24/2020    K 4.5 11/24/2020    AST 23 11/24/2020    ALT 30 11/24/2020    BUN 18 11/24/2020    CREATININE 0.77 11/24/2020    CREATININE 0.93 03/16/2020    CREATININE 0.96 11/08/2019    EGFRIFNONA 101 11/24/2020    EGFRIFAFRI 123 11/24/2020       Lab Results   Component Value Date    HGBA1C 5.70 (H) 11/24/2020    HGBA1C 6.10 (H) 03/23/2020       Lab Results   Component Value Date    WBC 5.37 01/04/2021    HGB 14.3 01/04/2021    HGB 14.1 03/23/2020    HGB 14.1 01/02/2019     01/04/2021       Lab Results   Component Value Date    PROTEIN Negative 03/23/2020    GLUCOSEU Negative 03/23/2020    BLOODU Negative 03/23/2020    NITRITEU Negative 03/23/2020    LEUKOCYTESUR Negative 03/23/2020       Imaging:     Xr Chest Pa & Lateral    Result Date: 1/21/2021  Narrative: XR CHEST PA AND LATERAL-  Clinical: Preop left hip surgery  COMPARISON: None  FINDINGS: Heart size within normal limits. No mediastinal or hilar abnormality.  CONCLUSION:  Normal chest  This report was finalized on 1/21/2021 9:05 AM by Dr. Richard Cisneros M.D.           Medical Tests:     EKG  Completed by orthopedics at [facility] on 01/04/2021.   Sinus rhythm  HR: 70  NM: 160  DQRS:82  QT: 356  QTc 385  [place] 36  QrsAx : -3  Tax : 34  Normal EKG    Summary of old records / correspondence / consultant report:     Surgeon Note: As seen by Dr. Johnson for OP visit for left total hip arthroplasty anterior approach due to arthritis in left hip.  EKG, CBC, and CMP were obtained at this visit.  Patient was prescribed Bactroban, Tylenol, and ensures presurgery.  He discussed enhanced recovery after surgery with patient and anesthesia.  Labs and EKG were sufficient from an orthopedic standpoint.      Request outside records:   Dr Johnson has faxed EKG CBC and CMP from his office.  These were reviewed in great detail.      ALLERGIES  No Known Allergies     PFSH:     The following portions of the patient's history were reviewed and updated as appropriate: Allergies / Current Medications / Past Medical History / Surgical History / Social History / Family History    PROBLEM LIST   Patient Active Problem List   Diagnosis   • Attention deficit disorder (ADD) without hyperactivity   • Allergic rhinitis due to pollen   • Pure hypercholesterolemia   • Irritability   • Prediabetes       PAST MEDICAL HISTORY  Past Medical History:   Diagnosis Date   • ADD (attention deficit disorder) 6/10/2016   • Allergic rhinitis due to pollen 6/10/2016   • HLD (hyperlipidemia) 6/10/2016       SURGICAL HISTORY  Past Surgical History:   Procedure Laterality Date   • COLONOSCOPY  03/18/2009    LAURIE Maya   • COLONOSCOPY N/A 2009    Dr. Zacarias   • COLONOSCOPY N/A 3/22/2019    Procedure: COLONOSCOPY to cecum/TI;  Surgeon: Georges Zacarias Jr., MD;  Location: [facility] ENDOSCOPY;  Service: General   • ENDOSCOPY N/A 3/22/2019    Procedure: ESOPHAGOGASTRODUODENOSCOPY;  Surgeon: Georges Zacarias Jr., MD;   Location: [facility] ENDOSCOPY;  Service: General   • HEMORRHOIDECTOMY     • SINUS SURGERY         SOCIAL HISTORY  Social History     Socioeconomic History   • Marital status:      Spouse name: Stacie*   • Number of children: 2   • Years of education: Not on file   • Highest education level: Not on file   Occupational History   • Occupation: Sales   Tobacco Use   • Smoking status: Never Smoker   • Smokeless tobacco: Never Used   Substance and Sexual Activity   • Alcohol use: Yes     Frequency: 2-3 times a week     Drinks per session: 1 or 2   • Drug use: No   • Sexual activity: Yes   Lifestyle   • Physical activity     Days per week: 7 days     Minutes per session: Not on file   • Stress: To some extent       FAMILY HISTORY  Family History   Problem Relation Age of Onset   • No Known Problems Mother    • Aortic stenosis Father         complications   • ADD / ADHD Daughter    • Anxiety disorder Son    • No Known Problems Sister    • No Known Problems Brother    • No Known Problems Sister    • Drug abuse Neg Hx    • Depression Neg Hx    • Alcohol abuse Neg Hx    • Colon cancer Neg Hx    • Prostate cancer Neg Hx          **Dragon Disclaimer:   Much of this encounter note is an electronic transcription/translation of spoken language to printed text. The electronic translation of spoken language may permit erroneous, or at times, nonsensical words or phrases to be inadvertently transcribed. Although I have reviewed the note for such errors, some may still exist.

## 2021-03-03 ENCOUNTER — IMMUNIZATION (OUTPATIENT)
Dept: VACCINE CLINIC | Facility: HOSPITAL | Age: 66
End: 2021-03-03

## 2021-03-03 PROCEDURE — 91300 HC SARSCOV02 VAC 30MCG/0.3ML IM: CPT | Performed by: INTERNAL MEDICINE

## 2021-03-03 PROCEDURE — 0001A: CPT | Performed by: INTERNAL MEDICINE

## 2021-03-24 ENCOUNTER — IMMUNIZATION (OUTPATIENT)
Dept: VACCINE CLINIC | Facility: HOSPITAL | Age: 66
End: 2021-03-24

## 2021-03-24 PROCEDURE — 91300 HC SARSCOV02 VAC 30MCG/0.3ML IM: CPT | Performed by: INTERNAL MEDICINE

## 2021-03-24 PROCEDURE — 0002A: CPT | Performed by: INTERNAL MEDICINE

## 2021-04-02 ENCOUNTER — OFFICE VISIT (OUTPATIENT)
Dept: INTERNAL MEDICINE | Age: 66
End: 2021-04-02

## 2021-04-02 VITALS
HEART RATE: 78 BPM | BODY MASS INDEX: 25.6 KG/M2 | OXYGEN SATURATION: 97 % | SYSTOLIC BLOOD PRESSURE: 122 MMHG | TEMPERATURE: 97 F | DIASTOLIC BLOOD PRESSURE: 68 MMHG | HEIGHT: 72 IN | WEIGHT: 189 LBS

## 2021-04-02 DIAGNOSIS — N40.0 BPH WITH ELEVATED PSA: ICD-10-CM

## 2021-04-02 DIAGNOSIS — F98.8 ATTENTION DEFICIT DISORDER (ADD) WITHOUT HYPERACTIVITY: Chronic | ICD-10-CM

## 2021-04-02 DIAGNOSIS — R45.4 IRRITABILITY: Chronic | ICD-10-CM

## 2021-04-02 DIAGNOSIS — R97.20 BPH WITH ELEVATED PSA: ICD-10-CM

## 2021-04-02 DIAGNOSIS — R73.09 ELEVATED HEMOGLOBIN A1C: ICD-10-CM

## 2021-04-02 DIAGNOSIS — Z13.6 SCREENING FOR ISCHEMIC HEART DISEASE: ICD-10-CM

## 2021-04-02 DIAGNOSIS — Z00.00 WELCOME TO MEDICARE PREVENTIVE VISIT: Primary | ICD-10-CM

## 2021-04-02 DIAGNOSIS — R73.03 PREDIABETES: Chronic | ICD-10-CM

## 2021-04-02 DIAGNOSIS — E78.00 PURE HYPERCHOLESTEROLEMIA: Chronic | ICD-10-CM

## 2021-04-02 PROCEDURE — 1170F FXNL STATUS ASSESSED: CPT | Performed by: INTERNAL MEDICINE

## 2021-04-02 PROCEDURE — 1160F RVW MEDS BY RX/DR IN RCRD: CPT | Performed by: INTERNAL MEDICINE

## 2021-04-02 PROCEDURE — G0402 INITIAL PREVENTIVE EXAM: HCPCS | Performed by: INTERNAL MEDICINE

## 2021-04-02 PROCEDURE — 1125F AMNT PAIN NOTED PAIN PRSNT: CPT | Performed by: INTERNAL MEDICINE

## 2021-04-02 NOTE — ASSESSMENT & PLAN NOTE
Maintain a low sugar/starch/carbohydrate diet and exercise regularly. Check A1c level.     Lab Results   Component Value Date    HGBA1C 5.70 (H) 11/24/2020    HGBA1C 6.10 (H) 03/23/2020

## 2021-04-02 NOTE — PROGRESS NOTES
"    I N T E R N A L  M E D I C I N E  J U N O H  K I M,  M D      ENCOUNTER DATE:  04/02/2021    Luca Carolina Jr. / 65 y.o. / male        MEDICARE ANNUAL WELLNESS VISIT       Chief Complaint: Welcome To Medicare       Patient's general assessment of his health since a year ago:     - Compared to one year ago, he feels his physical health is slightly better.    - Compared to one year ago, he feels his mental health is about the same without significant change.      HPI for other active medical problems:     On atorvastatin 20 mg for hyperlipidemia without problems.   Elevated A1c: watching diet better, staying active.   On sertraline for mood irritability, doing okay.   He stopped ADHD medication in November. No significant worsening attention/focus.       * The required components of Health Risk Assessment (HRA) that were completed by the patient and/or my staff are contained within this note and in the scanned documents titled \"Health Risk Assessment\" within the media section of the patient's chart in [employer].         HISTORY       Recent Hospitalizations:    Recent hospitalization?:     If YES, location, date, and diagnoses:     · Location:   · Date:   · Principle Discharge Dx:   · Secondary Dx:       Patient Care Team:    Patient Care Team:  Christiano Dewey MD as PCP - General (Internal Medicine)      Allergies:  Patient has no known allergies.    Medications:  Current Outpatient Medications on File Prior to Visit   Medication Sig Dispense Refill   • atorvastatin (LIPITOR) 20 MG tablet TAKE 1 TABLET BY MOUTH DAILY 90 tablet 3   • Loratadine 10 MG capsule Take 1 tablet by mouth Daily.     • sertraline (ZOLOFT) 25 MG tablet TAKE 1 TABLET BY MOUTH DAILY 90 tablet 1   • Triamcinolone Acetonide (NASACORT) 55 MCG/ACT nasal inhaler 2 sprays into each nostril daily.     • [DISCONTINUED] meloxicam (MOBIC) 15 MG tablet Take 15 mg by mouth Daily.       No current facility-administered medications on file prior to visit.    "     PFSH:     The following portions of the patient's history were reviewed and updated as appropriate: Allergies / Current Medications / Past Medical History / Surgical History / Social History / Family History    Problem List:  Patient Active Problem List   Diagnosis   • Allergic rhinitis due to pollen   • Pure hypercholesterolemia   • Irritability   • Prediabetes   • BPH with elevated PSA       Past Medical History:  Past Medical History:   Diagnosis Date   • ADD (attention deficit disorder) 6/10/2016   • Allergic rhinitis due to pollen 6/10/2016   • 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    • HLD (hyperlipidemia) 6/10/2016       Past Surgical History:  Past Surgical History:   Procedure Laterality Date   • COLONOSCOPY  03/18/2009    LAURIE Maya   • COLONOSCOPY N/A 2009    Dr. Zacarias   • COLONOSCOPY N/A 3/22/2019    Procedure: COLONOSCOPY to cecum/TI;  Surgeon: Georges Zacarias Jr., MD;  Location: [facility] ENDOSCOPY;  Service: General   • ENDOSCOPY N/A 3/22/2019    Procedure: ESOPHAGOGASTRODUODENOSCOPY;  Surgeon: Georges Zacarias Jr., MD;  Location: [facility] ENDOSCOPY;  Service: General   • HEMORRHOIDECTOMY     • SINUS SURGERY     • TOTAL HIP ARTHROPLASTY Left 02/02/2021       Social History:  Social History     Socioeconomic History   • Marital status:      Spouse name: Stacie*   • Number of children: 2   • Years of education: Not on file   • Highest education level: Not on file   Tobacco Use   • Smoking status: Never Smoker   • Smokeless tobacco: Never Used   Substance and Sexual Activity   • Alcohol use: Yes     Alcohol/week: 5.0 standard drinks     Types: 5 Glasses of wine per week   • Drug use: No   • Sexual activity: Yes     Partners: Female       Family History:  Family History   Problem Relation Age of Onset   • Dementia Mother 90   • Aortic stenosis Father 87   • ADD / ADHD Daughter    • Anxiety disorder Son    • No Known Problems  "Sister    • No Known Problems Brother    • No Known Problems Sister    • Drug abuse Neg Hx    • Depression Neg Hx    • Alcohol abuse Neg Hx    • Colon cancer Neg Hx    • Prostate cancer Neg Hx          PATIENT ASSESSMENT     Vitals:  /68 (BP Location: Left arm)   Pulse 78   Temp 97 °F (36.1 °C)   Ht 182.9 cm (72\")   Wt 85.7 kg (189 lb)   SpO2 97%   BMI 25.63 kg/m²   BP Readings from Last 3 Encounters:   04/02/21 122/68   01/21/21 124/80   11/23/20 134/82     Wt Readings from Last 3 Encounters:   04/02/21 85.7 kg (189 lb)   01/21/21 92.4 kg (203 lb 12.8 oz)   11/23/20 91.6 kg (202 lb)      Body mass index is 25.63 kg/m².    There were no vitals filed for this visit.      Review of Systems:    Review of Systems   Constitutional: Negative.    HENT: Negative.    Eyes: Negative.    Respiratory: Negative.    Cardiovascular: Negative.    Gastrointestinal: Negative.    Endocrine: Negative.    Genitourinary: Negative.    Musculoskeletal: Negative.    Skin: Negative.    Allergic/Immunologic: Negative.    Neurological: Negative.    Hematological: Negative.    Psychiatric/Behavioral: Negative.  Agitation: stable.         Physical Exam:    Physical Exam  Constitutional:       General: He is not in acute distress.     Appearance: He is well-developed.   HENT:      Head: Normocephalic and atraumatic.      Right Ear: Tympanic membrane, ear canal and external ear normal.      Left Ear: Tympanic membrane, ear canal and external ear normal.   Eyes:      General: No scleral icterus.     Conjunctiva/sclera: Conjunctivae normal.      Pupils: Pupils are equal, round, and reactive to light.   Neck:      Thyroid: No thyroid mass or thyromegaly.      Trachea: No tracheal deviation.   Cardiovascular:      Rate and Rhythm: Normal rate and regular rhythm.      Heart sounds: Normal heart sounds.      Comments: No carotid bruit  Pulmonary:      Effort: Pulmonary effort is normal.      Breath sounds: Normal breath sounds.   Abdominal: "      General: There is no distension.      Palpations: Abdomen is soft. There is no mass.      Tenderness: There is no abdominal tenderness.      Hernia: No hernia is present.   Musculoskeletal:         General: Normal range of motion.      Cervical back: Neck supple.   Lymphadenopathy:      Cervical: No cervical adenopathy.      Upper Body:      Right upper body: No supraclavicular adenopathy.      Left upper body: No supraclavicular adenopathy.   Skin:     General: Skin is warm.      Coloration: Skin is not pale.      Findings: No lesion (Negative for suspicious skin lesions/growths) or rash.      Comments: No jaundice  No suspicious skin lesions.    Neurological:      Mental Status: He is alert and oriented to person, place, and time.      Cranial Nerves: No cranial nerve deficit.      Motor: No abnormal muscle tone.   Psychiatric:         Mood and Affect: Mood normal.         Behavior: Behavior normal.         Thought Content: Thought content normal.         Judgment: Judgment normal.           Reviewed Data:    Labs:   Lab Results   Component Value Date     11/24/2020    K 4.5 11/24/2020    CALCIUM 9.6 11/24/2020    AST 23 11/24/2020    ALT 30 11/24/2020    BUN 18 11/24/2020    CREATININE 0.77 11/24/2020    CREATININE 0.93 03/16/2020    CREATININE 0.96 11/08/2019    EGFRIFNONA 101 11/24/2020    EGFRIFAFRI 123 11/24/2020       Lab Results   Component Value Date    GLU 80 11/24/2020     (H) 03/16/2020    GLU 92 11/08/2019    HGBA1C 5.70 (H) 11/24/2020    HGBA1C 6.10 (H) 03/23/2020       Lab Results   Component Value Date    LDL 91 11/24/2020    LDL 93 03/16/2020     (H) 11/08/2019    HDL 70 (H) 11/24/2020    TRIG 146 11/24/2020    CHOLHDLRATIO 2.66 11/24/2020       Lab Results   Component Value Date    TSH 2.040 03/23/2020    FREET4 1.09 03/23/2020          Lab Results   Component Value Date    WBC 5.37 01/04/2021    HGB 14.3 01/04/2021    HGB 14.1 03/23/2020    HGB 14.1 01/02/2019      01/04/2021                   Lab Results   Component Value Date    PSA 2.240 09/18/2020    PSA 2.170 03/23/2020    PSA 1.930 01/02/2019       Imaging:          Medical Tests:      DATE OF EXAM: 01/04/2021  08:46   EXAMINATION(S): ECG 12-LEAD     FINAL REPORT     Procedure: ELECTROCARDIOGRAM RESULT   Heart Rate     70   P-R Interval     160 ms   QRS Interval     82 ms   QT Interval     356 ms   QTC Interval     385 ms   P Axis     36 deg   QRS Axis     -3 deg   T Wave Axis     34 deg   DATE: 01/04/2021 08:46   SINUS RHYTHM   Summary: Normal ECG       Screening for Glaucoma:  Previous screening for glaucoma?: Yes      Hearing Loss Screen:  Finger Rub Hearing Test (right ear): passed  Finger Rub Hearing Test (left ear): passed      Urinary Incontinence Screen:  Episodes of urinary incontinence? : No      Depression Screen:  PHQ-2/PHQ-9 Depression Screening 4/2/2021   Little interest or pleasure in doing things 0   Feeling down, depressed, or hopeless 0   Total Score 0        PHQ-2: 0 (Not depressed)    PHQ-9: 0 (Negative screening for depression)       FUNCTIONAL, FALL RISK, & COGNITIVE SCREENING (Components below):    DATA:    Functional & Cognitive Status 4/2/2021   Do you have difficulty preparing food and eating? No   Do you have difficulty bathing yourself, getting dressed or grooming yourself? No   Do you have difficulty using the toilet? No   Do you have difficulty moving around from place to place? No   Do you have trouble with steps or getting out of a bed or a chair? No   Current Diet Well Balanced Diet   Dental Exam Up to date   Eye Exam Up to date   Exercise (times per week) 7 times per week   Current Exercise Activities Include Cardiovasular Workout on Exercise Equipment   Do you need help using the phone?  No   Are you deaf or do you have serious difficulty hearing?  No   Do you need help with transportation? No   Do you need help shopping? No   Do you need help preparing meals?  No   Do you need help  with housework?  No   Do you need help with laundry? No   Do you need help taking your medications? No   Do you need help managing money? No   Do you ever drive or ride in a car without wearing a seat belt? No   Do you have difficulty concentrating, remembering or making decisions? No         A) Assessment of Functional Ability:  (Assessment of ability to perform ADL's (showering/bathing, using toilet, dressing, feeding self, moving self around) and IADL's (use telephone, shop, prepare food, housekeep, do laundry, transport independently, take medications independently, and handle finances)    Degree of functional impairment: NONE (based on assessment noted above)      B) Assessment of Fall Risk:  Fall Risk Assessment was completed, and patient is at low risk for falls.       Need for further evaluation of gait, strength, and balance? : No    Timed Up and Go (TUG):   (>= 12 seconds indicates high risk for falling)    Observable abnormalities included: Normal gait pattern       C. Assessment of Cognitive Function:    Mini-Cog Test:     1) Registration (5 objects): Yes   2) Number of objects recalled: 5   3) Clock Draw: Passed? : N/A       Further evaluation required? : No        COUNSELING       A. Identification of Health Risk Factors:    Risk factors include: cardiovascular risk factors and depression / other psychiatric problems      B. Age-Appropriate Screening Schedule:  (Refer to the list below for future screening recommendations based on patient's age, sex and/or medical conditions. Orders for these recommended tests are listed in the plan section. The patient has been provided with a written plan)    Health Maintenance Topics  Health Maintenance   Topic Date Due   • ZOSTER VACCINE (3 of 3) 06/29/2020   • INFLUENZA VACCINE  08/01/2021   • PROSTATE CANCER SCREENING  09/18/2021   • LIPID PANEL  11/24/2021   • COLONOSCOPY  03/22/2024   • TDAP/TD VACCINES (3 - Td) 01/08/2029       Health Maintenance Topics Due or  Over-Due  Health Maintenance Due   Topic Date Due   • ZOSTER VACCINE (3 of 3) 06/29/2020   • Pneumococcal Vaccine 65+ (1 of 1 - PPSV23) Never done         C. Advanced Care Planning:    Advance Care Planning   ACP discussion was held with the patient during this visit. Patient has an advance directive (not in EMR), copy requested.       D. Patient Self-Management and Personalized Health Advice:    He has been provided with personalized counseling/information (including brochures/handouts) about:     -- reducing risk for cardiovascular disease (heart, stroke, vascular), mental health concerns (depression/anxiety) and managing depression/anxiety      He has been recommended for the following preventative services which has been performed today, will be ordered today or ordered/performed on upcoming follow-up visit:     -- alcohol use counseling completed, nutrition counseling provided, exercise counseling provided, counseling for cardiovascular disease risk reduction, fall risk assessment / plan of care completed, urinary incontinence assessment done, screening for diabetes performed (current/reviewed labs/lab ordered), triple vessel screening recommended (including AAA screening), screening for ischemic heart disease, **screening for prostate cancer (discussed and PSA will be performed annually), vaccination for Pneumovax administered (or recommended at pharmacy), vaccination for Shingrix administered  (or recommended at pharmacy), **vaccination recommendations provided for COVID-19      E. Miscellaneous Items:    -Aspirin use counseling: Does not need ASA (and currently is not on it)    -Discussed BMI with him. The BMI is in the acceptable range    -Reviewed use of high risk medication in the elderly: YES    -Reviewed for potential of harmful drug interactions in the elderly: YES        WRAP UP       Assessment & Plan:    1) MEDICARE ANNUAL WELLNESS VISIT    2) OTHER MEDICAL CONDITIONS ADDRESSED TODAY:             Problem List Items Addressed This Visit        High    Pure hypercholesterolemia (Chronic)    Overview     Continue atorvastatin 20 mg qd          Relevant Medications    atorvastatin (LIPITOR) 20 MG tablet       Medium    RESOLVED: Attention deficit disorder (ADD) without hyperactivity (Chronic)    Overview     Neuropsychological evaluation ~2007.          Current Assessment & Plan     He decided to stop medication around November 2020.          Relevant Medications    sertraline (ZOLOFT) 25 MG tablet    Irritability (Chronic)    Current Assessment & Plan     He came off Adderall XR in November. He wants to stay on sertraline 25 mg qd for now.          Relevant Medications    sertraline (ZOLOFT) 25 MG tablet    Prediabetes (Chronic)    Current Assessment & Plan     Maintain a low sugar/starch/carbohydrate diet and exercise regularly. Check A1c level.     Lab Results   Component Value Date    HGBA1C 5.70 (H) 11/24/2020    HGBA1C 6.10 (H) 03/23/2020                Low    BPH with elevated PSA    Current Assessment & Plan     Recheck PSA today.   Lab Results   Component Value Date    PSA 2.240 09/18/2020    PSA 2.170 03/23/2020    PSA 1.930 01/02/2019             Relevant Orders    PSA DIAGNOSTIC      Other Visit Diagnoses     Welcome to Medicare preventive visit    -  Primary    Elevated hemoglobin A1c        Relevant Orders    Hemoglobin A1c    Screening for ischemic heart disease        Relevant Orders    CT Cardiac Calcium Score Without Dye                    Orders Placed This Encounter   Procedures   • CT Cardiac Calcium Score Without Dye   • Hemoglobin A1c   • PSA DIAGNOSTIC       Discussion / Summary:        Medications as of TODAY:              Current Outpatient Medications   Medication Sig Dispense Refill   • atorvastatin (LIPITOR) 20 MG tablet TAKE 1 TABLET BY MOUTH DAILY 90 tablet 3   • Loratadine 10 MG capsule Take 1 tablet by mouth Daily.     • sertraline (ZOLOFT) 25 MG tablet TAKE 1 TABLET BY MOUTH  DAILY 90 tablet 1   • Triamcinolone Acetonide (NASACORT) 55 MCG/ACT nasal inhaler 2 sprays into each nostril daily.       No current facility-administered medications for this visit.         FOLLOW-UP:            Return in about 6 months (around 10/2/2021) for Reassess chronic medical problems.                 Future Appointments   Date Time Provider Department Center   10/8/2021 10:15 AM Christiano Dewey MD [facility]           After Visit Summary (AVS) including the Personalized Prevention  Plan Services (PPPS) was either printed and given to the patient at check-out today and/or sent to [facility] for review.       *Examiner was wearing KN95 mask, face shield and exam gloves during the entire duration of the visit. Patient was masked the entire time.   Minimum social distance of 6 ft maintained entire visit except if physical contact was necessary as documented.      **Dragon Disclaimer:   Much of this encounter note is an electronic transcription/translation of spoken language to printed text. The electronic translation of spoken language may permit erroneous, or at times, nonsensical words or phrases to be inadvertently transcribed. Although I have reviewed the note for such errors, some may still exist.     This template was created by Tor Dewey MD.

## 2021-04-02 NOTE — ASSESSMENT & PLAN NOTE
Recheck PSA today.   Lab Results   Component Value Date    PSA 2.240 09/18/2020    PSA 2.170 03/23/2020    PSA 1.930 01/02/2019

## 2021-04-03 LAB
HBA1C MFR BLD: 5.1 % (ref 4.8–5.6)
PSA SERPL-MCNC: 2.24 NG/ML (ref 0–4)

## 2021-04-28 ENCOUNTER — HOSPITAL ENCOUNTER (OUTPATIENT)
Dept: CT IMAGING | Facility: HOSPITAL | Age: 66
Discharge: HOME | End: 2021-04-28
Admitting: INTERNAL MEDICINE

## 2021-04-28 DIAGNOSIS — Z13.6 SCREENING FOR ISCHEMIC HEART DISEASE: ICD-10-CM

## 2021-04-28 PROCEDURE — 75571 CT HRT W/O DYE W/CA TEST: CPT

## 2021-04-30 DIAGNOSIS — E78.00 PURE HYPERCHOLESTEROLEMIA: Primary | ICD-10-CM

## 2021-04-30 RX ORDER — ASPIRIN 81 MG/1
81 TABLET ORAL DAILY
COMMUNITY
End: 2021-06-17 | Stop reason: SDUPTHER

## 2021-04-30 RX ORDER — ATORVASTATIN CALCIUM 40 MG/1
40 TABLET, FILM COATED ORAL DAILY
Start: 2021-04-30 | End: 2021-06-17 | Stop reason: SDUPTHER

## 2021-05-13 DIAGNOSIS — R45.4 IRRITABILITY: ICD-10-CM

## 2021-05-13 RX ORDER — SERTRALINE HYDROCHLORIDE 25 MG/1
25 TABLET, FILM COATED ORAL DAILY
Qty: 90 TABLET | Refills: 1 | Status: SHIPPED | OUTPATIENT
Start: 2021-05-13 | End: 2021-06-17 | Stop reason: SDUPTHER

## 2021-06-17 ENCOUNTER — OFFICE VISIT (OUTPATIENT)
Dept: INTERNAL MEDICINE | Age: 66
End: 2021-06-17

## 2021-06-17 VITALS
SYSTOLIC BLOOD PRESSURE: 120 MMHG | HEIGHT: 72 IN | DIASTOLIC BLOOD PRESSURE: 47 MMHG | TEMPERATURE: 97.7 F | OXYGEN SATURATION: 98 % | WEIGHT: 199 LBS | HEART RATE: 67 BPM | BODY MASS INDEX: 26.95 KG/M2

## 2021-06-17 DIAGNOSIS — I25.10 CORONARY ARTERY CALCIFICATION SEEN ON CT SCAN: ICD-10-CM

## 2021-06-17 DIAGNOSIS — E78.00 PURE HYPERCHOLESTEROLEMIA: Chronic | ICD-10-CM

## 2021-06-17 DIAGNOSIS — R45.4 IRRITABILITY: Primary | ICD-10-CM

## 2021-06-17 PROCEDURE — 99214 OFFICE O/P EST MOD 30 MIN: CPT | Performed by: INTERNAL MEDICINE

## 2021-06-17 RX ORDER — ATORVASTATIN CALCIUM 40 MG/1
40 TABLET, FILM COATED ORAL DAILY
COMMUNITY
Start: 2021-06-17 | End: 2021-07-19 | Stop reason: SDUPTHER

## 2021-06-17 RX ORDER — ASPIRIN 81 MG/1
81 TABLET ORAL DAILY
COMMUNITY
Start: 2021-06-17

## 2021-06-17 RX ORDER — SERTRALINE HYDROCHLORIDE 25 MG/1
25 TABLET, FILM COATED ORAL DAILY
Qty: 90 TABLET | Refills: 1 | Status: SHIPPED | OUTPATIENT
Start: 2021-06-17 | End: 2021-06-17

## 2021-06-17 NOTE — ASSESSMENT & PLAN NOTE
Recommended increasing sertraline to 50 mg qd.   Advised against restarting Adderall at this time.   He is in agreement.   Follow-up in 3 months.

## 2021-06-17 NOTE — ASSESSMENT & PLAN NOTE
Previously increased atorvastatin to 40 mg qd (based on findings from CT coronary calcium score) and ASA 81 mg qd.

## 2021-06-17 NOTE — PROGRESS NOTES
"    I N T E R N A L  M E D I C I N E  J U N O H  K I M,  M D      ENCOUNTER DATE:  06/17/2021    Luca Carolina . / 65 y.o. / male      CHIEF COMPLAINT / REASON FOR OFFICE VISIT     ADHD medication (need to go back on adderall )      ASSESSMENT & PLAN     Problem List Items Addressed This Visit        High    Pure hypercholesterolemia (Chronic)    Current Assessment & Plan     Previously increased atorvastatin to 40 mg qd (based on findings from CT coronary calcium score) and ASA 81 mg qd.          Relevant Medications    atorvastatin (LIPITOR) 40 MG tablet    Irritability - Primary (Chronic)    Current Assessment & Plan     Recommended increasing sertraline to 50 mg qd.   Advised against restarting Adderall at this time.   He is in agreement.   Follow-up in 3 months.          Relevant Medications    sertraline (ZOLOFT) 50 MG tablet       Medium    Coronary artery calcification seen on CT scan (Chronic)    Overview     4/2021 : noted CT coronary calcium score (extensive atherosclerotic burden of RCA and LAD)         Current Assessment & Plan     No angina.  Previously started on ASA 81 mg qd and increased atorvastatin to 40 mg qd.          Relevant Medications    aspirin 81 MG EC tablet    atorvastatin (LIPITOR) 40 MG tablet        No orders of the defined types were placed in this encounter.    New Medications Ordered This Visit   Medications   • sertraline (ZOLOFT) 50 MG tablet     Sig: Take 1 tablet by mouth Daily.     Dispense:  90 tablet     Refill:  1       SUMMARY/DISCUSSION  •       Next Appointment with me: 9/20/2021    Return in about 3 months (around 9/17/2021) for Reassess today's problem(s).      VITAL SIGNS     Visit Vitals  /47 (BP Location: Left arm)   Pulse 67   Temp 97.7 °F (36.5 °C)   Ht 182.9 cm (72\")   Wt 90.3 kg (199 lb)   SpO2 98%   BMI 26.99 kg/m²       BP Readings from Last 3 Encounters:   06/17/21 120/47   06/02/21 128/85   04/02/21 122/68     Wt Readings from Last 3 Encounters: "   06/17/21 90.3 kg (199 lb)   06/02/21 86.2 kg (190 lb)   04/02/21 85.7 kg (189 lb)     Body mass index is 26.99 kg/m².      MEDICATIONS AT THE TIME OF OFFICE VISIT     Current Outpatient Medications on File Prior to Visit   Medication Sig   • aspirin 81 MG EC tablet Take 1 tablet by mouth Daily.   • atorvastatin (LIPITOR) 40 MG tablet Take 1 tablet by mouth Daily.   • Loratadine 10 MG capsule Take 1 tablet by mouth Daily.   • Triamcinolone Acetonide (NASACORT) 55 MCG/ACT nasal inhaler 2 sprays into each nostril daily.   • aspirin 81 MG EC tablet Take 81 mg by mouth Daily.   • atorvastatin (LIPITOR) 40 MG tablet Take 1 tablet by mouth Daily.   • sertraline (ZOLOFT) 25 MG tablet TAKE 1 TABLET BY MOUTH DAILY     No current facility-administered medications on file prior to visit.          HISTORY OF PRESENT ILLNESS     He went off Adderall 11/2020 and did fine for 2 months. Stayed on sertraline 25 mg qd for irritability. Complains of increased irritable symptoms over last several months and his family members have suggested he get back on Adderall. No depression symptoms. Feels he is not as focused with his work but no real impact on end result at work. In April CT coronary calcium score showed extensive coronary atherosclerotic burden without angina or CHAMBERS. Started ASA 81 mg and increased atorvastatin to 40 mg.       Patient Care Team:  Christiano Dewey MD as PCP - General (Internal Medicine)    REVIEW OF SYSTEMS     Review of Systems   Wt gain  No angina or CHAMBERS   Not depressed, more irritable and decreased patience    PHYSICAL EXAMINATION     Physical Exam  Psych: Normal mood and affect. Alert and intact judgment.       REVIEWED DATA     Labs:     Lab Results   Component Value Date     11/24/2020    K 4.5 11/24/2020    CALCIUM 9.6 11/24/2020    AST 23 11/24/2020    ALT 30 11/24/2020    BUN 18 11/24/2020    CREATININE 0.77 11/24/2020    CREATININE 0.93 03/16/2020    CREATININE 0.96 11/08/2019    EGFRIFNONA 101  11/24/2020    EGFRIFAFRI 123 11/24/2020       Lab Results   Component Value Date    HGBA1C 5.10 04/02/2021    HGBA1C 5.70 (H) 11/24/2020    HGBA1C 6.10 (H) 03/23/2020       Lab Results   Component Value Date    LDL 91 11/24/2020    LDL 93 03/16/2020    HDL 70 (H) 11/24/2020    TRIG 146 11/24/2020       Lab Results   Component Value Date    TSH 2.040 03/23/2020    TSH 3.200 11/08/2019    FREET4 1.09 03/23/2020    FREET4 1.04 11/08/2019       Lab Results   Component Value Date    WBC 5.37 01/04/2021    HGB 14.3 01/04/2021     01/04/2021         Imaging:     CT Cardiac Calcium Score Without Dye 04/28/2021    Narrative  CORONARY ARTERY CALCIUM SCORING    HISTORY:  Screening    TECHNIQUE: Radiation dose reduction techniques were utilized, including  automated exposure control and exposure modulation based on body size.  Regions of interest delineated all areas of coronary artery  calcification on an ECG-gated noncontrasted cardiac CT scan.    FINDINGS: These regions were quantitatively scored for calcium as  follows:    Score 1: LM -- Calcium Score: 1, Volume(mm3): 4    Score 2: RCA -- Calcium Score: 320, Volume(mm3): 273    Score 3: LAD -- Calcium Score: 204, Volume(mm3): 183    Score 4: CX -- Calcium Score: 0, Volume(mm3): 1    Score 5: PDA -- Calcium Score: ---, Volume(mm3): ---    Score 6: Other -- Calcium Score: 33, Volume(mm3): 30    Total: Calcium Score: 558, Volume(mm3): 491    Impression  Total coronary artery Agatston calcification score is 558.    These findings represent an extensive identifiable coronary  atherosclerotic burden.    A score of < 10 represents only a minimal coronary atherosclerotic  burden with a negative predictive value of 90-95%.    A score of  represents a mild coronary atherosclerotic burden with  a relative risk of death of 1.6 as compared to individuals with a score  of < 10.  The risk is higher if this score is > 75% for this age group  or if there is > 1 calcified  vessel.    A score of 101-400 represents a moderate coronary atherosclerotic burden  with a relative risk of death of 1.7 as compared to individuals with a  score of < 10.  The risk is higher if this score is > 75% for this age  group or if there is > 1 calcified vessel.    A score of 401-1000 represents a large coronary atherosclerotic burden  with a relative risk of death of 2.5 as compared to individuals with a  score of < 10.  The risk is higher if this score is > 75% for this age  group or if there is > 1 calcified vessel.    A score of > 1000 represents an extensive coronary atherosclerotic  burden with a relative risk of death of 4 as compared to individuals  with a score of < 10.  The risk is higher if this score is > 75% for  this age group or if there is > 1 calcified vessel.      This report was finalized on 4/29/2021 9:37 PM by Dr. Sha Mcneill M.D.         Medical Tests:           Summary of old records / correspondence / consultant report:           Request outside records:             *Examiner was wearing KN95 mask during the entire duration of the visit. Patient was masked the entire time.   Minimum social distance of 6 ft maintained entire visit except if physical contact was necessary as documented.     **Dragon Disclaimer:   Much of this encounter note is an electronic transcription/translation of spoken language to printed text. The electronic translation of spoken language may permit erroneous, or at times, nonsensical words or phrases to be inadvertently transcribed. Although I have reviewed the note for such errors, some may still exist.     Template created by Tor Dewey MD

## 2021-06-30 ENCOUNTER — TRANSCRIBE ORDERS (OUTPATIENT)
Dept: ADMINISTRATIVE | Facility: HOSPITAL | Age: 66
End: 2021-06-30

## 2021-06-30 DIAGNOSIS — Z13.6 ENCOUNTER FOR SCREENING FOR VASCULAR DISEASE: Primary | ICD-10-CM

## 2021-07-19 DIAGNOSIS — I25.10 CORONARY ARTERY CALCIFICATION SEEN ON CT SCAN: ICD-10-CM

## 2021-07-19 DIAGNOSIS — E78.00 PURE HYPERCHOLESTEROLEMIA: Chronic | ICD-10-CM

## 2021-07-19 RX ORDER — ATORVASTATIN CALCIUM 40 MG/1
40 TABLET, FILM COATED ORAL DAILY
Qty: 90 TABLET | Refills: 3 | Status: SHIPPED | OUTPATIENT
Start: 2021-07-19 | End: 2021-12-23 | Stop reason: SDUPTHER

## 2021-10-08 ENCOUNTER — IMMUNIZATION (OUTPATIENT)
Dept: VACCINE CLINIC | Facility: HOSPITAL | Age: 66
End: 2021-10-08

## 2021-10-08 ENCOUNTER — OFFICE VISIT (OUTPATIENT)
Dept: INTERNAL MEDICINE | Age: 66
End: 2021-10-08

## 2021-10-08 VITALS
DIASTOLIC BLOOD PRESSURE: 76 MMHG | SYSTOLIC BLOOD PRESSURE: 120 MMHG | OXYGEN SATURATION: 98 % | TEMPERATURE: 97.3 F | HEIGHT: 65 IN | WEIGHT: 199 LBS | HEART RATE: 60 BPM | BODY MASS INDEX: 33.15 KG/M2

## 2021-10-08 DIAGNOSIS — I25.10 CORONARY ARTERY CALCIFICATION SEEN ON CT SCAN: Chronic | ICD-10-CM

## 2021-10-08 DIAGNOSIS — E78.00 PURE HYPERCHOLESTEROLEMIA: Primary | Chronic | ICD-10-CM

## 2021-10-08 DIAGNOSIS — R45.4 IRRITABILITY: ICD-10-CM

## 2021-10-08 DIAGNOSIS — R73.03 PREDIABETES: Chronic | ICD-10-CM

## 2021-10-08 LAB
ALBUMIN SERPL-MCNC: 5.3 G/DL (ref 3.5–5.2)
ALBUMIN/GLOB SERPL: 3.1 G/DL
ALP SERPL-CCNC: 61 U/L (ref 39–117)
ALT SERPL-CCNC: 26 U/L (ref 1–41)
AST SERPL-CCNC: 26 U/L (ref 1–40)
BILIRUB SERPL-MCNC: 0.5 MG/DL (ref 0–1.2)
BUN SERPL-MCNC: 18 MG/DL (ref 8–23)
BUN/CREAT SERPL: 20.9 (ref 7–25)
CALCIUM SERPL-MCNC: 9.9 MG/DL (ref 8.6–10.5)
CHLORIDE SERPL-SCNC: 103 MMOL/L (ref 98–107)
CHOLEST SERPL-MCNC: 179 MG/DL (ref 0–200)
CHOLEST/HDLC SERPL: 2.93 {RATIO}
CO2 SERPL-SCNC: 28.7 MMOL/L (ref 22–29)
CREAT SERPL-MCNC: 0.86 MG/DL (ref 0.76–1.27)
GLOBULIN SER CALC-MCNC: 1.7 GM/DL
GLUCOSE SERPL-MCNC: 93 MG/DL (ref 65–99)
HBA1C MFR BLD: 5.8 % (ref 4.8–5.6)
HDLC SERPL-MCNC: 61 MG/DL (ref 40–60)
LDLC SERPL CALC-MCNC: 97 MG/DL (ref 0–100)
POTASSIUM SERPL-SCNC: 5.2 MMOL/L (ref 3.5–5.2)
PROT SERPL-MCNC: 7 G/DL (ref 6–8.5)
SODIUM SERPL-SCNC: 142 MMOL/L (ref 136–145)
TRIGL SERPL-MCNC: 121 MG/DL (ref 0–150)
VLDLC SERPL CALC-MCNC: 21 MG/DL (ref 5–40)

## 2021-10-08 PROCEDURE — 0004A ADM SARSCOV2 30MCG/0.3ML BOOSTER: CPT | Performed by: INTERNAL MEDICINE

## 2021-10-08 PROCEDURE — 91300 HC SARSCOV02 VAC 30MCG/0.3ML IM: CPT | Performed by: INTERNAL MEDICINE

## 2021-10-08 PROCEDURE — 0003A: CPT | Performed by: INTERNAL MEDICINE

## 2021-10-08 PROCEDURE — 99214 OFFICE O/P EST MOD 30 MIN: CPT | Performed by: INTERNAL MEDICINE

## 2021-10-08 NOTE — PROGRESS NOTES
I N T E R N A L  M E D I C I N E  J U N O H  K I M,  M D      ENCOUNTER DATE:  10/08/2021    Luca Carolina . / 65 y.o. / male      CHIEF COMPLAINT / REASON FOR OFFICE VISIT     Hyperlipidemia, Prediabetes, and Irritability      ASSESSMENT & PLAN     Problem List Items Addressed This Visit        High    Pure hypercholesterolemia - Primary (Chronic)    Overview     Continue atorvastatin 40 mg.          Relevant Medications    atorvastatin (LIPITOR) 40 MG tablet    Other Relevant Orders    Comprehensive Metabolic Panel    Lipid Panel With / Chol / HDL Ratio       Medium    Irritability (Chronic)    Current Assessment & Plan     Advised to take sertraline 50 mg everyday.         Relevant Medications    sertraline (ZOLOFT) 50 MG tablet    Prediabetes (Chronic)    Overview     Maintain a low sugar/starch/carbohydrate diet and exercise regularly. Wt loss advised.           Current Assessment & Plan     Lab Results   Component Value Date    GLU 80 11/24/2020     (H) 03/16/2020    GLU 92 11/08/2019     Lab Results   Component Value Date    HGBA1C 5.10 04/02/2021    HGBA1C 5.70 (H) 11/24/2020    HGBA1C 6.10 (H) 03/23/2020             Relevant Orders    Hemoglobin A1c    Coronary artery calcification seen on CT scan (Chronic)    Overview     4/2021 : noted CT coronary calcium score (extensive atherosclerotic burden of RCA and LAD)    Continue ASA 81 mg qd and atorvastatin 40 mg qd.          Relevant Medications    aspirin 81 MG EC tablet    atorvastatin (LIPITOR) 40 MG tablet        Orders Placed This Encounter   Procedures   • Comprehensive Metabolic Panel   • Lipid Panel With / Chol / HDL Ratio   • Hemoglobin A1c     New Medications Ordered This Visit   Medications   • sertraline (ZOLOFT) 50 MG tablet     Sig: Take 1 tablet by mouth Daily.       SUMMARY/DISCUSSION  •       Next Appointment with me: Visit date not found    Return in about 6 months (around 4/8/2022) for Reassess chronic medical  "problems.      VITAL SIGNS     Visit Vitals  /76 (BP Location: Left arm)   Pulse 60   Temp 97.3 °F (36.3 °C)   Ht 165.1 cm (65\")   Wt 90.3 kg (199 lb)   SpO2 98%   BMI 33.12 kg/m²       BP Readings from Last 3 Encounters:   10/08/21 120/76   06/17/21 120/47   06/02/21 128/85     Wt Readings from Last 3 Encounters:   10/08/21 90.3 kg (199 lb)   06/17/21 90.3 kg (199 lb)   06/02/21 86.2 kg (190 lb)     Body mass index is 33.12 kg/m².      MEDICATIONS AT THE TIME OF OFFICE VISIT     Current Outpatient Medications on File Prior to Visit   Medication Sig   • aspirin 81 MG EC tablet Take 1 tablet by mouth Daily.   • atorvastatin (LIPITOR) 40 MG tablet Take 1 tablet by mouth Daily.   • Loratadine 10 MG capsule Take 1 tablet by mouth Daily.   • Triamcinolone Acetonide (NASACORT) 55 MCG/ACT nasal inhaler 2 sprays into each nostril daily.   • sertraline (ZOLOFT) 50 MG tablet Takes 1/2 tab daily and occasionally 1 tablet by mouth Daily.     No current facility-administered medications on file prior to visit.          HISTORY OF PRESENT ILLNESS     Previously cardiac CT calcium score test showed significant coronary calcification.  Denies angina or dyspnea on exertion.  He is on aspirin 81 mg daily and atorvastatin was previously increased to 40 mg without significant side effects.  In terms of irritability he has not been taking sertraline 50 mg every day rather he has been taking 1/2 tablet daily and then intermittently takes a whole tablet when he feels he needs it.  He has history of prediabetes with improving A1c.      Patient Care Team:  Christiano Dewey MD as PCP - General (Internal Medicine)    REVIEW OF SYSTEMS     Review of Systems       PHYSICAL EXAMINATION     Physical Exam  General: No acute distress  Psych: Normal thought and judgment   Cardiovascular Rate: normal. Rhythm: regular. Heart sounds: normal.   No carotid bruit.        REVIEWED DATA     Labs:     Lab Results   Component Value Date     11/24/2020 "    K 4.5 11/24/2020    CALCIUM 9.6 11/24/2020    AST 23 11/24/2020    ALT 30 11/24/2020    BUN 18 11/24/2020    CREATININE 0.77 11/24/2020    CREATININE 0.93 03/16/2020    CREATININE 0.96 11/08/2019    EGFRIFNONA 101 11/24/2020    EGFRIFAFRI 123 11/24/2020       Lab Results   Component Value Date    HGBA1C 5.10 04/02/2021    HGBA1C 5.70 (H) 11/24/2020    HGBA1C 6.10 (H) 03/23/2020       Lab Results   Component Value Date    LDL 91 11/24/2020    LDL 93 03/16/2020    HDL 70 (H) 11/24/2020    TRIG 146 11/24/2020       Lab Results   Component Value Date    TSH 2.040 03/23/2020    TSH 3.200 11/08/2019    FREET4 1.09 03/23/2020    FREET4 1.04 11/08/2019       Lab Results   Component Value Date    WBC 5.37 01/04/2021    HGB 14.3 01/04/2021     01/04/2021       No results found for: MICROALBUR        Imaging:           Medical Tests:           Summary of old records / correspondence / consultant report:           Request outside records:             *Examiner was wearing KN95 mask and eye protection during the entire duration of the visit. Patient was masked the entire time. Minimum social distance of 6 ft maintained entire visit except if physical contact was necessary as documented.     **Dragon Disclaimer: Much of this encounter note is an electronic transcription/translation of spoken language to printed text. The electronic translation of spoken language may permit erroneous, or at times, nonsensical words or phrases to be inadvertently transcribed. Although I have reviewed the note for such errors, some may still exist.       Template created by Tor Dewey MD

## 2021-10-08 NOTE — ASSESSMENT & PLAN NOTE
Lab Results   Component Value Date    GLU 80 11/24/2020     (H) 03/16/2020    GLU 92 11/08/2019     Lab Results   Component Value Date    HGBA1C 5.10 04/02/2021    HGBA1C 5.70 (H) 11/24/2020    HGBA1C 6.10 (H) 03/23/2020

## 2021-10-11 NOTE — PROGRESS NOTES
Yaquelint:    Luca, here are the result(s) of your test(s):     Overall stable labs:    Cholesterol level is overall stable  A1c for average glucose level is higher. Maintain a low sugar/starch/carbohydrate diet and exercise regularly. Wt loss advised.      Please do not hesitate to contact me if you have questions.

## 2021-11-08 ENCOUNTER — HOSPITAL ENCOUNTER (OUTPATIENT)
Dept: CARDIOLOGY | Facility: HOSPITAL | Age: 66
Discharge: HOME | End: 2021-11-08
Admitting: SURGERY

## 2021-11-08 VITALS
SYSTOLIC BLOOD PRESSURE: 127 MMHG | DIASTOLIC BLOOD PRESSURE: 81 MMHG | HEART RATE: 62 BPM | WEIGHT: 203 LBS | HEIGHT: 71 IN | BODY MASS INDEX: 28.42 KG/M2

## 2021-11-08 DIAGNOSIS — Z13.6 ENCOUNTER FOR SCREENING FOR VASCULAR DISEASE: ICD-10-CM

## 2021-11-08 LAB
BH CV ECHO MEAS - DIST AO DIAM: 1.66 CM
BH CV VAS BP LEFT ARM: NORMAL MMHG
BH CV VAS BP RIGHT ARM: NORMAL MMHG
BH CV XLRA MEAS - MID AO DIAM: 1.94 CM
BH CV XLRA MEAS - PAD LEFT ABI DP: NORMAL
BH CV XLRA MEAS - PAD LEFT ABI PT: 1.24
BH CV XLRA MEAS - PAD LEFT ARM: 123 MMHG
BH CV XLRA MEAS - PAD LEFT LEG DP: 152 MMHG
BH CV XLRA MEAS - PAD LEFT LEG PT: 157 MMHG
BH CV XLRA MEAS - PAD RIGHT ABI DP: 1.18
BH CV XLRA MEAS - PAD RIGHT ABI PT: 1.28
BH CV XLRA MEAS - PAD RIGHT ARM: 127 MMHG
BH CV XLRA MEAS - PAD RIGHT LEG DP: 150 MMHG
BH CV XLRA MEAS - PAD RIGHT LEG PT: 163 MMHG
BH CV XLRA MEAS - PROX AO DIAM: 2.19 CM
BH CV XLRA MEAS LEFT ICA/CCA RATIO: 1.11
BH CV XLRA MEAS LEFT MID CCA PSV: NORMAL CM/SEC
BH CV XLRA MEAS LEFT MID ICA PSV: NORMAL CM/SEC
BH CV XLRA MEAS LEFT PROX ECA PSV: NORMAL CM/SEC
BH CV XLRA MEAS RIGHT ICA/CCA RATIO: 0.95
BH CV XLRA MEAS RIGHT MID CCA PSV: NORMAL CM/SEC
BH CV XLRA MEAS RIGHT MID ICA PSV: NORMAL CM/SEC
BH CV XLRA MEAS RIGHT PROX ECA PSV: NORMAL CM/SEC
MAXIMAL PREDICTED HEART RATE: 154 BPM
STRESS TARGET HR: 131 BPM

## 2021-11-08 PROCEDURE — 93799 UNLISTED CV SVC/PROCEDURE: CPT

## 2021-11-08 NOTE — PROGRESS NOTES
Pal:    Luca, here are the result(s) of your test(s):     Vascular studies show bilateral carotid plaque without clinically significant stenosis.   No abdominal aortic aneurysm.   No peripheral artery disease of bilateral lower extremities.     Please do not hesitate to contact me if you have questions.

## 2021-12-19 DIAGNOSIS — R45.4 IRRITABILITY: ICD-10-CM

## 2021-12-20 DIAGNOSIS — J20.9 ACUTE BRONCHITIS, UNSPECIFIED ORGANISM: Primary | ICD-10-CM

## 2021-12-20 RX ORDER — DOXYCYCLINE HYCLATE 100 MG
100 TABLET ORAL 2 TIMES DAILY
Qty: 14 TABLET | Refills: 0 | Status: SHIPPED | OUTPATIENT
Start: 2021-12-20 | End: 2021-12-27

## 2021-12-20 RX ORDER — SERTRALINE HYDROCHLORIDE 25 MG/1
25 TABLET, FILM COATED ORAL DAILY
Qty: 90 TABLET | Refills: 1 | OUTPATIENT
Start: 2021-12-20

## 2021-12-23 DIAGNOSIS — I25.10 CORONARY ARTERY CALCIFICATION SEEN ON CT SCAN: ICD-10-CM

## 2021-12-23 DIAGNOSIS — E78.00 PURE HYPERCHOLESTEROLEMIA: Chronic | ICD-10-CM

## 2021-12-23 DIAGNOSIS — R45.4 IRRITABILITY: ICD-10-CM

## 2021-12-23 RX ORDER — ATORVASTATIN CALCIUM 40 MG/1
40 TABLET, FILM COATED ORAL DAILY
Qty: 90 TABLET | Refills: 0 | Status: SHIPPED | OUTPATIENT
Start: 2021-12-23 | End: 2022-04-19 | Stop reason: SDUPTHER

## 2021-12-23 NOTE — TELEPHONE ENCOUNTER
Pt switched pharmacy   Due to insurance chnge   Requesting these rx to go [facility]    lov 10/8/21  Nov 4/8/22

## 2022-04-08 ENCOUNTER — OFFICE VISIT (OUTPATIENT)
Dept: INTERNAL MEDICINE | Age: 67
End: 2022-04-08

## 2022-04-08 VITALS
SYSTOLIC BLOOD PRESSURE: 118 MMHG | BODY MASS INDEX: 28.71 KG/M2 | TEMPERATURE: 97.4 F | DIASTOLIC BLOOD PRESSURE: 76 MMHG | OXYGEN SATURATION: 98 % | HEART RATE: 78 BPM | HEIGHT: 72 IN | WEIGHT: 212 LBS

## 2022-04-08 DIAGNOSIS — R73.03 PREDIABETES: ICD-10-CM

## 2022-04-08 DIAGNOSIS — R29.818 SUSPECTED SLEEP APNEA: ICD-10-CM

## 2022-04-08 DIAGNOSIS — R97.20 BPH WITH ELEVATED PSA: ICD-10-CM

## 2022-04-08 DIAGNOSIS — N40.0 BPH WITH ELEVATED PSA: ICD-10-CM

## 2022-04-08 DIAGNOSIS — Z12.5 ENCOUNTER FOR SCREENING FOR MALIGNANT NEOPLASM OF PROSTATE: ICD-10-CM

## 2022-04-08 DIAGNOSIS — I25.10 CORONARY ARTERY CALCIFICATION SEEN ON CT SCAN: Chronic | ICD-10-CM

## 2022-04-08 DIAGNOSIS — E78.00 PURE HYPERCHOLESTEROLEMIA: Primary | Chronic | ICD-10-CM

## 2022-04-08 DIAGNOSIS — R45.4 IRRITABILITY: ICD-10-CM

## 2022-04-08 PROCEDURE — 90732 PPSV23 VACC 2 YRS+ SUBQ/IM: CPT | Performed by: INTERNAL MEDICINE

## 2022-04-08 PROCEDURE — G0009 ADMIN PNEUMOCOCCAL VACCINE: HCPCS | Performed by: INTERNAL MEDICINE

## 2022-04-08 PROCEDURE — 99214 OFFICE O/P EST MOD 30 MIN: CPT | Performed by: INTERNAL MEDICINE

## 2022-04-08 RX ORDER — MELOXICAM 15 MG/1
TABLET ORAL
COMMUNITY
Start: 2022-03-10 | End: 2022-04-08

## 2022-04-08 NOTE — ASSESSMENT & PLAN NOTE
He has gained about 10 pounds.  Check A1c level today.  If A1c is greater than 6 consider Metformin.  I advised him to maintain a low carbohydrate diet and try to reduce alcohol intake.

## 2022-04-08 NOTE — PROGRESS NOTES
I N T E R N A L  M E D I C I N E  J U N O H  K I M,  M D      ENCOUNTER DATE:  04/08/2022    Luca Carolina . / 66 y.o. / male      CHIEF COMPLAINT / REASON FOR OFFICE VISIT     Hyperlipidemia and Prediabetes      ASSESSMENT & PLAN     Problem List Items Addressed This Visit        High    Pure hypercholesterolemia - Primary (Chronic)    Overview     Continue atorvastatin 40 mg.            Relevant Medications    atorvastatin (LIPITOR) 40 MG tablet       Medium    Prediabetes (Chronic)    Overview     Maintain a low sugar/starch/carbohydrate diet and exercise regularly. Wt loss advised.             Current Assessment & Plan     He has gained about 10 pounds.  Check A1c level today.  If A1c is greater than 6 consider Metformin.  I advised him to maintain a low carbohydrate diet and try to reduce alcohol intake.           Relevant Orders    Hemoglobin A1c    Coronary artery calcification seen on CT scan (Chronic)    Overview     4/2021 : noted CT coronary calcium score (extensive atherosclerotic burden of RCA and LAD)    Continue ASA 81 mg qd and atorvastatin 40 mg qd.            Relevant Medications    aspirin 81 MG EC tablet    atorvastatin (LIPITOR) 40 MG tablet       Low    Irritability (Chronic)    Overview     Continue sertraline 50 mg           Relevant Medications    sertraline (ZOLOFT) 50 MG tablet    BPH with elevated PSA (Chronic)    Current Assessment & Plan     Check PSA level today.    Lab Results   Component Value Date    PSA 2.240 04/02/2021    PSA 2.240 09/18/2020    PSA 2.170 03/23/2020                 Other Visit Diagnoses     Suspected sleep apnea        Relevant Orders    Ambulatory Referral to Sleep Medicine    Encounter for screening for malignant neoplasm of prostate        Relevant Orders    PSA Screen        Orders Placed This Encounter   Procedures   • Pneumococcal Polysaccharide Vaccine 23-Valent Greater Than or Equal To 3yo Subcutaneous / IM   • Hemoglobin A1c   • PSA Screen   •  "Ambulatory Referral to Sleep Medicine     New Medications Ordered This Visit   Medications   • sertraline (ZOLOFT) 50 MG tablet     Sig: Take 1 tablet by mouth Daily.     Dispense:  90 tablet     Refill:  1       SUMMARY/DISCUSSION  •       Next Appointment with me: Visit date not found    Return in about 6 months (around 10/8/2022) for COMBINED AWV AND MEDICAL F/U (30 MIN).      VITAL SIGNS     Visit Vitals  /76   Pulse 78   Temp 97.4 °F (36.3 °C)   Ht 182.9 cm (72.01\")   Wt 96.2 kg (212 lb)   SpO2 98%   BMI 28.75 kg/m²       BP Readings from Last 3 Encounters:   04/08/22 118/76   12/14/21 117/79   12/02/21 122/83     Wt Readings from Last 3 Encounters:   04/08/22 96.2 kg (212 lb)   12/14/21 86.2 kg (190 lb)   12/02/21 86.2 kg (190 lb)     Body mass index is 28.75 kg/m².    Blood pressure readings recorded on patient flowsheet:  No flowsheet data found.       MEDICATIONS AT THE TIME OF OFFICE VISIT     Current Outpatient Medications on File Prior to Visit   Medication Sig   • aspirin 81 MG EC tablet Take 1 tablet by mouth Daily.   • atorvastatin (LIPITOR) 40 MG tablet Take 1 tablet by mouth Daily.   • Loratadine 10 MG capsule Take 1 tablet by mouth Daily.   • sertraline (ZOLOFT) 50 MG tablet Take 1 tablet by mouth Daily.   • Triamcinolone Acetonide (NASACORT) 55 MCG/ACT nasal inhaler 2 sprays into each nostril daily.     HISTORY OF PRESENT ILLNESS     Patient expresses concern about sleep apnea.  He has history of snoring and complains of daytime sleepiness and tiredness.  He does drink moderately heavily.  He has gained about 10 pounds since the last visit.  He is compliant with his medications including atorvastatin for coronary artery calcification, sertraline 50 mg for irritability.      Patient Care Team:  Christiano Dewey MD as PCP - General (Internal Medicine)    REVIEW OF SYSTEMS     Review of Systems   Constitutional neg except per HPI   Resp snoring   CV neg   No significant change in mood or cognition "     PHYSICAL EXAMINATION     Physical Exam  Overweight   Alert with normal thought and judgment.   Cardiovascular: Normal rate, regular rhythm.       REVIEWED DATA     Labs:     Lab Results   Component Value Date     10/08/2021    K 5.2 10/08/2021    CALCIUM 9.9 10/08/2021    AST 26 10/08/2021    ALT 26 10/08/2021    BUN 18 10/08/2021    CREATININE 0.86 10/08/2021    CREATININE 0.77 11/24/2020    CREATININE 0.93 03/16/2020    EGFRIFNONA 89 10/08/2021    EGFRIFAFRI 108 10/08/2021       Lab Results   Component Value Date    HGBA1C 5.80 (H) 10/08/2021    HGBA1C 5.10 04/02/2021    HGBA1C 5.70 (H) 11/24/2020       Lab Results   Component Value Date    LDL 97 10/08/2021    LDL 91 11/24/2020    HDL 61 (H) 10/08/2021    TRIG 121 10/08/2021       Lab Results   Component Value Date    TSH 2.040 03/23/2020    TSH 3.200 11/08/2019    FREET4 1.09 03/23/2020    FREET4 1.04 11/08/2019       Lab Results   Component Value Date    WBC 5.37 01/04/2021    HGB 14.3 01/04/2021     01/04/2021     Lab Results   Component Value Date    PSA 2.240 04/02/2021    PSA 2.240 09/18/2020    PSA 2.170 03/23/2020      No results found for: MICROALBUR        Imaging:           Medical Tests:           Summary of old records / correspondence / consultant report:           Request outside records:             *Examiner was wearing KN95 mask and eye protection during the entire duration of the visit. Patient was masked the entire time. Minimum social distance of 6 ft maintained entire visit except if physical contact was necessary as documented.       Template created by Tor Dewey MD

## 2022-04-08 NOTE — ASSESSMENT & PLAN NOTE
Check PSA level today.    Lab Results   Component Value Date    PSA 2.240 04/02/2021    PSA 2.240 09/18/2020    PSA 2.170 03/23/2020

## 2022-04-08 NOTE — PATIENT INSTRUCTIONS
** IMPORTANT MESSAGE FROM DR. HOOD **    In our office, your satisfaction is VERY important to us.     You may receive a survey from [facility] by mail or E-mail for you to provide feedback about your visit. This information is invaluable for me to know what we can do to improve our services.     I ask that you please take a few minutes to complete the survey and let us know how we are doing in serving your needs. (You may receive the survey more than once for multiple visits)    Thank You !    Dr. Hood    _________________________________________________________________________________________________________________________      ** ADDITIONAL INSTRUCTION / REMINDERS FROM DR. HOOD **

## 2022-04-09 LAB
HBA1C MFR BLD: 6.1 % (ref 4.8–5.6)
PSA SERPL-MCNC: 2.2 NG/ML (ref 0–4)

## 2022-04-11 DIAGNOSIS — R73.03 PREDIABETES: Primary | ICD-10-CM

## 2022-04-11 RX ORDER — METFORMIN HYDROCHLORIDE 500 MG/1
500 TABLET, EXTENDED RELEASE ORAL
Qty: 30 TABLET | Refills: 3 | Status: SHIPPED | OUTPATIENT
Start: 2022-04-11 | End: 2022-07-25

## 2022-04-11 NOTE — ASSESSMENT & PLAN NOTE
Lab Results   Component Value Date    HGBA1C 6.1 (H) 04/08/2022    HGBA1C 5.80 (H) 10/08/2021    HGBA1C 5.10 04/02/2021        Start metformin  mg qd.

## 2022-04-11 NOTE — PROGRESS NOTES
CALL PATIENT with results:    1. A1c level for blood sugar average is now higher at prediabetes range.   - recommend starting metformin  mg with supper (Dx prediabetes) for glucose control, prevention of diabetes, and possible weight loss.     2. PSA (for prostate cancer screening) is stable. Check annually.

## 2022-04-19 DIAGNOSIS — E78.00 PURE HYPERCHOLESTEROLEMIA: Chronic | ICD-10-CM

## 2022-04-19 DIAGNOSIS — I25.10 CORONARY ARTERY CALCIFICATION SEEN ON CT SCAN: ICD-10-CM

## 2022-04-19 RX ORDER — ATORVASTATIN CALCIUM 40 MG/1
40 TABLET, FILM COATED ORAL DAILY
Qty: 90 TABLET | Refills: 3 | Status: SHIPPED | OUTPATIENT
Start: 2022-04-19

## 2022-06-07 ENCOUNTER — OFFICE VISIT (OUTPATIENT)
Dept: SLEEP MEDICINE | Facility: HOSPITAL | Age: 67
End: 2022-06-07

## 2022-06-07 VITALS
OXYGEN SATURATION: 98 % | DIASTOLIC BLOOD PRESSURE: 87 MMHG | HEIGHT: 72 IN | WEIGHT: 208 LBS | BODY MASS INDEX: 28.17 KG/M2 | HEART RATE: 69 BPM | SYSTOLIC BLOOD PRESSURE: 136 MMHG

## 2022-06-07 DIAGNOSIS — G47.61 PLMD (PERIODIC LIMB MOVEMENT DISORDER): ICD-10-CM

## 2022-06-07 DIAGNOSIS — G47.33 OSA (OBSTRUCTIVE SLEEP APNEA): Primary | ICD-10-CM

## 2022-06-07 DIAGNOSIS — F45.8 BRUXISM: ICD-10-CM

## 2022-06-07 DIAGNOSIS — R29.818 SUSPECTED SLEEP APNEA: ICD-10-CM

## 2022-06-07 DIAGNOSIS — G47.52 REM SLEEP BEHAVIOR DISORDER: ICD-10-CM

## 2022-06-07 PROCEDURE — G0463 HOSPITAL OUTPT CLINIC VISIT: HCPCS

## 2022-06-07 NOTE — PROGRESS NOTES
Sleep Medicine initial Consultation    Luca Carolina Jr.  : 1955  66 y.o. male   Date of Service: 2022  Referring provider: Christiano Dewey MD    History Of Present Illness:   Mr. Luca Carolina Jr.  is a 66 y.o. left handed (ambidextrous) Caucasianmale is seen in the sleep clinic for Snoring, Excessive Daytime Sleepiness, Sleep Apnea and Chronic Fatigue.     The patient has a H/O hypertension, hyperlipidemia, coronary artery disease based on CT scan calcium scoring of 558 and Prediabetes and is on metformin.    The patient c/o excessive daytime sleepiness,  and chronic fatigue.  There is no history of hypnagogic hallucinations, sleep paralysis or cataplexy.    The patient complains of snoring loud in all sleeping positions and excessive sweating during sleep.    The patient complains of leg jerking during sleep and bruxism during sleep.     The patient complains of difficulty staying asleep, frequent awakenings spontaneously or to use the bathroom, has restless sleep, Does not feel rested even after a long sleep and Still feels sleepy even when increasing sleep time.    The patient reports no history of sleepwalking, bedwetting at night, frequent nightmares and wake up screaming at night.  She is started on metformin, he has occasional episodes of dream enactment where he is fighting or cursing which occurred last night.    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    Sleep schedule: While Working: Bed time: 10 PM  and wake up time: 7-8 am: sleep Latency : 10 minutes and Total Sleep Time: 7 hours, 30 minutes.     Naps: No.    Sleep schedule: While NOT Working: Bed time: 11 pm and wake up time: 8 am: sleep Latency : 20 minutes and Total Sleep Time: 7 hours, 30 minutes.     Caffeine intake: Half coffee in the morning and while at work may be another carbonated caffeinated drink.  Albert Lea Sleepiness Scale: .    PMH, PSH, Medications, allergies, FH, SH are reviewed and updated in the  "chart.     Review of Systems   HENT: Positive for tinnitus and trouble swallowing (Difficulty swallowing big pills).    Eyes: Positive for itching.   Respiratory: Positive for apnea.    All other systems reviewed and are negative.    Patient examination:  Vitals:    06/07/22 1030   BP: 136/87   Pulse: 69   SpO2: 98%   Weight: 94.3 kg (208 lb)   Height: 182.9 cm (72.01\")    Body mass index is 28.2 kg/m².  Neck Circumference: 15 inches   Well-developed, well-nourished in no apparent distress.  HEENT: Normal  Neck: Supple no carotid bruits.  Lungs: Clear to auscultation.  Cardiac exam: Normal and regular rate and rhythm. No murmurs.  Extremities: No edema clubbing or cyanosis.    ASSESSMENT AND PLAN:The patient has symptoms of obstructive sleep apnea syndrome with hypersomnia. We will proceed with polysomnography and treat with CPAP therapy if needed. Sleep hygiene techniques discussed.  Exercise diet and weight loss discussed.    Encounter Diagnoses   Name Primary?   • Suspected sleep apnea    • NHI (obstructive sleep apnea) Yes   • PLMD (periodic limb movement disorder)    • Bruxism    • REM sleep behavior disorder      Orders Placed This Encounter   Procedures   • Polysomnography 4 or More Parameters     Return in about 3 months (around 9/7/2022).    Leah Moe MD  6/7/2022  11:05 EDT  "

## 2022-07-07 ENCOUNTER — HOSPITAL ENCOUNTER (OUTPATIENT)
Dept: SLEEP MEDICINE | Facility: HOSPITAL | Age: 67
Discharge: HOME | End: 2022-07-07
Admitting: PSYCHIATRY & NEUROLOGY

## 2022-07-07 VITALS — BODY MASS INDEX: 28.17 KG/M2 | WEIGHT: 208 LBS | HEIGHT: 72 IN

## 2022-07-07 DIAGNOSIS — G47.61 PLMD (PERIODIC LIMB MOVEMENT DISORDER): ICD-10-CM

## 2022-07-07 DIAGNOSIS — G47.33 OSA (OBSTRUCTIVE SLEEP APNEA): ICD-10-CM

## 2022-07-07 DIAGNOSIS — F45.8 BRUXISM: ICD-10-CM

## 2022-07-07 DIAGNOSIS — G47.52 REM SLEEP BEHAVIOR DISORDER: ICD-10-CM

## 2022-07-07 PROCEDURE — 95810 POLYSOM 6/> YRS 4/> PARAM: CPT

## 2022-07-07 PROCEDURE — 95810 POLYSOM 6/> YRS 4/> PARAM: CPT | Performed by: INTERNAL MEDICINE

## 2022-07-15 DIAGNOSIS — R06.83 SNORING: ICD-10-CM

## 2022-07-15 DIAGNOSIS — G47.33 OSA (OBSTRUCTIVE SLEEP APNEA): Primary | ICD-10-CM

## 2022-07-19 ENCOUNTER — TELEPHONE (OUTPATIENT)
Dept: SLEEP MEDICINE | Facility: HOSPITAL | Age: 67
End: 2022-07-19

## 2022-07-19 NOTE — TELEPHONE ENCOUNTER
Spoke with patient about sleep study results , sending orders to [facility], was informed to schedule follow up once set up on CPAP for compliance visit

## 2022-07-22 DIAGNOSIS — R73.03 PREDIABETES: ICD-10-CM

## 2022-07-25 RX ORDER — METFORMIN HYDROCHLORIDE 500 MG/1
TABLET, EXTENDED RELEASE ORAL
Qty: 90 TABLET | Refills: 3 | Status: SHIPPED | OUTPATIENT
Start: 2022-07-25

## 2022-10-10 ENCOUNTER — OFFICE VISIT (OUTPATIENT)
Dept: INTERNAL MEDICINE | Age: 67
End: 2022-10-10

## 2022-10-10 VITALS
OXYGEN SATURATION: 98 % | HEIGHT: 72 IN | SYSTOLIC BLOOD PRESSURE: 122 MMHG | WEIGHT: 207 LBS | TEMPERATURE: 97.3 F | DIASTOLIC BLOOD PRESSURE: 78 MMHG | BODY MASS INDEX: 28.04 KG/M2 | HEART RATE: 67 BPM

## 2022-10-10 DIAGNOSIS — E78.00 PURE HYPERCHOLESTEROLEMIA: Chronic | ICD-10-CM

## 2022-10-10 DIAGNOSIS — R97.20 BPH WITH ELEVATED PSA: Chronic | ICD-10-CM

## 2022-10-10 DIAGNOSIS — N40.0 BPH WITH ELEVATED PSA: Chronic | ICD-10-CM

## 2022-10-10 DIAGNOSIS — G47.33 OSA ON CPAP: ICD-10-CM

## 2022-10-10 DIAGNOSIS — R73.03 PREDIABETES: Chronic | ICD-10-CM

## 2022-10-10 DIAGNOSIS — I25.10 CORONARY ARTERY CALCIFICATION SEEN ON CT SCAN: Chronic | ICD-10-CM

## 2022-10-10 DIAGNOSIS — Z00.00 MEDICARE ANNUAL WELLNESS VISIT, SUBSEQUENT: Primary | ICD-10-CM

## 2022-10-10 DIAGNOSIS — Z99.89 OSA ON CPAP: ICD-10-CM

## 2022-10-10 DIAGNOSIS — R45.4 IRRITABILITY: Chronic | ICD-10-CM

## 2022-10-10 PROCEDURE — G0439 PPPS, SUBSEQ VISIT: HCPCS | Performed by: INTERNAL MEDICINE

## 2022-10-10 PROCEDURE — 1159F MED LIST DOCD IN RCRD: CPT | Performed by: INTERNAL MEDICINE

## 2022-10-10 PROCEDURE — 91312 COVID-19 (PFIZER) BIVALENT BOOSTER 12+YRS: CPT | Performed by: INTERNAL MEDICINE

## 2022-10-10 PROCEDURE — 0124A PR ADM SARSCOV2 30MCG/0.3ML BST: CPT | Performed by: INTERNAL MEDICINE

## 2022-10-10 PROCEDURE — 1170F FXNL STATUS ASSESSED: CPT | Performed by: INTERNAL MEDICINE

## 2022-10-10 NOTE — ASSESSMENT & PLAN NOTE
Continue metformin  mg daily.  Recommended weight loss goal of 10 to 15 pounds.  Check A1c today.    Lab Results   Component Value Date    HGBA1C 6.1 (H) 04/08/2022    HGBA1C 5.80 (H) 10/08/2021    HGBA1C 5.10 04/02/2021

## 2022-10-10 NOTE — ASSESSMENT & PLAN NOTE
No significant change in urination.  Check PSA annually.    Lab Results   Component Value Date    PSA 2.2 04/08/2022    PSA 2.240 04/02/2021    PSA 2.240 09/18/2020

## 2022-10-10 NOTE — PROGRESS NOTES
I N T E R N A L  M E D I C I N E  J U N O H  K I M,  M D      ENCOUNTER DATE:  10/10/2022    Luca Carolina Jr. / 66 y.o. / male    MEDICARE ANNUAL WELLNESS VISIT       Chief Complaint: Medicare Wellness-subsequent (4/2/21)       Patient's general assessment of his health since a year ago:     - Compared to one year ago, he feels his physical health is about the same without significant change.    - Compared to one year ago, he feels his mental health is about the same without significant change.      HPI for other active medical problems:     Patient remains compliant with all his medications.  Denies any chest pains or dyspnea on exertion.  He is on aspirin 81 mg daily for coronary atherosclerosis as evidenced on CT cardiac calcium score test.  He is on atorvastatin 40 mg without problems for hyperlipidemia.  He takes metformin  mg daily for prediabetes.  Weight remains unchanged.  He is on sertraline 50 mg daily for mood irritability.  Cannot side effects from medication.  He is now on CPAP for sleep apnea and he feels this is helping him sleep better.      HISTORY       Recent Hospitalizations:    Recent hospitalization?:     If YES, location, date, and diagnoses:     · Location:   · Date:   · Principle Discharge Dx:   · Secondary Dx:       Patient Care Team:    Patient Care Team:  Christiano Dewey MD as PCP - General (Internal Medicine)      Allergies:  Patient has no known allergies.    Medications:  Current Outpatient Medications on File Prior to Visit   Medication Sig Dispense Refill   • aspirin 81 MG EC tablet Take 1 tablet by mouth Daily.     • atorvastatin (LIPITOR) 40 MG tablet Take 1 tablet by mouth Daily. 90 tablet 3   • Loratadine 10 MG capsule Take 1 tablet by mouth Daily.     • metFORMIN ER (GLUCOPHAGE-XR) 500 MG 24 hr tablet TAKE ONE TABLET BY MOUTH DAILY WITH DINNER 90 tablet 3   • sertraline (ZOLOFT) 50 MG tablet Take 1 tablet by mouth Daily. 90 tablet 1   • Triamcinolone Acetonide  (NASACORT) 55 MCG/ACT nasal inhaler 2 sprays into each nostril daily.       No current facility-administered medications on file prior to visit.        PFSH:     The following portions of the patient's history were reviewed and updated as appropriate: Allergies / Current Medications / Past Medical History / Surgical History / Social History / Family History    Problem List:  Patient Active Problem List   Diagnosis   • Allergic rhinitis due to pollen   • Pure hypercholesterolemia   • Irritability   • Prediabetes   • BPH with elevated PSA   • Coronary artery calcification seen on CT scan   • NHI on CPAP       Past Medical History:  Past Medical History:   Diagnosis Date   • ADD (attention deficit disorder) 6/10/2016   • Allergic rhinitis due to pollen 6/10/2016   • 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    • HLD (hyperlipidemia) 6/10/2016       Past Surgical History:  Past Surgical History:   Procedure Laterality Date   • COLONOSCOPY  03/18/2009    LAURIE Maya   • COLONOSCOPY N/A 2009    Dr. Zacarias   • COLONOSCOPY N/A 3/22/2019    Procedure: COLONOSCOPY to cecum/TI;  Surgeon: Georges Zacarias Jr., MD;  Location: [facility] ENDOSCOPY;  Service: General   • ENDOSCOPY N/A 3/22/2019    Procedure: ESOPHAGOGASTRODUODENOSCOPY;  Surgeon: Georges Zacarias Jr., MD;  Location: [facility] ENDOSCOPY;  Service: General   • HEMORRHOIDECTOMY     • SINUS SURGERY     • TOTAL HIP ARTHROPLASTY Left 02/02/2021       Social History:  Social History     Socioeconomic History   • Marital status:      Spouse name: Stacie*   • Number of children: 2   Tobacco Use   • Smoking status: Never   • Smokeless tobacco: Never   Substance and Sexual Activity   • Alcohol use: Yes     Alcohol/week: 5.0 standard drinks     Types: 5 Glasses of wine per week     Comment: 4-5 days (1 on weekdays, weekends 2-3)   • Drug use: No   • Sexual activity: Yes     Partners: Female       Family  "History:  Family History   Problem Relation Age of Onset   • Dementia Mother 90   • Aortic stenosis Father 87   • No Known Problems Sister    • No Known Problems Sister    • No Known Problems Brother    • ADD / ADHD Daughter    • Anxiety disorder Son    • Drug abuse Neg Hx    • Depression Neg Hx    • Alcohol abuse Neg Hx    • Colon cancer Neg Hx    • Prostate cancer Neg Hx          PATIENT ASSESSMENT     Vitals:  Vitals:    10/10/22 0905   BP: 122/78   Pulse: 67   Temp: 97.3 °F (36.3 °C)   SpO2: 98%   Weight: 93.9 kg (207 lb)   Height: 182.9 cm (72.01\")       BP Readings from Last 3 Encounters:   10/10/22 122/78   06/07/22 136/87   04/08/22 118/76     Wt Readings from Last 3 Encounters:   10/10/22 93.9 kg (207 lb)   07/07/22 94.3 kg (208 lb)   06/07/22 94.3 kg (208 lb)      Body mass index is 28.07 kg/m².    Blood pressure readings recorded on patient flowsheet:  No flowsheet data found.         Review of Systems:    Review of Systems  Constitutional neg except per HPI   Resp on CPAP now   CV neg   GI negative   negative   Neuro negative  Psych stable mood     Physical Exam:    Physical Exam  General: No acute distress  Psych: Normal thought and judgment   Cardiovascular Rate: normal. Rhythm: regular. Heart sounds: normal.   Pulm/Chest: Effort normal, breath sounds normal.  No carotid bruit.      Reviewed Data:    Labs:   Lab Results   Component Value Date     10/08/2021    K 5.2 10/08/2021    CALCIUM 9.9 10/08/2021    AST 26 10/08/2021    ALT 26 10/08/2021    BUN 18 10/08/2021    CREATININE 0.86 10/08/2021    CREATININE 0.77 11/24/2020    CREATININE 0.93 03/16/2020    EGFRIFNONA 89 10/08/2021    EGFRIFAFRI 108 10/08/2021       Lab Results   Component Value Date    HGBA1C 6.1 (H) 04/08/2022    HGBA1C 5.80 (H) 10/08/2021    HGBA1C 5.10 04/02/2021       Lab Results   Component Value Date    LDL 97 10/08/2021    LDL 91 11/24/2020    LDL 93 03/16/2020    HDL 61 (H) 10/08/2021    TRIG 121 10/08/2021    " CHOLHDLRATIO 2.93 10/08/2021       Lab Results   Component Value Date    TSH 2.040 03/23/2020    FREET4 1.09 03/23/2020          Lab Results   Component Value Date    WBC 5.37 01/04/2021    HGB 14.3 01/04/2021    HGB 14.1 03/23/2020    HGB 14.1 01/02/2019     01/04/2021                   Lab Results   Component Value Date    PSA 2.2 04/08/2022    PSA 2.240 04/02/2021    PSA 2.240 09/18/2020       Imaging:          Medical Tests:          Screening for Glaucoma:  Previous screening for glaucoma?: Yes      Hearing Loss Screen:  Finger Rub Hearing Test (right ear): passed  Finger Rub Hearing Test (left ear): passed      Urinary Incontinence Screen:  Episodes of urinary incontinence? : No      Depression Screen:  PHQ-2/PHQ-9 Depression Screening 10/10/2022   Retired PHQ-9 Total Score -   Retired Total Score -   Little Interest or Pleasure in Doing Things 0-->not at all   Feeling Down, Depressed or Hopeless 0-->not at all   PHQ-9: Brief Depression Severity Measure Score 0        PHQ-2: 0 (Not depressed)    PHQ-9: 0 (Negative screening for depression)       FUNCTIONAL, FALL RISK, & COGNITIVE SCREENING (Components below):    DATA:    Functional & Cognitive Status 10/10/2022   Do you have difficulty preparing food and eating? No   Do you have difficulty bathing yourself, getting dressed or grooming yourself? No   Do you have difficulty using the toilet? No   Do you have difficulty moving around from place to place? No   Do you have trouble with steps or getting out of a bed or a chair? No   Current Diet Well Balanced Diet   Dental Exam Up to date   Eye Exam Up to date   Exercise (times per week) 7 times per week   Current Exercises Include Cardiovascular Workout;Light Weights;Walking;Yard Work;Bicycling Outdoors   Current Exercise Activities Include -   Do you need help using the phone?  No   Are you deaf or do you have serious difficulty hearing?  No   Do you need help with transportation? No   Do you need help  shopping? No   Do you need help preparing meals?  No   Do you need help with housework?  No   Do you need help with laundry? No   Do you need help taking your medications? No   Do you need help managing money? No   Do you ever drive or ride in a car without wearing a seat belt? No   Do you have difficulty concentrating, remembering or making decisions? No         A) Assessment of Functional Ability:  (Assessment of ability to perform ADL's (showering/bathing, using toilet, dressing, feeding self, moving self around) and IADL's (use telephone, shop, prepare food, housekeep, do laundry, transport independently, take medications independently, and handle finances)    Degree of functional impairment: NONE (based on assessment noted above)      B) Assessment of Fall Risk:  Fall Risk Assessment was completed, and patient is at low risk for falls.       Need for further evaluation of gait, strength, and balance? : No    Timed Up and Go (TUG):   (>= 12 seconds indicates high risk for falling)    Observable abnormalities included: Normal gait pattern       C. Assessment of Cognitive Function:    Mini-Cog Test:     1) Registration (3 objects): Yes   2) Number of objects recalled: 3   3) Clock Draw: Passed? : N/A       Further evaluation required? : No        COUNSELING       A. Identification of Health Risk Factors:    Risk factors include: cardiovascular risk factors, depression / other psychiatric problems, excess alcohol and poor hearing      B. Age-Appropriate Screening Schedule:  (Refer to the list below for future screening recommendations based on patient's age, sex and/or medical conditions. Orders for these recommended tests are listed in the plan section. The patient has been provided with a written plan)    Health Maintenance Topics  Health Maintenance   Topic Date Due   • ZOSTER VACCINE (3 of 3) 06/29/2020   • INFLUENZA VACCINE  08/01/2022   • LIPID PANEL  10/08/2022   • PROSTATE CANCER SCREENING  04/08/2023   •  TDAP/TD VACCINES (3 - Td or Tdap) 01/08/2029       Health Maintenance Topics Due or Over-Due  Health Maintenance Due   Topic Date Due   • ZOSTER VACCINE (3 of 3) 06/29/2020   • COVID-19 Vaccine (4 - Booster for Pfizer series) 12/03/2021   • INFLUENZA VACCINE  08/01/2022   • LIPID PANEL  10/08/2022         C. Advanced Care Planning:    Advance Care Planning   ACP discussion was held with the patient during this visit. Patient has an advance directive (not in EMR), copy requested.       D. Patient Self-Management and Personalized Health Advice:    He has been provided with personalized counseling/information (including brochures/handouts) about:     -- optimizing diet/nutrition plans, weight management, alcohol use, recommended hearing testing and reducing risk for cardiac/vascular events with pre-existing disease      He has been recommended for the following preventative services which has been performed today, will be ordered today or ordered/performed on upcoming follow-up visit:     -- NUTRITION counseling provided, EXERCISE counseling provided, EYE exam for glaucoma screening recommended, CARDIOVASCULAR disease risk reduction counseling performed, FALL RISK assessment / plan of care completed, URINARY incontinence assessment done, screening for prostate cancer (discussed and PSA will be performed annually), **COLORECTAL cancer screening (colonoscopy/Cologuard discussed or ordered), vaccination for INFLUENZA administered/ (or recommended), vaccination for PNEUMOVAX/PCV administered (or recommended), vaccination for SHINGRIX administered  (or recommended), COVID-19 vaccination (and/or booster) recommendations provided, DIABETES screening performed (current/reviewed labs/lab ordered)      E. Miscellaneous Items:    -Aspirin use counseling: Taking ASA appropriately as indicated    -Discussed BMI with him. The BMI is above average; BMI management plan is completed (discussed plans for weight loss)    -Reviewed use of  high risk medication in the elderly: YES    -Reviewed for potential of harmful drug interactions in the elderly: YES        WRAP UP       Assessment & Plan:    1) MEDICARE ANNUAL WELLNESS VISIT    2) OTHER MEDICAL CONDITIONS ADDRESSED TODAY:            Problem List Items Addressed This Visit        High    Pure hypercholesterolemia (Chronic)    Overview     Continue atorvastatin 40 mg.          Relevant Medications    atorvastatin (LIPITOR) 40 MG tablet    Other Relevant Orders    Comprehensive Metabolic Panel    Lipid Panel With / Chol / HDL Ratio    Prediabetes (Chronic)    Overview     Continue metformin  mg daily         Current Assessment & Plan     Continue metformin  mg daily.  Recommended weight loss goal of 10 to 15 pounds.  Check A1c today.    Lab Results   Component Value Date    HGBA1C 6.1 (H) 04/08/2022    HGBA1C 5.80 (H) 10/08/2021    HGBA1C 5.10 04/02/2021             Relevant Medications    metFORMIN ER (GLUCOPHAGE-XR) 500 MG 24 hr tablet    Other Relevant Orders    Hemoglobin A1c    Urinalysis With Culture If Indicated - Urine, Clean Catch       Medium    Irritability (Chronic)    Overview     Continue sertraline 50 mg         Relevant Medications    sertraline (ZOLOFT) 50 MG tablet       Low    BPH with elevated PSA (Chronic)    Current Assessment & Plan     No significant change in urination.  Check PSA annually.    Lab Results   Component Value Date    PSA 2.2 04/08/2022    PSA 2.240 04/02/2021    PSA 2.240 09/18/2020             Coronary artery calcification seen on CT scan (Chronic)    Overview     4/2021 : noted CT coronary calcium score (extensive atherosclerotic burden of RCA and LAD)    Continue ASA 81 mg qd and atorvastatin 40 mg qd.          Relevant Medications    aspirin 81 MG EC tablet    atorvastatin (LIPITOR) 40 MG tablet    NHI on CPAP (Chronic)    Overview     Continue CPAP nightly         Current Assessment & Plan     Now on CPAP for NHI         Other Visit Diagnoses      Medicare annual wellness visit, subsequent    -  Primary                    Orders Placed This Encounter   Procedures   • COVID-19 Bivalent Booster (Pfizer) 12+yrs   • Comprehensive Metabolic Panel   • Hemoglobin A1c   • Lipid Panel With / Chol / HDL Ratio   • Urinalysis With Culture If Indicated - Urine, Clean Catch       Discussion / Summary:        Medications as of TODAY:              Current Outpatient Medications   Medication Sig Dispense Refill   • aspirin 81 MG EC tablet Take 1 tablet by mouth Daily.     • atorvastatin (LIPITOR) 40 MG tablet Take 1 tablet by mouth Daily. 90 tablet 3   • Loratadine 10 MG capsule Take 1 tablet by mouth Daily.     • metFORMIN ER (GLUCOPHAGE-XR) 500 MG 24 hr tablet TAKE ONE TABLET BY MOUTH DAILY WITH DINNER 90 tablet 3   • sertraline (ZOLOFT) 50 MG tablet Take 1 tablet by mouth Daily. 90 tablet 1   • Triamcinolone Acetonide (NASACORT) 55 MCG/ACT nasal inhaler 2 sprays into each nostril daily.       No current facility-administered medications for this visit.         FOLLOW-UP:            Return in about 6 months (around 4/10/2023) for Hyperlipidemia, Prediabetes.               No future appointments.        After Visit Summary (AVS) including the Personalized Prevention  Plan Services (PPPS) was either printed and given to the patient at check-out today and/or sent to [facility] for review.         *Examiner was wearing KN95 mask and eye protection during the entire duration of the visit. Patient was masked the entire time. Minimum social distance of 6 ft maintained entire visit except if physical contact was necessary as documented.       Template created by Tor Dewey MD

## 2022-10-11 LAB
ALBUMIN SERPL-MCNC: 4.9 G/DL (ref 3.5–5.2)
ALBUMIN/GLOB SERPL: 3.3 G/DL
ALP SERPL-CCNC: 62 U/L (ref 39–117)
ALT SERPL-CCNC: 29 U/L (ref 1–41)
APPEARANCE UR: CLEAR
AST SERPL-CCNC: 32 U/L (ref 1–40)
BACTERIA #/AREA URNS HPF: NORMAL /HPF
BILIRUB SERPL-MCNC: 0.7 MG/DL (ref 0–1.2)
BILIRUB UR QL STRIP: NEGATIVE
BUN SERPL-MCNC: 16 MG/DL (ref 8–23)
BUN/CREAT SERPL: 18.4 (ref 7–25)
CALCIUM SERPL-MCNC: 9.7 MG/DL (ref 8.6–10.5)
CASTS URNS QL MICRO: NORMAL /LPF
CHLORIDE SERPL-SCNC: 103 MMOL/L (ref 98–107)
CHOLEST SERPL-MCNC: 174 MG/DL (ref 0–200)
CHOLEST/HDLC SERPL: 2.72 {RATIO}
CO2 SERPL-SCNC: 27.5 MMOL/L (ref 22–29)
COLOR UR: YELLOW
CREAT SERPL-MCNC: 0.87 MG/DL (ref 0.76–1.27)
EGFRCR SERPLBLD CKD-EPI 2021: 95.2 ML/MIN/1.73
EPI CELLS #/AREA URNS HPF: NORMAL /HPF (ref 0–10)
GLOBULIN SER CALC-MCNC: 1.5 GM/DL
GLUCOSE SERPL-MCNC: 98 MG/DL (ref 65–99)
GLUCOSE UR QL STRIP: NEGATIVE
HBA1C MFR BLD: 5.7 % (ref 4.8–5.6)
HDLC SERPL-MCNC: 64 MG/DL (ref 40–60)
HGB UR QL STRIP: NEGATIVE
KETONES UR QL STRIP: NEGATIVE
LDLC SERPL CALC-MCNC: 88 MG/DL (ref 0–100)
LEUKOCYTE ESTERASE UR QL STRIP: NEGATIVE
MICRO URNS: NORMAL
MICRO URNS: NORMAL
NITRITE UR QL STRIP: NEGATIVE
PH UR STRIP: 7 [PH] (ref 5–7.5)
POTASSIUM SERPL-SCNC: 5 MMOL/L (ref 3.5–5.2)
PROT SERPL-MCNC: 6.4 G/DL (ref 6–8.5)
PROT UR QL STRIP: NEGATIVE
RBC #/AREA URNS HPF: NORMAL /HPF (ref 0–2)
SODIUM SERPL-SCNC: 140 MMOL/L (ref 136–145)
SP GR UR STRIP: 1.02 (ref 1–1.03)
TRIGL SERPL-MCNC: 124 MG/DL (ref 0–150)
URINALYSIS REFLEX: NORMAL
UROBILINOGEN UR STRIP-MCNC: 0.2 MG/DL (ref 0.2–1)
VLDLC SERPL CALC-MCNC: 22 MG/DL (ref 5–40)
WBC #/AREA URNS HPF: NORMAL /HPF (ref 0–5)

## 2023-01-04 DIAGNOSIS — R45.4 IRRITABILITY: ICD-10-CM

## 2023-04-11 ENCOUNTER — OFFICE VISIT (OUTPATIENT)
Dept: INTERNAL MEDICINE | Age: 68
End: 2023-04-11
Payer: MEDICARE

## 2023-04-11 VITALS
WEIGHT: 211 LBS | OXYGEN SATURATION: 98 % | HEIGHT: 72 IN | TEMPERATURE: 96.6 F | HEART RATE: 65 BPM | SYSTOLIC BLOOD PRESSURE: 110 MMHG | DIASTOLIC BLOOD PRESSURE: 72 MMHG | BODY MASS INDEX: 28.58 KG/M2

## 2023-04-11 DIAGNOSIS — I25.10 CORONARY ARTERY CALCIFICATION SEEN ON CT SCAN: Chronic | ICD-10-CM

## 2023-04-11 DIAGNOSIS — K21.9 GASTROESOPHAGEAL REFLUX DISEASE, UNSPECIFIED WHETHER ESOPHAGITIS PRESENT: ICD-10-CM

## 2023-04-11 DIAGNOSIS — R73.03 PREDIABETES: Chronic | ICD-10-CM

## 2023-04-11 DIAGNOSIS — E78.00 PURE HYPERCHOLESTEROLEMIA: Chronic | ICD-10-CM

## 2023-04-11 DIAGNOSIS — Z12.5 ENCOUNTER FOR SCREENING FOR MALIGNANT NEOPLASM OF PROSTATE: ICD-10-CM

## 2023-04-11 DIAGNOSIS — R45.4 IRRITABILITY: Chronic | ICD-10-CM

## 2023-04-11 DIAGNOSIS — K21.9 LARYNGOPHARYNGEAL REFLUX (LPR): Primary | ICD-10-CM

## 2023-04-11 PROBLEM — J30.9 ALLERGIC RHINITIS: Status: ACTIVE | Noted: 2023-04-11

## 2023-04-11 LAB
ALBUMIN SERPL-MCNC: 5.1 G/DL (ref 3.5–5.2)
ALBUMIN/GLOB SERPL: 3.4 G/DL
ALP SERPL-CCNC: 57 U/L (ref 39–117)
ALT SERPL-CCNC: 37 U/L (ref 1–41)
AST SERPL-CCNC: 31 U/L (ref 1–40)
BILIRUB SERPL-MCNC: 0.6 MG/DL (ref 0–1.2)
BUN SERPL-MCNC: 13 MG/DL (ref 8–23)
BUN/CREAT SERPL: 14.3 (ref 7–25)
CALCIUM SERPL-MCNC: 10 MG/DL (ref 8.6–10.5)
CHLORIDE SERPL-SCNC: 102 MMOL/L (ref 98–107)
CHOLEST SERPL-MCNC: 164 MG/DL (ref 0–200)
CHOLEST/HDLC SERPL: 2.98 {RATIO}
CO2 SERPL-SCNC: 29 MMOL/L (ref 22–29)
CREAT SERPL-MCNC: 0.91 MG/DL (ref 0.76–1.27)
EGFRCR SERPLBLD CKD-EPI 2021: 92.4 ML/MIN/1.73
GLOBULIN SER CALC-MCNC: 1.5 GM/DL
GLUCOSE SERPL-MCNC: 97 MG/DL (ref 65–99)
HBA1C MFR BLD: 5.8 % (ref 4.8–5.6)
HDLC SERPL-MCNC: 55 MG/DL (ref 40–60)
LDLC SERPL CALC-MCNC: 86 MG/DL (ref 0–100)
POTASSIUM SERPL-SCNC: 4.6 MMOL/L (ref 3.5–5.2)
PROT SERPL-MCNC: 6.6 G/DL (ref 6–8.5)
PSA SERPL-MCNC: 2.98 NG/ML (ref 0–4)
SODIUM SERPL-SCNC: 139 MMOL/L (ref 136–145)
TRIGL SERPL-MCNC: 130 MG/DL (ref 0–150)
VLDLC SERPL CALC-MCNC: 23 MG/DL (ref 5–40)

## 2023-04-11 RX ORDER — FEXOFENADINE HCL 180 MG/1
180 TABLET ORAL DAILY
COMMUNITY
Start: 2023-04-11

## 2023-04-11 RX ORDER — PANTOPRAZOLE SODIUM 40 MG/1
40 TABLET, DELAYED RELEASE ORAL DAILY
Qty: 30 TABLET | Refills: 2 | Status: SHIPPED | OUTPATIENT
Start: 2023-04-11

## 2023-04-11 NOTE — PATIENT INSTRUCTIONS
** IMPORTANT MESSAGE FROM DR. HOOD **    In our office, your satisfaction is VERY important to us.     You may receive a survey from [employer] by mail or E-mail for you to provide feedback about your visit. This information is invaluable for me to know what we can do to improve our services.     I ask that you please take a few minutes to complete the survey and let us know how we are doing in serving your needs. (You may receive the survey more than once for multiple visits)    Thank You !    Dr. Hood    _________________________________________________________________________________________________________________________      ** ADDITIONAL INSTRUCTION / REMINDERS FROM DR. HOOD **    Learn about LPR (laryngopharyngeal reflux)  Treat allergies aggressively  Treat GERD aggressively

## 2023-04-11 NOTE — ASSESSMENT & PLAN NOTE
Discontinue loratadine and start fexofenadine 180 mg daily.  Continue nasal steroid and may add Astelin if needed.

## 2023-04-11 NOTE — ASSESSMENT & PLAN NOTE
Start pantoprazole 40 mg daily for probable LPR  If not improving after 1 month I advised him to see his ENT specialist for further evaluation.

## 2023-04-11 NOTE — PROGRESS NOTES
I N T E R N A L  M E D I C I N E    J U N O H  K I M,  M D      ENCOUNTER DATE:  04/11/2023    Luca Carolina Jr. / 67 y.o. / male    CHIEF COMPLAINT / REASON FOR OFFICE VISIT     Prediabetes and Hyperlipidemia      ASSESSMENT & PLAN     Problem List Items Addressed This Visit        High    Pure hypercholesterolemia (Chronic)    Overview     Goal LDL less than 70          Current Assessment & Plan     Lab Results   Component Value Date    LDL 88 10/10/2022    LDL 97 10/08/2021    LDL 91 11/24/2020     Lab Results   Component Value Date    HDL 64 (H) 10/10/2022    HDL 61 (H) 10/08/2021    HDL 70 (H) 11/24/2020     Lab Results   Component Value Date    TRIG 124 10/10/2022    TRIG 121 10/08/2021    TRIG 146 11/24/2020     Lab Results   Component Value Date    CHOLHDLRATIO 2.72 10/10/2022     Check lipid panel today.  Goal LDL less than 70.  Will likely increase atorvastatin to 80 mg if needed.         Relevant Medications    atorvastatin (LIPITOR) 40 MG tablet    Other Relevant Orders    Lipid Panel With / Chol / HDL Ratio    Comprehensive Metabolic Panel    Prediabetes (Chronic)    Overview     Continue metformin  mg daily         Relevant Medications    metFORMIN ER (GLUCOPHAGE-XR) 500 MG 24 hr tablet    Other Relevant Orders    Hemoglobin A1c    Laryngopharyngeal reflux (LPR) - Primary (Chronic)    Current Assessment & Plan     Start pantoprazole 40 mg daily for probable LPR  If not improving after 1 month I advised him to see his ENT specialist for further evaluation.         Relevant Medications    fexofenadine (ALLEGRA) 180 MG tablet    pantoprazole (PROTONIX) 40 MG EC tablet       Medium    Irritability (Chronic)    Overview     Continue sertraline 50 mg         Relevant Medications    sertraline (ZOLOFT) 50 MG tablet    Coronary artery calcification seen on CT scan (Chronic)    Overview     4/2021 : noted CT coronary calcium score (extensive atherosclerotic burden of RCA and LAD)    Continue ASA 81  "mg qd and atorvastatin 40 mg qd.          Relevant Medications    aspirin 81 MG EC tablet    atorvastatin (LIPITOR) 40 MG tablet    Other Relevant Orders    Lipid Panel With / Chol / HDL Ratio   Other Visit Diagnoses     Encounter for screening for malignant neoplasm of prostate        Relevant Orders    PSA Screen        Orders Placed This Encounter   Procedures   • Pneumococcal Conjugate Vaccine 20-Valent All   • Lipid Panel With / Chol / HDL Ratio   • Hemoglobin A1c   • Comprehensive Metabolic Panel   • PSA Screen     New Medications Ordered This Visit   Medications   • fexofenadine (ALLEGRA) 180 MG tablet     Sig: Take 1 tablet by mouth Daily.   • pantoprazole (PROTONIX) 40 MG EC tablet     Sig: Take 1 tablet by mouth Daily.     Dispense:  30 tablet     Refill:  2       SUMMARY/DISCUSSION  •       Next Appointment with me: Visit date not found    Return in about 6 months (around 10/11/2023) for Reassess chronic medical problems.      VITAL SIGNS     Vitals:    04/11/23 0732   BP: 110/72   Pulse: 65   Temp: 96.6 °F (35.9 °C)   SpO2: 98%   Weight: 95.7 kg (211 lb)   Height: 182.9 cm (72.01\")       BP Readings from Last 3 Encounters:   04/11/23 110/72   10/10/22 122/78   06/07/22 136/87     Wt Readings from Last 3 Encounters:   04/11/23 95.7 kg (211 lb)   10/10/22 93.9 kg (207 lb)   07/07/22 94.3 kg (208 lb)     Body mass index is 28.61 kg/m².    Blood pressure readings recorded on patient flowsheet:       View : No data to display.                   MEDICATIONS AT THE TIME OF OFFICE VISIT     Current Outpatient Medications on File Prior to Visit   Medication Sig   • aspirin 81 MG EC tablet Take 1 tablet by mouth Daily.   • atorvastatin (LIPITOR) 40 MG tablet Take 1 tablet by mouth Daily.   • metFORMIN ER (GLUCOPHAGE-XR) 500 MG 24 hr tablet TAKE ONE TABLET BY MOUTH DAILY WITH DINNER   • sertraline (ZOLOFT) 50 MG tablet TAKE ONE TABLET BY MOUTH DAILY   • Triamcinolone Acetonide (NASACORT) 55 MCG/ACT nasal inhaler 2 " sprays into the nostril(s) as directed by provider Daily.   • [DISCONTINUED] Loratadine 10 MG capsule Take 1 capsule by mouth Daily.     No current facility-administered medications on file prior to visit.          HISTORY OF PRESENT ILLNESS     Patient complains of chronic need for throat clearing.  He does have allergies with postnasal drainage intermittent heartburn symptoms.  He is taking loratadine 10 mg daily for allergies along with a nasal steroid.  He does not take anything for heartburn or reflux.  He is not on an ACE inhibitor.  He is taking atorvastatin 40 mg daily for hyperlipidemia without significant problems.  He has history of coronary artery calcification seen on CT scan.  He denies angina or dyspnea on exertion.  He is taking aspirin 81 mg daily.  He is also taking metformin  mg daily for prediabetes.  His weight has been trending up gradually.  He reports to snacking at nighttime regularly.  He is on sertraline for irritable mood and he denies any significant change in his symptoms.  Denies any significant side effects from the medication.      Patient Care Team:  Christiano Dewey MD as PCP - General (Internal Medicine)    REVIEW OF SYSTEMS     Review of Systems       PHYSICAL EXAMINATION     Physical Exam  General: No acute distress  Psych: Normal thought and judgment   Cardiovascular Rate: normal. Rhythm: regular. Heart sounds: normal   Pulm/Chest: Effort normal, breath sounds normal.       REVIEWED DATA     Labs:     Lab Results   Component Value Date     10/10/2022    K 5.0 10/10/2022    CALCIUM 9.7 10/10/2022    AST 32 10/10/2022    ALT 29 10/10/2022    BUN 16 10/10/2022    CREATININE 0.87 10/10/2022    CREATININE 0.86 10/08/2021    CREATININE 0.77 11/24/2020    EGFRIFNONA 89 10/08/2021    EGFRIFAFRI 108 10/08/2021       Lab Results   Component Value Date    HGBA1C 5.70 (H) 10/10/2022    HGBA1C 6.1 (H) 04/08/2022    HGBA1C 5.80 (H) 10/08/2021       Lab Results   Component Value Date     LDL 88 10/10/2022    LDL 97 10/08/2021    LDL 91 11/24/2020    HDL 64 (H) 10/10/2022    HDL 61 (H) 10/08/2021    TRIG 124 10/10/2022    TRIG 121 10/08/2021       Lab Results   Component Value Date    TSH 2.040 03/23/2020    TSH 3.200 11/08/2019    FREET4 1.09 03/23/2020    FREET4 1.04 11/08/2019       Lab Results   Component Value Date    WBC 5.37 01/04/2021    HGB 14.3 01/04/2021     01/04/2021       No results found for: MALBCRERATIO       Imaging:           Medical Tests:           Summary of old records / correspondence / consultant report:           Request outside records:             *Examiner was wearing [id number] mask during the entire duration of the visit. Patient was masked the entire time. Minimum social distance of 6 ft maintained entire visit except if physical contact was necessary as documented.       Template created by Tor Dewey MD

## 2023-04-11 NOTE — ASSESSMENT & PLAN NOTE
Lab Results   Component Value Date    LDL 88 10/10/2022    LDL 97 10/08/2021    LDL 91 11/24/2020     Lab Results   Component Value Date    HDL 64 (H) 10/10/2022    HDL 61 (H) 10/08/2021    HDL 70 (H) 11/24/2020     Lab Results   Component Value Date    TRIG 124 10/10/2022    TRIG 121 10/08/2021    TRIG 146 11/24/2020     Lab Results   Component Value Date    CHOLHDLRATIO 2.72 10/10/2022     Check lipid panel today.  Goal LDL less than 70.  Will likely increase atorvastatin to 80 mg if needed.

## 2023-04-12 ENCOUNTER — DOCUMENTATION (OUTPATIENT)
Dept: INTERNAL MEDICINE | Age: 68
End: 2023-04-12
Payer: MEDICARE

## 2023-04-12 DIAGNOSIS — E78.00 PURE HYPERCHOLESTEROLEMIA: ICD-10-CM

## 2023-04-12 DIAGNOSIS — R97.20 INCREASED PROSTATE SPECIFIC ANTIGEN (PSA) VELOCITY: Primary | ICD-10-CM

## 2023-04-12 NOTE — PROGRESS NOTES
CALL PATIENT with results:    PSA (for prostate cancer screening) is higher than a year ago.   -Recheck PSA in 6 months (1 week prior to followup)    LDL (bad cholesterol) remains > 70 target.   -Increase atorvastatin to 80 mg and recheck labs in 3 months     A1c level for blood sugar average is stable     Labs for kidney, liver and electrolytes are stable.

## 2023-04-13 DIAGNOSIS — E78.00 PURE HYPERCHOLESTEROLEMIA: Chronic | ICD-10-CM

## 2023-04-13 DIAGNOSIS — I25.10 CORONARY ARTERY CALCIFICATION SEEN ON CT SCAN: ICD-10-CM

## 2023-04-14 DIAGNOSIS — E78.00 PURE HYPERCHOLESTEROLEMIA: Chronic | ICD-10-CM

## 2023-04-14 DIAGNOSIS — I25.10 CORONARY ARTERY CALCIFICATION SEEN ON CT SCAN: ICD-10-CM

## 2023-04-14 RX ORDER — ATORVASTATIN CALCIUM 40 MG/1
TABLET, FILM COATED ORAL
Qty: 90 TABLET | Refills: 0 | Status: SHIPPED | OUTPATIENT
Start: 2023-04-14

## 2023-04-17 RX ORDER — ATORVASTATIN CALCIUM 80 MG/1
80 TABLET, FILM COATED ORAL NIGHTLY
Qty: 30 TABLET | Refills: 3 | OUTPATIENT
Start: 2023-04-17

## 2023-07-17 ENCOUNTER — TELEPHONE (OUTPATIENT)
Dept: INTERNAL MEDICINE | Age: 68
End: 2023-07-17

## 2023-07-18 NOTE — TELEPHONE ENCOUNTER
Spoke with pt and informed to contact the sleep provider . Pt said he will come later to  the form .

## 2023-07-27 DIAGNOSIS — I25.10 CORONARY ARTERY CALCIFICATION SEEN ON CT SCAN: ICD-10-CM

## 2023-07-27 DIAGNOSIS — E78.00 PURE HYPERCHOLESTEROLEMIA: Chronic | ICD-10-CM

## 2023-07-27 RX ORDER — ATORVASTATIN CALCIUM 40 MG/1
40 TABLET, FILM COATED ORAL DAILY
Qty: 90 TABLET | Refills: 1 | Status: SHIPPED | OUTPATIENT
Start: 2023-07-27

## 2023-08-02 DIAGNOSIS — R73.03 PREDIABETES: ICD-10-CM

## 2023-08-02 RX ORDER — METFORMIN HYDROCHLORIDE 500 MG/1
TABLET, EXTENDED RELEASE ORAL
Qty: 90 TABLET | Refills: 1 | Status: SHIPPED | OUTPATIENT
Start: 2023-08-02

## 2023-08-31 NOTE — ASSESSMENT & PLAN NOTE
Medication Name: levothyroxine 100 MCG tablet     EPA team unable to identify active coverage for the patient. Please contact the patient, and have them update their insurance information. The EPA team will be closing this request out at this time. When the patient updates their insurance, please re-request the prior authorization.    Per test claim- Patient is Not Covered, coverage terminiated    If patient has active pharmacy coverage patient will need to provider copy of pharmacy benefit  front/back card and have provider office upload and verify pharmacy benefits and if eligible resubmit prior auth.    No angina.  Previously started on ASA 81 mg qd and increased atorvastatin to 40 mg qd.

## 2023-10-17 ENCOUNTER — OFFICE VISIT (OUTPATIENT)
Dept: INTERNAL MEDICINE | Age: 68
End: 2023-10-17
Payer: MEDICARE

## 2023-10-17 VITALS
WEIGHT: 196 LBS | BODY MASS INDEX: 26.55 KG/M2 | OXYGEN SATURATION: 97 % | TEMPERATURE: 97.3 F | DIASTOLIC BLOOD PRESSURE: 80 MMHG | SYSTOLIC BLOOD PRESSURE: 126 MMHG | HEIGHT: 72 IN | HEART RATE: 67 BPM

## 2023-10-17 DIAGNOSIS — R45.4 IRRITABILITY: Chronic | ICD-10-CM

## 2023-10-17 DIAGNOSIS — R97.20 INCREASED PROSTATE SPECIFIC ANTIGEN (PSA) VELOCITY: ICD-10-CM

## 2023-10-17 DIAGNOSIS — Z00.00 MEDICARE ANNUAL WELLNESS VISIT, SUBSEQUENT: Primary | ICD-10-CM

## 2023-10-17 DIAGNOSIS — E78.00 PURE HYPERCHOLESTEROLEMIA: Chronic | ICD-10-CM

## 2023-10-17 DIAGNOSIS — K40.90 LEFT INGUINAL HERNIA: ICD-10-CM

## 2023-10-17 DIAGNOSIS — R73.03 PREDIABETES: Chronic | ICD-10-CM

## 2023-10-17 DIAGNOSIS — I25.10 CORONARY ARTERY CALCIFICATION SEEN ON CT SCAN: Chronic | ICD-10-CM

## 2023-10-17 NOTE — PROGRESS NOTES
"    I N T E R N A L  M E D I C I N E    J U N O H  K I M,  M D      ENCOUNTER DATE:  10/17/2023    Luca Carolina Jr. / 68 y.o. / male    MEDICARE ANNUAL WELLNESS VISIT       Chief Complaint:    Chief Complaint   Patient presents with    Medicare Wellness-subsequent     10/10/22    chronic medical problems       Patient's general assessment of his health since a year ago:     - Compared to one year ago, he feels his physical health is:   [x] About the same  [] Better  [] Little worse  [] Significantly worse  []     - Compared to one year ago, he feels his mental health is   [x] About the same  [] Better  [] Little worse  [] Significantly worse  []     HPI for other active medical problems:     He thinks he may have a \"hernia\" on left groin area. It tends to bulge out but self reduces. No pain, nausea or vomiting.     Prior PSA was higher than usual and repeat PSA now shows an abnormal PSA velocity. Denies significant change in urination. Denies family history of prostate cancer.     On atorvastatin 40 mg for hyperlipidemia without problems     On sertraline 50 mg for irritability without significant problems     CT coronary calcium score previously showed extensive coronary artery calcification. Denies chest pain or CHAMBERS. On ASA 81 mg.       HISTORY       Recent Hospitalizations:    Recent hospitalization?:     If YES, location, date, and diagnoses:     Location:   Date:   Principle Discharge Dx:   Secondary Dx:       Patient Care Team:    Patient Care Team:  Christiano Dewey MD as PCP - General (Internal Medicine)      Allergies:  Patient has no known allergies.    Medications:  Current Outpatient Medications on File Prior to Visit   Medication Sig Dispense Refill    amoxicillin-clavulanate (AUGMENTIN) 875-125 MG per tablet Take 1 tablet by mouth 2 (Two) Times a Day for 7 days. TAKE WITH FOOD 14 tablet 0    aspirin 81 MG EC tablet Take 1 tablet by mouth Daily.      atorvastatin (LIPITOR) 40 MG tablet Take 1 tablet by " mouth Daily. 90 tablet 1    fexofenadine (ALLEGRA) 180 MG tablet Take 1 tablet by mouth Daily.      metFORMIN ER (GLUCOPHAGE-XR) 500 MG 24 hr tablet TAKE ONE TABLET BY MOUTH DAILY WITH DINNER. 90 tablet 1    methylPREDNISolone (MEDROL) 4 MG dose pack Take as directed on package instructions. 21 tablet 0    pantoprazole (PROTONIX) 40 MG EC tablet TAKE ONE TABLET BY MOUTH DAILY 90 tablet 3    sertraline (ZOLOFT) 50 MG tablet TAKE ONE TABLET BY MOUTH DAILY 90 tablet 1    Triamcinolone Acetonide (NASACORT) 55 MCG/ACT nasal inhaler 2 sprays into the nostril(s) as directed by provider Daily.       No current facility-administered medications on file prior to visit.        PFSH:     The following portions of the patient's history were reviewed and updated as appropriate: Allergies / Current Medications / Past Medical History / Surgical History / Social History / Family History    Problem List:  Patient Active Problem List   Diagnosis    Allergic rhinitis due to pollen    Pure hypercholesterolemia    Irritability    Prediabetes    BPH with elevated PSA    Coronary artery calcification seen on CT scan    NHI on CPAP    Laryngopharyngeal reflux (LPR)    Allergic rhinitis    Left inguinal hernia    Increased prostate specific antigen (PSA) velocity       Past Medical History:  Past Medical History:   Diagnosis Date    ADD (attention deficit disorder) 06/10/2016    Allergic 1973    Allergic rhinitis due to pollen 06/10/2016    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     HLD (hyperlipidemia) 06/10/2016       Past Surgical History:  Past Surgical History:   Procedure Laterality Date    COLONOSCOPY  03/18/2009    LAURIE Maya    COLONOSCOPY N/A 2009    Dr. Zacarias    COLONOSCOPY N/A 3/22/2019    Procedure: COLONOSCOPY to cecum/TI;  Surgeon: Georges Zacarias Jr., MD;  Location: [facility] ENDOSCOPY;  Service: General    ENDOSCOPY N/A 3/22/2019    Procedure:  "ESOPHAGOGASTRODUODENOSCOPY;  Surgeon: Georges Zacarias Jr., MD;  Location: [facility] ENDOSCOPY;  Service: General    HEMORRHOIDECTOMY      SINUS SURGERY      TOTAL HIP ARTHROPLASTY Left 02/02/2021       Social History:  Social History     Socioeconomic History    Marital status:      Spouse name: Stacie*    Number of children: 2   Tobacco Use    Smoking status: Never    Smokeless tobacco: Never   Vaping Use    Vaping Use: Never used   Substance and Sexual Activity    Alcohol use: Yes     Alcohol/week: 8.0 standard drinks of alcohol     Types: 6 Cans of beer, 2 Drinks containing 0.5 oz of alcohol per week     Comment: 2 days (1 on weekdays, weekends 2-3)    Drug use: No    Sexual activity: Not Currently     Partners: Female     Birth control/protection: None     Comment: Need to improve sex life       Family History:  Family History   Problem Relation Age of Onset    Dementia Mother 90    Aortic stenosis Father 87    No Known Problems Sister     No Known Problems Sister     No Known Problems Brother     ADD / ADHD Daughter     Anxiety disorder Son     Drug abuse Neg Hx     Depression Neg Hx     Alcohol abuse Neg Hx     Colon cancer Neg Hx     Prostate cancer Neg Hx          PATIENT ASSESSMENT     Vitals:  Vitals:    10/17/23 1126   BP: 126/80   Pulse: 67   Temp: 97.3 °F (36.3 °C)   SpO2: 97%   Weight: 88.9 kg (196 lb)   Height: 182.9 cm (72\")       BP Readings from Last 3 Encounters:   10/17/23 126/80   10/12/23 125/83   04/11/23 110/72     Wt Readings from Last 3 Encounters:   10/17/23 88.9 kg (196 lb)   10/12/23 86.2 kg (190 lb)   07/21/23 93.9 kg (207 lb)      Body mass index is 26.58 kg/m².    Blood pressure readings recorded on patient flowsheet:       No data to display                    Review of Systems:    Review of Systems  Constitutional neg except per HPI   Resp neg  CV neg   GI oropharyngeal dysphagia    negative   No significant change in mood.   Neuro negative    Physical Exam:    Physical " Exam  General: No acute distress  Psych: Normal thought and judgment   Cardiovascular Rate: normal. Rhythm: regular. Heart sounds: normal   Pulm/Chest: Effort normal, breath sounds normal.   No carotid bruit.    No lower extremity edema   Left inguinal hernia without incarceration or tenderness     Reviewed Data:    Labs:   Lab Results   Component Value Date     04/11/2023    K 4.6 04/11/2023    CALCIUM 10.0 04/11/2023    AST 25 07/13/2023    ALT 26 07/13/2023    BUN 13 04/11/2023    CREATININE 0.91 04/11/2023    CREATININE 0.87 10/10/2022    CREATININE 0.86 10/08/2021    EGFRIFNONA 89 10/08/2021    EGFRIFAFRI 108 10/08/2021       Lab Results   Component Value Date    HGBA1C 5.80 (H) 04/11/2023    HGBA1C 5.70 (H) 10/10/2022    HGBA1C 6.1 (H) 04/08/2022       Lab Results   Component Value Date    LDL 99 07/13/2023    LDL 86 04/11/2023    LDL 88 10/10/2022    HDL 57 07/13/2023    TRIG 79 07/13/2023    CHOLHDLRATIO 3.00 07/13/2023       Lab Results   Component Value Date    TSH 2.040 03/23/2020    FREET4 1.09 03/23/2020          Lab Results   Component Value Date    WBC 5.37 01/04/2021    HGB 14.3 01/04/2021    HGB 14.1 03/23/2020    HGB 14.1 01/02/2019     01/04/2021                   Lab Results   Component Value Date    PSA 3.5 10/10/2023    PSA 2.980 04/11/2023    PSA 2.2 04/08/2022       Imaging:          Medical Tests:          Screening for Glaucoma:  Previous screening for glaucoma?:   [] YES  [] NO  []     Hearing Loss Screen:  Finger Rub Hearing Test (right ear):   [] PASSED  [] FAILED  [] BORDERLINE  [] HAS HEARING AID  [] USING HEARING AID  [] NOT USING HEARING AID  []     Finger Rub Hearing Test (left ear):   [] PASSED  [] FAILED  [] BORDERLINE  [] HAS HEARING AID  [] USING HEARING AID  [] NOT USING HEARING AID  []     Urinary Incontinence Screen:  Episodes of urinary incontinence? :  [] YES  [] NO  [] N/A  [] WILL NEED FOLLOWUP  []       Depression Screen:      10/17/2023    11:26 AM    PHQ-2/PHQ-9 Depression Screening   Little Interest or Pleasure in Doing Things 0-->not at all   Feeling Down, Depressed or Hopeless 0-->not at all   PHQ-9: Brief Depression Severity Measure Score 0        PHQ-2:   [] 0 -      NOT DEPRESSED  [] 1 -      NOT DEPRESSED  [] 2 -      NOT DEPRESSED  [] 3-6 -  DEPRESSED (PROCEED TO PHQ-9)  [] N/A - HAS DEPRESSION AND IS ON TREATMENT  [] N/A - HAS DEPRESSION AND IS NOT ON TREATMENT    PHQ-9:   [] 0         NEGATIVE SCREENING FOR DEPRESSION  [] 1-4      MINIMAL DEPRESSION  [] 5-9      MILD DEPRESSIONNOT DEPRESSED  [] 10-14  MODERATE DEPRESSION  [] 15-19  MODERATELY SEVERE DEPRESSION  [] 20-27  SEVERE DEPRESSION    FUNCTIONAL, FALL RISK, & COGNITIVE SCREENING (Components below):    DATA:        10/17/2023    11:31 AM   Functional & Cognitive Status   Do you have difficulty preparing food and eating? No   Do you have difficulty bathing yourself, getting dressed or grooming yourself? No   Do you have difficulty using the toilet? No   Do you have difficulty moving around from place to place? No   Do you have trouble with steps or getting out of a bed or a chair? No   Current Diet Well Balanced Diet   Dental Exam Up to date   Eye Exam Up to date   Exercise (times per week) 7 times per week   Current Exercises Include Cardiovascular Workout;Weightlifting   Do you need help using the phone?  No   Are you deaf or do you have serious difficulty hearing?  No   Do you need help to go to places out of walking distance? No   Do you need help shopping? No   Do you need help preparing meals?  No   Do you need help with housework?  No   Do you need help with laundry? No   Do you need help taking your medications? No   Do you need help managing money? No   Do you ever drive or ride in a car without wearing a seat belt? No   Do you have difficulty concentrating, remembering or making decisions? No         A) Assessment of Functional Ability:  (Assessment of ability to perform ADL's  (showering/bathing, using toilet, dressing, feeding self, moving self around) and IADL's (use telephone, shop, prepare food, housekeep, do laundry, transport independently, take medications independently, and handle finances)    Degree of functional impairment: (based on assessment noted above)  [] NONE  [] MILD  [] MODERATE  [] SEVERE  [] VERY SEVERE  []     B) Assessment of Fall Risk:  [] LOW  [] MODERATE  [] HIGH  [] VERY HIGH     Need for further evaluation of gait, strength, and balance? :  [] YES  [] NO    Timed Up and Go (TUG):   (>= 12 seconds indicates high risk for falling)    Observable abnormalities included:  [] NORMAL GAIT   [] SLOW CAUTIOUS PACE  [] IMPAIRED BALANCE  [] SHORT STRIDES  [] MINIMAL ARM SWING  [] UNSTEADY (MILD)  [] UNSTEADY (MODERATE)  [] UNSTEADY (SEVERE)  [] SHUFFLING GAIT  [] USES ASSISTIVE DEVICE (CANE) PROPERLY  [] USES ASSISTIVE DEVICE (WALKER) PROPERLY  [] USES ASSISTIVE DEVICE (CANE) PROPERLY  [] DOES NOT USE ASSISTIVE DEVICE PROPERLY  [] IN WHEELCHAIR   [] NOT ABLE TO BE TESTED DUE TO SAFETY CONCERNS  []     C. Assessment of Cognitive Function:    Mini-Cog Test:     1) Registration (3 objects):     [] YES  [] NO   [] N/A HAS DOCUMENTED DEMENTIA     2) Number of objects recalled:   [] 3  [] 2  [] 1  [] 0     3) Clock Draw: Passed? :   [] YES  [] NO   [] N/A  [] N/A HAS DOCUMENTED DEMENTIA     Further evaluation required? :   [] YES  [] NO   [] CLOSE OBSERVATION NEEDED   [] SCHEDULE APPOINTMENT TO EVALUATE FURTHER   [] CONTINUE REGULAR FOLLOWUP AND MANAGEMENT   []     COUNSELING       A. Identification of Health Risk Factors:    Risk factors include: cardiovascular risk factors and depression / other psychiatric problems      B. Age-Appropriate Screening Schedule:  (Refer to the list below for future screening recommendations based on patient's age, sex and/or medical conditions. Orders for these recommended tests are listed in the plan section. The patient has been provided with  a written plan)    Health Maintenance Topics  Health Maintenance   Topic Date Due    BMI FOLLOWUP  Never done    ZOSTER VACCINE (3 of 3) 06/29/2020    INFLUENZA VACCINE  08/01/2023    COVID-19 Vaccine (5 - 2023-24 season) 09/01/2023    COLORECTAL CANCER SCREENING  03/22/2024    LIPID PANEL  07/13/2024    PROSTATE CANCER SCREENING  10/10/2024    ANNUAL WELLNESS VISIT  10/17/2024    TDAP/TD VACCINES (3 - Td or Tdap) 01/08/2029    HEPATITIS C SCREENING  Completed    Pneumococcal Vaccine 65+  Completed       Health Maintenance Topics Due or Over-Due  Health Maintenance Due   Topic Date Due    BMI FOLLOWUP  Never done    ZOSTER VACCINE (3 of 3) 06/29/2020    INFLUENZA VACCINE  08/01/2023    COVID-19 Vaccine (5 - 2023-24 season) 09/01/2023         C. Advanced Care Planning:    Advance Care Planning   ACP discussion was held with the patient during this visit. Patient has an advance directive (not in EMR), copy requested.       D. Patient Self-Management and Personalized Health Advice:    He has been provided with personalized counseling/information (including brochures/handouts) about:     -- optimizing diet/nutrition plans, weight management, reducing risk for cardiac/vascular events with pre-existing disease, mental health concerns (depression/anxiety), and managing depression/anxiety      He has been recommended for the following preventative services which has been performed today, will be ordered today or ordered/performed on upcoming follow-up visit:     -- NUTRITION counseling provided, CARDIOVASCULAR disease risk reduction counseling performed, FALL RISK assessment / plan of care completed, URINARY incontinence assessment done, PROSTATE CANCER screening (discussed and PSA ordered +/- CHELY performed), **COLORECTAL cancer screening (colonoscopy/Cologuard discussed or ordered), vaccination for INFLUENZA administered/ (or recommended), vaccination for SHINGRIX administered  (or recommended), COVID-19 vaccination  (and/or booster) recommendations provided, RSV vaccination recommended at pharmacy , DIABETES screening performed (current/reviewed labs/lab ordered)      E. Miscellaneous Items: [id number]    -Aspirin use counseling: Taking ASA appropriately as indicated    -Discussed BMI with him. The BMI is above average; BMI management plan is completed (discussed plans for weight loss)    -Reviewed use of high risk medication in the elderly: YES    -Reviewed for potential of harmful drug interactions in the elderly: YES        WRAP UP       Assessment & Plan:    1) MEDICARE ANNUAL WELLNESS VISIT    2) OTHER MEDICAL CONDITIONS ADDRESSED TODAY:            Problem List Items Addressed This Visit          High    Pure hypercholesterolemia (Chronic)    Overview     Goal LDL less than 70          Current Assessment & Plan     Lab Results   Component Value Date    LDL 99 07/13/2023    LDL 86 04/11/2023    LDL 88 10/10/2022    TRIG 79 07/13/2023    CHOLHDLRATIO 3.00 07/13/2023      Continue atorvastatin 40 mg daily. Goal LDL cholesterol < 70.          Relevant Medications    atorvastatin (LIPITOR) 40 MG tablet    Increased prostate specific antigen (PSA) velocity (Chronic)    Current Assessment & Plan     Lab Results   Component Value Date    PSA 3.5 10/10/2023    PSA 2.980 04/11/2023    PSA 2.2 04/08/2022      Increased PSA velocity. Advised him to see Dr. Gilliam regarding this issue along with inguinal hernia. He states he knows him personally and will be sure to see him formally for evaluation.             Medium    Irritability (Chronic)    Overview     Continue sertraline 50 mg         Relevant Medications    sertraline (ZOLOFT) 50 MG tablet    Prediabetes (Chronic)    Overview     Continue metformin  mg daily         Relevant Medications    metFORMIN ER (GLUCOPHAGE-XR) 500 MG 24 hr tablet    Coronary artery calcification seen on CT scan (Chronic)    Overview     4/2021 : noted CT coronary calcium score (extensive  atherosclerotic burden of RCA and LAD)    Continue ASA 81 mg qd and atorvastatin 40 mg qd.          Relevant Medications    aspirin 81 MG EC tablet    atorvastatin (LIPITOR) 40 MG tablet       Unprioritized    Left inguinal hernia     Other Visit Diagnoses       Medicare annual wellness visit, subsequent    -  Primary                    No orders of the defined types were placed in this encounter.      Discussion / Summary:        Medications as of TODAY:              Current Outpatient Medications   Medication Sig Dispense Refill    amoxicillin-clavulanate (AUGMENTIN) 875-125 MG per tablet Take 1 tablet by mouth 2 (Two) Times a Day for 7 days. TAKE WITH FOOD 14 tablet 0    aspirin 81 MG EC tablet Take 1 tablet by mouth Daily.      atorvastatin (LIPITOR) 40 MG tablet Take 1 tablet by mouth Daily. 90 tablet 1    fexofenadine (ALLEGRA) 180 MG tablet Take 1 tablet by mouth Daily.      metFORMIN ER (GLUCOPHAGE-XR) 500 MG 24 hr tablet TAKE ONE TABLET BY MOUTH DAILY WITH DINNER. 90 tablet 1    methylPREDNISolone (MEDROL) 4 MG dose pack Take as directed on package instructions. 21 tablet 0    pantoprazole (PROTONIX) 40 MG EC tablet TAKE ONE TABLET BY MOUTH DAILY 90 tablet 3    sertraline (ZOLOFT) 50 MG tablet TAKE ONE TABLET BY MOUTH DAILY 90 tablet 1    Triamcinolone Acetonide (NASACORT) 55 MCG/ACT nasal inhaler 2 sprays into the nostril(s) as directed by provider Daily.       No current facility-administered medications for this visit.         FOLLOW-UP:            Return in about 6 months (around 4/17/2024) for Reassess chronic medical problems, **SCHEDULE COMB AWV/MED FU FOR NEXT YR (30 MIN)**.              Future Appointments   Date Time Provider Department Center   4/18/2024  7:45 AM Christiano Dewey MD [facility]   7/26/2024  8:30 AM Celine Levine DNP, APRN [facility] None   10/21/2024  8:00 AM Christiano Dewey MD [facility]           After Visit Summary (AVS) including the Personalized Prevention  Plan  Services (PPPS) was either printed and given to the patient at check-out today and/or sent to [facility] for review.

## 2023-10-17 NOTE — PATIENT INSTRUCTIONS
** IMPORTANT MESSAGE FROM DR. HOOD **    In our office, your satisfaction is VERY important to us.     You may receive a survey from [facility] by mail or E-mail for you to provide feedback about your visit. This information is invaluable for me to know what we can do to improve our services.     I ask that you please take a few minutes to complete the survey and let us know how we are doing in serving your needs. (You may receive the survey more than once for multiple visits)    Thank You !    Dr. Hood    _________________________________________________________________________________________________________________________      ** ADDITIONAL INSTRUCTION / REMINDERS FROM DR. HOOD **    Flu shot (high dose), COVID-19, RSV (Arexvy) at pharmacy.

## 2023-10-31 ENCOUNTER — OFFICE VISIT (OUTPATIENT)
Dept: SURGERY | Facility: CLINIC | Age: 68
End: 2023-10-31
Payer: MEDICARE

## 2023-10-31 ENCOUNTER — PREP FOR SURGERY (OUTPATIENT)
Dept: OTHER | Facility: HOSPITAL | Age: 68
End: 2023-10-31
Payer: MEDICARE

## 2023-10-31 VITALS
HEIGHT: 72 IN | WEIGHT: 204 LBS | SYSTOLIC BLOOD PRESSURE: 110 MMHG | DIASTOLIC BLOOD PRESSURE: 68 MMHG | BODY MASS INDEX: 27.63 KG/M2

## 2023-10-31 DIAGNOSIS — K40.90 LEFT INGUINAL HERNIA: Primary | ICD-10-CM

## 2023-10-31 PROCEDURE — 1160F RVW MEDS BY RX/DR IN RCRD: CPT | Performed by: SURGERY

## 2023-10-31 PROCEDURE — 1159F MED LIST DOCD IN RCRD: CPT | Performed by: SURGERY

## 2023-10-31 PROCEDURE — 99203 OFFICE O/P NEW LOW 30 MIN: CPT | Performed by: SURGERY

## 2023-10-31 RX ORDER — CEFAZOLIN SODIUM 2 G/100ML
2000 INJECTION, SOLUTION INTRAVENOUS ONCE
OUTPATIENT
Start: 2023-10-31

## 2023-10-31 NOTE — H&P (VIEW-ONLY)
General Surgery Initial Office Visit    Referring Provider: Self Referring    Chief Complaint:    Left inguinal hernia    History of Present Illness:    Luca Carolina Jr. is a very pleasant 68 y.o. male who presents with a left inguinal hernia.  He reports noticing it about 3 weeks ago after [place]  He noticed a bulge in the area that was smaller than a golf ball.  This reduced spontaneously.  Since then, he has had discomfort in the left groin intermittently.  He denies obstructive symptoms, difficulty urinating, changes in bowel habits.  He does not report noticing any pain or bulge on the right.  He denies any chest pain or shortness of breath and he reports he is very active.    Past Medical History:   ADD  Allergies  Hyperlipidemia  BPH with elevated PSA  Prediabetes  Coronary artery calcification on screening CT    Past Surgical History:    Colonoscopy, last in 2019 where a single sigmoid polyp was found  Hemorrhoidectomy  Sinus surgery  Left total hip arthroplasty 2021    Family History:    Mother-dementia  Father-aortic stenosis  Denies family history of colon cancer or prostate cancer    Social History:    , wife's name is Stacie  Denies tobacco use  Occasional alcohol use    Allergies:   No Known Allergies    Medications:     Current Outpatient Medications:     aspirin 81 MG EC tablet, Take 1 tablet by mouth Daily., Disp: , Rfl:     atorvastatin (LIPITOR) 40 MG tablet, Take 1 tablet by mouth Daily., Disp: 90 tablet, Rfl: 1    fexofenadine (ALLEGRA) 180 MG tablet, Take 1 tablet by mouth Daily., Disp: , Rfl:     metFORMIN ER (GLUCOPHAGE-XR) 500 MG 24 hr tablet, TAKE ONE TABLET BY MOUTH DAILY WITH DINNER., Disp: 90 tablet, Rfl: 1    pantoprazole (PROTONIX) 40 MG EC tablet, TAKE ONE TABLET BY MOUTH DAILY, Disp: 90 tablet, Rfl: 3    sertraline (ZOLOFT) 50 MG tablet, TAKE ONE TABLET BY MOUTH DAILY, Disp: 90 tablet, Rfl: 1    Triamcinolone Acetonide (NASACORT) 55 MCG/ACT nasal inhaler, 2  sprays into the nostril(s) as directed by provider Daily., Disp: , Rfl:     Radiology/Endoscopy:    No pertinent radiology to review    Labs:    Last hemoglobin A1c was 5.8  Most recent chemistry in 2023 was normal    Review of Systems:   Influenza-like illness: no fever, no  cough, no  sore throat, no  body aches, no loss of sense of taste or smell, no known exposure to person with Covid-19.  Constitutional: Negative for fevers or chills  HENT: Negative for hearing loss or runny nose  Eyes: Negative for vision changes or scleral icterus  Respiratory: Negative for cough or shortness of breath  Cardiovascular: Negative for chest pain or heart palpitations  Gastrointestinal: Negative for abdominal pain, nausea, vomiting, constipation, melena, or hematochezia  Genitourinary: Negative for hematuria or dysuria  Musculoskeletal: Negative for joint swelling or gait instability  Neurologic: Negative for tremors or seizures  Psychiatric: Negative for suicidal ideations or depression  All other systems reviewed and negative    Physical Exam:   Body mass index is 27.67 kg/m².  Constitutional: Well-developed well-nourished, no acute distress  Eyes: Conjunctiva normal, sclera nonicteric  ENMT: Hearing grossly normal, oral mucosa moist  Neck: Supple, trachea midline  Respiratory: No increased work of breathing, normal inspiratory effort  Cardiovascular: Regular rate, no peripheral edema, no jugular venous distention  Gastrointestinal: Soft, nontender, there is a palpable left inguinal hernia which is easily reducible, and feels like it is an indirect hernia.  No hernia defect felt on the right side, although there might be a bit of floor weakening in the right inguinal canal  Lymphatics (palpable nodes):  cervical-negative, axillary-negative  Skin:  Warm, dry, no rash on visualized skin surfaces  Musculoskeletal: Symmetric strength, normal gait  Psychiatric: Alert and oriented ×3, normal affect     Assessment/Plan:  1.  Left  inguinal hernia without obstruction or incarceration  Patient stated he would like to have the hernia fixed as soon as possible.  I explained the procedure (da Grant assisted laparoscopic left, possible bilateral inguinal hernia repair with mesh) in detail.  All questions were sought and answered.  All risks (including bleeding, infection, damage to surrounding structures such as the spermatic vessels, vas deferens, nerves arteries and veins in the pelvis), benefits, and alternatives were explained to the patient who agreed and wished to proceed.      KAMALJIT MEDEROS M.D.  General, Robotic, and Endoscopic Surgery  [facility]    [address] [place] [address], [address]  [place], [place], [address]  P: [phone]  F: [phone]

## 2023-10-31 NOTE — PROGRESS NOTES
General Surgery Initial Office Visit    Referring Provider: Self Referring    Chief Complaint:    Left inguinal hernia    History of Present Illness:    Luca Carolina Jr. is a very pleasant 68 y.o. male who presents with a left inguinal hernia.  He reports noticing it about 3 weeks ago after [place]  He noticed a bulge in the area that was smaller than a golf ball.  This reduced spontaneously.  Since then, he has had discomfort in the left groin intermittently.  He denies obstructive symptoms, difficulty urinating, changes in bowel habits.  He does not report noticing any pain or bulge on the right.  He denies any chest pain or shortness of breath and he reports he is very active.    Past Medical History:   ADD  Allergies  Hyperlipidemia  BPH with elevated PSA  Prediabetes  Coronary artery calcification on screening CT    Past Surgical History:    Colonoscopy, last in 2019 where a single sigmoid polyp was found  Hemorrhoidectomy  Sinus surgery  Left total hip arthroplasty 2021    Family History:    Mother-dementia  Father-aortic stenosis  Denies family history of colon cancer or prostate cancer    Social History:    , wife's name is Stacie  Denies tobacco use  Occasional alcohol use    Allergies:   No Known Allergies    Medications:     Current Outpatient Medications:     aspirin 81 MG EC tablet, Take 1 tablet by mouth Daily., Disp: , Rfl:     atorvastatin (LIPITOR) 40 MG tablet, Take 1 tablet by mouth Daily., Disp: 90 tablet, Rfl: 1    fexofenadine (ALLEGRA) 180 MG tablet, Take 1 tablet by mouth Daily., Disp: , Rfl:     metFORMIN ER (GLUCOPHAGE-XR) 500 MG 24 hr tablet, TAKE ONE TABLET BY MOUTH DAILY WITH DINNER., Disp: 90 tablet, Rfl: 1    pantoprazole (PROTONIX) 40 MG EC tablet, TAKE ONE TABLET BY MOUTH DAILY, Disp: 90 tablet, Rfl: 3    sertraline (ZOLOFT) 50 MG tablet, TAKE ONE TABLET BY MOUTH DAILY, Disp: 90 tablet, Rfl: 1    Triamcinolone Acetonide (NASACORT) 55 MCG/ACT nasal inhaler, 2  sprays into the nostril(s) as directed by provider Daily., Disp: , Rfl:     Radiology/Endoscopy:    No pertinent radiology to review    Labs:    Last hemoglobin A1c was 5.8  Most recent chemistry in 2023 was normal    Review of Systems:   Influenza-like illness: no fever, no  cough, no  sore throat, no  body aches, no loss of sense of taste or smell, no known exposure to person with Covid-19.  Constitutional: Negative for fevers or chills  HENT: Negative for hearing loss or runny nose  Eyes: Negative for vision changes or scleral icterus  Respiratory: Negative for cough or shortness of breath  Cardiovascular: Negative for chest pain or heart palpitations  Gastrointestinal: Negative for abdominal pain, nausea, vomiting, constipation, melena, or hematochezia  Genitourinary: Negative for hematuria or dysuria  Musculoskeletal: Negative for joint swelling or gait instability  Neurologic: Negative for tremors or seizures  Psychiatric: Negative for suicidal ideations or depression  All other systems reviewed and negative    Physical Exam:   Body mass index is 27.67 kg/m².  Constitutional: Well-developed well-nourished, no acute distress  Eyes: Conjunctiva normal, sclera nonicteric  ENMT: Hearing grossly normal, oral mucosa moist  Neck: Supple, trachea midline  Respiratory: No increased work of breathing, normal inspiratory effort  Cardiovascular: Regular rate, no peripheral edema, no jugular venous distention  Gastrointestinal: Soft, nontender, there is a palpable left inguinal hernia which is easily reducible, and feels like it is an indirect hernia.  No hernia defect felt on the right side, although there might be a bit of floor weakening in the right inguinal canal  Lymphatics (palpable nodes):  cervical-negative, axillary-negative  Skin:  Warm, dry, no rash on visualized skin surfaces  Musculoskeletal: Symmetric strength, normal gait  Psychiatric: Alert and oriented ×3, normal affect     Assessment/Plan:  1.  Left  inguinal hernia without obstruction or incarceration  Patient stated he would like to have the hernia fixed as soon as possible.  I explained the procedure (da Grant assisted laparoscopic left, possible bilateral inguinal hernia repair with mesh) in detail.  All questions were sought and answered.  All risks (including bleeding, infection, damage to surrounding structures such as the spermatic vessels, vas deferens, nerves arteries and veins in the pelvis), benefits, and alternatives were explained to the patient who agreed and wished to proceed.      KAMALJIT MEDEROS M.D.  General, Robotic, and Endoscopic Surgery  [facility]    [address] [place] [address], [address]  [place], [place], [address]  P: [phone]  F: [phone]

## 2023-11-20 ENCOUNTER — PRE-ADMISSION TESTING (OUTPATIENT)
Dept: PREADMISSION TESTING | Facility: HOSPITAL | Age: 68
End: 2023-11-20
Payer: MEDICARE

## 2023-11-20 VITALS
RESPIRATION RATE: 16 BRPM | HEIGHT: 72 IN | BODY MASS INDEX: 27.5 KG/M2 | WEIGHT: 203 LBS | TEMPERATURE: 98.4 F | HEART RATE: 57 BPM | DIASTOLIC BLOOD PRESSURE: 77 MMHG | SYSTOLIC BLOOD PRESSURE: 135 MMHG | OXYGEN SATURATION: 98 %

## 2023-11-20 DIAGNOSIS — K40.90 LEFT INGUINAL HERNIA: ICD-10-CM

## 2023-11-20 LAB
ANION GAP SERPL CALCULATED.3IONS-SCNC: 8.4 MMOL/L (ref 5–15)
BUN SERPL-MCNC: 17 MG/DL (ref 8–23)
BUN/CREAT SERPL: 21 (ref 7–25)
CALCIUM SPEC-SCNC: 9.6 MG/DL (ref 8.6–10.5)
CHLORIDE SERPL-SCNC: 104 MMOL/L (ref 98–107)
CO2 SERPL-SCNC: 27.6 MMOL/L (ref 22–29)
CREAT SERPL-MCNC: 0.81 MG/DL (ref 0.76–1.27)
DEPRECATED RDW RBC AUTO: 44 FL (ref 37–54)
EGFRCR SERPLBLD CKD-EPI 2021: 96 ML/MIN/1.73
ERYTHROCYTE [DISTWIDTH] IN BLOOD BY AUTOMATED COUNT: 12.5 % (ref 12.3–15.4)
GLUCOSE SERPL-MCNC: 95 MG/DL (ref 65–99)
HCT VFR BLD AUTO: 39.8 % (ref 37.5–51)
HGB BLD-MCNC: 13.5 G/DL (ref 13–17.7)
MCH RBC QN AUTO: 32.5 PG (ref 26.6–33)
MCHC RBC AUTO-ENTMCNC: 33.9 G/DL (ref 31.5–35.7)
MCV RBC AUTO: 95.7 FL (ref 79–97)
PLATELET # BLD AUTO: 148 10*3/MM3 (ref 140–450)
PMV BLD AUTO: 9.1 FL (ref 6–12)
POTASSIUM SERPL-SCNC: 4.5 MMOL/L (ref 3.5–5.2)
QT INTERVAL: 406 MS
QTC INTERVAL: 389 MS
RBC # BLD AUTO: 4.16 10*6/MM3 (ref 4.14–5.8)
SODIUM SERPL-SCNC: 140 MMOL/L (ref 136–145)
WBC NRBC COR # BLD AUTO: 4.51 10*3/MM3 (ref 3.4–10.8)

## 2023-11-20 PROCEDURE — 36415 COLL VENOUS BLD VENIPUNCTURE: CPT

## 2023-11-20 PROCEDURE — 85027 COMPLETE CBC AUTOMATED: CPT

## 2023-11-20 PROCEDURE — 93005 ELECTROCARDIOGRAM TRACING: CPT

## 2023-11-20 PROCEDURE — 80048 BASIC METABOLIC PNL TOTAL CA: CPT

## 2023-11-20 RX ORDER — IBUPROFEN 200 MG
200 TABLET ORAL EVERY 6 HOURS PRN
COMMUNITY

## 2023-11-20 NOTE — DISCHARGE INSTRUCTIONS
Anesthesia Post-op Note    Patient: Abran Pineda  Procedure(s) Performed: COLONOSCOPY WITH POLYPECTOMY (RT NEEDED)  Anesthesia type: MAC    Vitals Value Taken Time   Temp 35.9 °C (96.7 °F) 11/20/23 1255   Pulse 62 11/20/23 1255   Resp 18 11/20/23 1255   SpO2 96 % 11/20/23 1255   /93 11/20/23 1255         Patient Location: PACU Phase 1  Post-op Vital Signs:stable  Level of Consciousness: awake and alert  Respiratory Status: spontaneous ventilation  Cardiovascular stable  Hydration: euvolemic  Pain Management: adequately controlled  Handoff: Handoff to receiving clinician was performed and questions were answered  Vomiting: none  Nausea: None  Airway Patency:patent  Post-op Assessment: no complications and patient tolerated procedure well      There were no known notable events for this encounter.                     Take the following medications the morning of surgery:    ZOLOFT AND PROTONIX    ARRIVE AT 5:30      If you are on prescription narcotic pain medication to control your pain you may also take that medication the morning of surgery.    General Instructions:  Do not eat solid food after midnight the night before surgery.  You may drink clear liquids day of surgery but must stop at least one hour before your hospital arrival time.  It is beneficial for you to have a clear drink that contains carbohydrates the day of surgery.  We suggest a 12 to 20 ounce bottle of Gatorade or Powerade for non-diabetic patients or a 12 to 20 ounce bottle of G2 or Powerade Zero for diabetic patients. (Pediatric patients, are not advised to drink a 12 to 20 ounce carbohydrate drink)    Clear liquids are liquids you can see through.  Nothing red in color.     Plain water                               Sports drinks  Sodas                                   Gelatin (Jell-O)  Fruit juices without pulp such as white grape juice and apple juice  Popsicles that contain no fruit or yogurt  Tea or coffee (no cream or milk added)  Gatorade / Powerade  G2 / Powerade Zero    Infants may have breast milk up to four hours before surgery.  Infants drinking formula may drink formula up to six hours before surgery.   Patients who avoid smoking, chewing tobacco and alcohol for 4 weeks prior to surgery have a reduced risk of post-operative complications.  Quit smoking as many days before surgery as you can.  Do not smoke, use chewing tobacco or drink alcohol the day of surgery.   If applicable bring your C-PAP/ BI-PAP machine in with you to preop day of surgery.  Bring any papers given to you in the doctor’s office.  Wear clean comfortable clothes.  Do not wear contact lenses, false eyelashes or make-up.  Bring a case for your glasses.   Bring crutches or walker if applicable.  Remove all piercings.  Leave jewelry and any other valuables at home.  Hair  extensions with metal clips must be removed prior to surgery.  The Pre-Admission Testing nurse will instruct you to bring medications if unable to obtain an accurate list in Pre-Admission Testing.          Preventing a Surgical Site Infection:  For 2 to 3 days before surgery, avoid shaving with a razor because the razor can irritate skin and make it easier to develop an infection.    Any areas of open skin can increase the risk of a post-operative wound infection by allowing bacteria to enter and travel throughout the body.  Notify your surgeon if you have any skin wounds / rashes even if it is not near the expected surgical site.  The area will need assessed to determine if surgery should be delayed until it is healed.  The night prior to surgery shower using a fresh bar of anti-bacterial soap (such as Dial) and clean washcloth.  Sleep in a clean bed with clean clothing.  Do not allow pets to sleep with you.  Shower on the morning of surgery using a fresh bar of anti-bacterial soap (such as Dial) and clean washcloth.  Dry with a clean towel and dress in clean clothing.  Ask your surgeon if you will be receiving antibiotics prior to surgery.  Make sure you, your family, and all healthcare providers clean their hands with soap and water or an alcohol based hand  before caring for you or your wound.    Day of surgery:  Your arrival time is approximately two hours before your scheduled surgery time.  Upon arrival, a Pre-op nurse and Anesthesiologist will review your health history, obtain vital signs, and answer questions you may have.  The only belongings needed at this time will be a list of your home medications and if applicable your C-PAP/BI-PAP machine.  A Pre-op nurse will start an IV and you may receive medication in preparation for surgery, including something to help you relax.     Please be aware that surgery does come with discomfort.  We want to make every effort to control your discomfort so  please discuss any uncontrolled symptoms with your nurse.   Your doctor will most likely have prescribed pain medications.      If you are going home after surgery you will receive individualized written care instructions before being discharged.  A responsible adult must drive you to and from the hospital on the day of your surgery and stay with you for 24 hours.  Discharge prescriptions can be filled by the hospital pharmacy during regular pharmacy hours.  If you are having surgery late in the day/evening your prescription may be e-prescribed to your pharmacy.  Please verify your pharmacy hours or chose a 24 hour pharmacy to avoid not having access to your prescription because your pharmacy has closed for the day.    If you are staying overnight following surgery, you will be transported to your hospital room following the recovery period.  [facility] has all private rooms.    If you have any questions please call Pre-Admission Testing at [phone].  Deductibles and co-payments are collected on the day of service. Please be prepared to pay the required co-pay, deductible or deposit on the day of service as defined by your plan.    Call your surgeon immediately if you experience any of the following symptoms:  Sore Throat  Shortness of Breath or difficulty breathing  Cough  Chills  Body soreness or muscle pain  Headache  Fever  New loss of taste or smell  Do not arrive for your surgery ill.  Your procedure will need to be rescheduled to another time.  You will need to call your physician before the day of surgery to avoid any unnecessary exposure to hospital staff as well as other patients.  CHLORHEXIDINE CLOTH INSTRUCTIONS  The morning of surgery follow these instructions using the Chlorhexidine cloths you've been given.  These steps reduce bacteria on the body.  Do not use the cloths near your eyes, ears mouth, genitalia or on open wounds.  Throw the cloths away after use but do not try to  flush them down a toilet.      Open and remove one cloth at a time from the package.    Leave the cloth unfolded and begin the bathing.  Massage the skin with the cloths using gentle pressure to remove bacteria.  Do not scrub harshly.   Follow the steps below with one 2% CHG cloth per area (6 total cloths).  One cloth for neck, shoulders and chest.  One cloth for both arms, hands, fingers and underarms (do underarms last).  One cloth for the abdomen followed by groin.  One cloth for right leg and foot including between the toes.  One cloth for left leg and foot including between the toes.  The last cloth is to be used for the back of the neck, back and buttocks.    Allow the CHG to air dry 3 minutes on the skin which will give it time to work and decrease the chance of irritation.  The skin may feel sticky until it is dry.  Do not rinse with water or any other liquid or you will lose the beneficial effects of the CHG.  If mild skin irritation occurs, do rinse the skin to remove the CHG.  Report this to the nurse at time of admission.  Do not apply lotions, creams, ointments, deodorants or perfumes after using the clothes. Dress in clean clothes before coming to the hospital.

## 2023-11-30 ENCOUNTER — ANESTHESIA (OUTPATIENT)
Dept: PERIOP | Facility: HOSPITAL | Age: 68
End: 2023-11-30
Payer: MEDICARE

## 2023-11-30 ENCOUNTER — TELEPHONE (OUTPATIENT)
Dept: SURGERY | Facility: CLINIC | Age: 68
End: 2023-11-30
Payer: MEDICARE

## 2023-11-30 ENCOUNTER — ANESTHESIA EVENT (OUTPATIENT)
Dept: PERIOP | Facility: HOSPITAL | Age: 68
End: 2023-11-30
Payer: MEDICARE

## 2023-11-30 ENCOUNTER — HOSPITAL ENCOUNTER (OUTPATIENT)
Facility: HOSPITAL | Age: 68
Setting detail: HOSPITAL OUTPATIENT SURGERY
Discharge: HOME | End: 2023-11-30
Attending: SURGERY | Admitting: SURGERY
Payer: MEDICARE

## 2023-11-30 VITALS
TEMPERATURE: 98 F | DIASTOLIC BLOOD PRESSURE: 75 MMHG | SYSTOLIC BLOOD PRESSURE: 124 MMHG | HEART RATE: 68 BPM | RESPIRATION RATE: 16 BRPM | OXYGEN SATURATION: 90 %

## 2023-11-30 DIAGNOSIS — K40.20 NON-RECURRENT BILATERAL INGUINAL HERNIA WITHOUT OBSTRUCTION OR GANGRENE: Primary | ICD-10-CM

## 2023-11-30 DIAGNOSIS — K40.90 LEFT INGUINAL HERNIA: ICD-10-CM

## 2023-11-30 LAB
GLUCOSE BLDC GLUCOMTR-MCNC: 112 MG/DL (ref 70–130)
GLUCOSE BLDC GLUCOMTR-MCNC: 131 MG/DL (ref 70–130)

## 2023-11-30 PROCEDURE — C1781 MESH (IMPLANTABLE): HCPCS | Performed by: SURGERY

## 2023-11-30 PROCEDURE — 25010000002 MAGNESIUM SULFATE PER 500 MG OF MAGNESIUM: Performed by: STUDENT IN AN ORGANIZED HEALTH CARE EDUCATION/TRAINING PROGRAM

## 2023-11-30 PROCEDURE — 25010000002 CEFAZOLIN IN DEXTROSE 2-4 GM/100ML-% SOLUTION: Performed by: SURGERY

## 2023-11-30 PROCEDURE — 25010000002 DEXAMETHASONE SODIUM PHOSPHATE 20 MG/5ML SOLUTION: Performed by: STUDENT IN AN ORGANIZED HEALTH CARE EDUCATION/TRAINING PROGRAM

## 2023-11-30 PROCEDURE — 25010000002 MIDAZOLAM PER 1 MG: Performed by: STUDENT IN AN ORGANIZED HEALTH CARE EDUCATION/TRAINING PROGRAM

## 2023-11-30 PROCEDURE — 82948 REAGENT STRIP/BLOOD GLUCOSE: CPT

## 2023-11-30 PROCEDURE — 25810000003 LACTATED RINGERS PER 1000 ML: Performed by: ANESTHESIOLOGY

## 2023-11-30 PROCEDURE — 25010000002 FENTANYL CITRATE (PF) 50 MCG/ML SOLUTION: Performed by: STUDENT IN AN ORGANIZED HEALTH CARE EDUCATION/TRAINING PROGRAM

## 2023-11-30 PROCEDURE — 25010000002 HYDROMORPHONE 1 MG/ML SOLUTION: Performed by: STUDENT IN AN ORGANIZED HEALTH CARE EDUCATION/TRAINING PROGRAM

## 2023-11-30 PROCEDURE — 25010000002 PROPOFOL 200 MG/20ML EMULSION: Performed by: STUDENT IN AN ORGANIZED HEALTH CARE EDUCATION/TRAINING PROGRAM

## 2023-11-30 PROCEDURE — 25010000002 SUGAMMADEX 200 MG/2ML SOLUTION: Performed by: STUDENT IN AN ORGANIZED HEALTH CARE EDUCATION/TRAINING PROGRAM

## 2023-11-30 PROCEDURE — 25010000002 ONDANSETRON PER 1 MG: Performed by: STUDENT IN AN ORGANIZED HEALTH CARE EDUCATION/TRAINING PROGRAM

## 2023-11-30 PROCEDURE — 25810000003 LACTATED RINGERS PER 1000 ML: Performed by: STUDENT IN AN ORGANIZED HEALTH CARE EDUCATION/TRAINING PROGRAM

## 2023-11-30 DEVICE — 3DMAX™ MID ANATOMICAL MESH, XL, LEFT, 5” X 7”, 12 X 17 CM
Type: IMPLANTABLE DEVICE | Site: GROIN | Status: FUNCTIONAL
Brand: 3DMAX™ MID ANATOMICAL MESH

## 2023-11-30 DEVICE — 3DMAX™ MID ANATOMICAL MESH, XL, RIGHT, 5” X 7”, 12 X 17 CM
Type: IMPLANTABLE DEVICE | Site: GROIN | Status: FUNCTIONAL
Brand: 3DMAX™ MID ANATOMICAL MESH

## 2023-11-30 DEVICE — KNOTLESS TISSUE CONTROL DEVICE, UNDYED UNIDIRECTIONAL (ANTIBACTERIAL) SYNTHETIC ABSORBABLE DEVICE
Type: IMPLANTABLE DEVICE | Site: GROIN | Status: FUNCTIONAL
Brand: STRATAFIX

## 2023-11-30 RX ORDER — SODIUM CHLORIDE 0.9 % (FLUSH) 0.9 %
3 SYRINGE (ML) INJECTION EVERY 12 HOURS SCHEDULED
Status: DISCONTINUED | OUTPATIENT
Start: 2023-11-30 | End: 2023-11-30 | Stop reason: HOSPADM

## 2023-11-30 RX ORDER — ONDANSETRON 4 MG/1
4 TABLET, FILM COATED ORAL EVERY 8 HOURS PRN
Qty: 20 TABLET | Refills: 0 | Status: SHIPPED | OUTPATIENT
Start: 2023-11-30 | End: 2023-12-07

## 2023-11-30 RX ORDER — SODIUM CHLORIDE, SODIUM LACTATE, POTASSIUM CHLORIDE, CALCIUM CHLORIDE 600; 310; 30; 20 MG/100ML; MG/100ML; MG/100ML; MG/100ML
1000 INJECTION, SOLUTION INTRAVENOUS CONTINUOUS
Status: DISCONTINUED | OUTPATIENT
Start: 2023-11-30 | End: 2023-11-30 | Stop reason: HOSPADM

## 2023-11-30 RX ORDER — HYDRALAZINE HYDROCHLORIDE 20 MG/ML
5 INJECTION INTRAMUSCULAR; INTRAVENOUS
Status: DISCONTINUED | OUTPATIENT
Start: 2023-11-30 | End: 2023-11-30 | Stop reason: HOSPADM

## 2023-11-30 RX ORDER — PHENYLEPHRINE HCL IN 0.9% NACL 1 MG/10 ML
SYRINGE (ML) INTRAVENOUS AS NEEDED
Status: DISCONTINUED | OUTPATIENT
Start: 2023-11-30 | End: 2023-11-30 | Stop reason: SURG

## 2023-11-30 RX ORDER — LIDOCAINE HYDROCHLORIDE 10 MG/ML
0.5 INJECTION, SOLUTION INFILTRATION; PERINEURAL ONCE AS NEEDED
Status: DISCONTINUED | OUTPATIENT
Start: 2023-11-30 | End: 2023-11-30 | Stop reason: HOSPADM

## 2023-11-30 RX ORDER — DEXAMETHASONE SODIUM PHOSPHATE 4 MG/ML
INJECTION, SOLUTION INTRA-ARTICULAR; INTRALESIONAL; INTRAMUSCULAR; INTRAVENOUS; SOFT TISSUE AS NEEDED
Status: DISCONTINUED | OUTPATIENT
Start: 2023-11-30 | End: 2023-11-30 | Stop reason: SURG

## 2023-11-30 RX ORDER — OXYCODONE AND ACETAMINOPHEN 7.5; 325 MG/1; MG/1
1 TABLET ORAL EVERY 4 HOURS PRN
Status: DISCONTINUED | OUTPATIENT
Start: 2023-11-30 | End: 2023-11-30 | Stop reason: HOSPADM

## 2023-11-30 RX ORDER — CEFAZOLIN SODIUM 2 G/100ML
2000 INJECTION, SOLUTION INTRAVENOUS ONCE
Status: COMPLETED | OUTPATIENT
Start: 2023-11-30 | End: 2023-11-30

## 2023-11-30 RX ORDER — CELECOXIB 200 MG/1
200 CAPSULE ORAL 2 TIMES DAILY
Qty: 14 CAPSULE | Refills: 0 | Status: SHIPPED | OUTPATIENT
Start: 2023-11-30 | End: 2023-12-04

## 2023-11-30 RX ORDER — PROPOFOL 10 MG/ML
INJECTION, EMULSION INTRAVENOUS AS NEEDED
Status: DISCONTINUED | OUTPATIENT
Start: 2023-11-30 | End: 2023-11-30 | Stop reason: SURG

## 2023-11-30 RX ORDER — FENTANYL CITRATE 50 UG/ML
50 INJECTION, SOLUTION INTRAMUSCULAR; INTRAVENOUS ONCE AS NEEDED
Status: DISCONTINUED | OUTPATIENT
Start: 2023-11-30 | End: 2023-11-30 | Stop reason: HOSPADM

## 2023-11-30 RX ORDER — ACETAMINOPHEN 500 MG
500 TABLET ORAL ONCE
Status: COMPLETED | OUTPATIENT
Start: 2023-11-30 | End: 2023-11-30

## 2023-11-30 RX ORDER — ACETAMINOPHEN 500 MG
1000 TABLET ORAL ONCE
Status: COMPLETED | OUTPATIENT
Start: 2023-11-30 | End: 2023-11-30

## 2023-11-30 RX ORDER — MAGNESIUM HYDROXIDE 1200 MG/15ML
LIQUID ORAL AS NEEDED
Status: DISCONTINUED | OUTPATIENT
Start: 2023-11-30 | End: 2023-11-30 | Stop reason: HOSPADM

## 2023-11-30 RX ORDER — FENTANYL CITRATE 50 UG/ML
50 INJECTION, SOLUTION INTRAMUSCULAR; INTRAVENOUS
Status: DISCONTINUED | OUTPATIENT
Start: 2023-11-30 | End: 2023-11-30 | Stop reason: HOSPADM

## 2023-11-30 RX ORDER — SODIUM CHLORIDE 0.9 % (FLUSH) 0.9 %
10 SYRINGE (ML) INJECTION AS NEEDED
Status: DISCONTINUED | OUTPATIENT
Start: 2023-11-30 | End: 2023-11-30 | Stop reason: HOSPADM

## 2023-11-30 RX ORDER — OXYCODONE HYDROCHLORIDE 5 MG/1
5 TABLET ORAL EVERY 6 HOURS PRN
Qty: 5 TABLET | Refills: 0 | Status: SHIPPED | OUTPATIENT
Start: 2023-11-30 | End: 2023-12-07

## 2023-11-30 RX ORDER — IPRATROPIUM BROMIDE AND ALBUTEROL SULFATE 2.5; .5 MG/3ML; MG/3ML
3 SOLUTION RESPIRATORY (INHALATION) ONCE AS NEEDED
Status: DISCONTINUED | OUTPATIENT
Start: 2023-11-30 | End: 2023-11-30 | Stop reason: HOSPADM

## 2023-11-30 RX ORDER — NALOXONE HCL 0.4 MG/ML
0.2 VIAL (ML) INJECTION AS NEEDED
Status: DISCONTINUED | OUTPATIENT
Start: 2023-11-30 | End: 2023-11-30 | Stop reason: HOSPADM

## 2023-11-30 RX ORDER — BUPIVACAINE HYDROCHLORIDE AND EPINEPHRINE 5; 5 MG/ML; UG/ML
INJECTION, SOLUTION EPIDURAL; INTRACAUDAL; PERINEURAL AS NEEDED
Status: DISCONTINUED | OUTPATIENT
Start: 2023-11-30 | End: 2023-11-30 | Stop reason: HOSPADM

## 2023-11-30 RX ORDER — GLYCOPYRROLATE 0.2 MG/ML
INJECTION INTRAMUSCULAR; INTRAVENOUS AS NEEDED
Status: DISCONTINUED | OUTPATIENT
Start: 2023-11-30 | End: 2023-11-30 | Stop reason: SURG

## 2023-11-30 RX ORDER — EPHEDRINE SULFATE 50 MG/ML
5 INJECTION, SOLUTION INTRAVENOUS ONCE AS NEEDED
Status: DISCONTINUED | OUTPATIENT
Start: 2023-11-30 | End: 2023-11-30 | Stop reason: HOSPADM

## 2023-11-30 RX ORDER — FAMOTIDINE 10 MG/ML
20 INJECTION, SOLUTION INTRAVENOUS ONCE
Status: COMPLETED | OUTPATIENT
Start: 2023-11-30 | End: 2023-11-30

## 2023-11-30 RX ORDER — PROMETHAZINE HYDROCHLORIDE 25 MG/1
25 SUPPOSITORY RECTAL ONCE AS NEEDED
Status: DISCONTINUED | OUTPATIENT
Start: 2023-11-30 | End: 2023-11-30 | Stop reason: HOSPADM

## 2023-11-30 RX ORDER — ROCURONIUM BROMIDE 10 MG/ML
INJECTION, SOLUTION INTRAVENOUS AS NEEDED
Status: DISCONTINUED | OUTPATIENT
Start: 2023-11-30 | End: 2023-11-30 | Stop reason: SURG

## 2023-11-30 RX ORDER — MAGNESIUM SULFATE HEPTAHYDRATE 500 MG/ML
INJECTION, SOLUTION INTRAMUSCULAR; INTRAVENOUS AS NEEDED
Status: DISCONTINUED | OUTPATIENT
Start: 2023-11-30 | End: 2023-11-30 | Stop reason: SURG

## 2023-11-30 RX ORDER — PROMETHAZINE HYDROCHLORIDE 25 MG/1
25 TABLET ORAL ONCE AS NEEDED
Status: DISCONTINUED | OUTPATIENT
Start: 2023-11-30 | End: 2023-11-30 | Stop reason: HOSPADM

## 2023-11-30 RX ORDER — SODIUM CHLORIDE 0.9 % (FLUSH) 0.9 %
3-10 SYRINGE (ML) INJECTION AS NEEDED
Status: DISCONTINUED | OUTPATIENT
Start: 2023-11-30 | End: 2023-11-30 | Stop reason: HOSPADM

## 2023-11-30 RX ORDER — SODIUM CHLORIDE, SODIUM LACTATE, POTASSIUM CHLORIDE, CALCIUM CHLORIDE 600; 310; 30; 20 MG/100ML; MG/100ML; MG/100ML; MG/100ML
9 INJECTION, SOLUTION INTRAVENOUS CONTINUOUS
Status: DISCONTINUED | OUTPATIENT
Start: 2023-11-30 | End: 2023-11-30 | Stop reason: HOSPADM

## 2023-11-30 RX ORDER — DIPHENHYDRAMINE HYDROCHLORIDE 50 MG/ML
12.5 INJECTION INTRAMUSCULAR; INTRAVENOUS
Status: DISCONTINUED | OUTPATIENT
Start: 2023-11-30 | End: 2023-11-30 | Stop reason: HOSPADM

## 2023-11-30 RX ORDER — LABETALOL HYDROCHLORIDE 5 MG/ML
5 INJECTION, SOLUTION INTRAVENOUS
Status: DISCONTINUED | OUTPATIENT
Start: 2023-11-30 | End: 2023-11-30 | Stop reason: HOSPADM

## 2023-11-30 RX ORDER — HYDROMORPHONE HYDROCHLORIDE 1 MG/ML
0.5 INJECTION, SOLUTION INTRAMUSCULAR; INTRAVENOUS; SUBCUTANEOUS
Status: DISCONTINUED | OUTPATIENT
Start: 2023-11-30 | End: 2023-11-30 | Stop reason: HOSPADM

## 2023-11-30 RX ORDER — FENTANYL CITRATE 50 UG/ML
INJECTION, SOLUTION INTRAMUSCULAR; INTRAVENOUS AS NEEDED
Status: DISCONTINUED | OUTPATIENT
Start: 2023-11-30 | End: 2023-11-30 | Stop reason: SURG

## 2023-11-30 RX ORDER — DROPERIDOL 2.5 MG/ML
0.62 INJECTION, SOLUTION INTRAMUSCULAR; INTRAVENOUS
Status: DISCONTINUED | OUTPATIENT
Start: 2023-11-30 | End: 2023-11-30 | Stop reason: HOSPADM

## 2023-11-30 RX ORDER — LIDOCAINE HYDROCHLORIDE 20 MG/ML
INJECTION, SOLUTION INFILTRATION; PERINEURAL AS NEEDED
Status: DISCONTINUED | OUTPATIENT
Start: 2023-11-30 | End: 2023-11-30 | Stop reason: SURG

## 2023-11-30 RX ORDER — ONDANSETRON 2 MG/ML
4 INJECTION INTRAMUSCULAR; INTRAVENOUS ONCE AS NEEDED
Status: DISCONTINUED | OUTPATIENT
Start: 2023-11-30 | End: 2023-11-30 | Stop reason: HOSPADM

## 2023-11-30 RX ORDER — MIDAZOLAM HYDROCHLORIDE 1 MG/ML
0.5 INJECTION INTRAMUSCULAR; INTRAVENOUS
Status: COMPLETED | OUTPATIENT
Start: 2023-11-30 | End: 2023-11-30

## 2023-11-30 RX ORDER — FLUMAZENIL 0.1 MG/ML
0.2 INJECTION INTRAVENOUS AS NEEDED
Status: DISCONTINUED | OUTPATIENT
Start: 2023-11-30 | End: 2023-11-30 | Stop reason: HOSPADM

## 2023-11-30 RX ORDER — HYDROCODONE BITARTRATE AND ACETAMINOPHEN 5; 325 MG/1; MG/1
1 TABLET ORAL ONCE AS NEEDED
Status: DISCONTINUED | OUTPATIENT
Start: 2023-11-30 | End: 2023-11-30 | Stop reason: HOSPADM

## 2023-11-30 RX ORDER — ONDANSETRON 2 MG/ML
INJECTION INTRAMUSCULAR; INTRAVENOUS AS NEEDED
Status: DISCONTINUED | OUTPATIENT
Start: 2023-11-30 | End: 2023-11-30 | Stop reason: SURG

## 2023-11-30 RX ADMIN — LIDOCAINE HYDROCHLORIDE 100 MG: 20 INJECTION, SOLUTION INFILTRATION; PERINEURAL at 07:31

## 2023-11-30 RX ADMIN — FENTANYL CITRATE 25 MCG: 50 INJECTION, SOLUTION INTRAMUSCULAR; INTRAVENOUS at 08:49

## 2023-11-30 RX ADMIN — PROPOFOL 170 MG: 10 INJECTION, EMULSION INTRAVENOUS at 07:31

## 2023-11-30 RX ADMIN — GLYCOPYRROLATE 0.1 MCG: 0.2 INJECTION INTRAMUSCULAR; INTRAVENOUS at 07:48

## 2023-11-30 RX ADMIN — MIDAZOLAM 0.5 MG: 1 INJECTION INTRAMUSCULAR; INTRAVENOUS at 06:41

## 2023-11-30 RX ADMIN — SODIUM CHLORIDE, POTASSIUM CHLORIDE, SODIUM LACTATE AND CALCIUM CHLORIDE 9 ML/HR: 600; 310; 30; 20 INJECTION, SOLUTION INTRAVENOUS at 06:43

## 2023-11-30 RX ADMIN — FAMOTIDINE 20 MG: 10 INJECTION INTRAVENOUS at 06:41

## 2023-11-30 RX ADMIN — MAGNESIUM SULFATE HEPTAHYDRATE 1 G: 500 INJECTION, SOLUTION INTRAMUSCULAR; INTRAVENOUS at 08:09

## 2023-11-30 RX ADMIN — Medication 100 MCG: at 07:55

## 2023-11-30 RX ADMIN — ROCURONIUM BROMIDE 20 MG: 10 INJECTION, SOLUTION INTRAVENOUS at 08:38

## 2023-11-30 RX ADMIN — ROCURONIUM BROMIDE 20 MG: 10 INJECTION, SOLUTION INTRAVENOUS at 08:04

## 2023-11-30 RX ADMIN — ACETAMINOPHEN 500 MG: 500 TABLET ORAL at 06:41

## 2023-11-30 RX ADMIN — CEFAZOLIN SODIUM 2000 MG: 2 INJECTION, SOLUTION INTRAVENOUS at 07:19

## 2023-11-30 RX ADMIN — FENTANYL CITRATE 25 MCG: 50 INJECTION, SOLUTION INTRAMUSCULAR; INTRAVENOUS at 08:52

## 2023-11-30 RX ADMIN — ACETAMINOPHEN 500 MG: 500 TABLET ORAL at 06:55

## 2023-11-30 RX ADMIN — ROCURONIUM BROMIDE 50 MG: 10 INJECTION, SOLUTION INTRAVENOUS at 07:32

## 2023-11-30 RX ADMIN — LIDOCAINE HYDROCHLORIDE 100 MG: 20 INJECTION, SOLUTION INFILTRATION; PERINEURAL at 09:35

## 2023-11-30 RX ADMIN — SODIUM CHLORIDE, POTASSIUM CHLORIDE, SODIUM LACTATE AND CALCIUM CHLORIDE 1000 ML: 600; 310; 30; 20 INJECTION, SOLUTION INTRAVENOUS at 06:40

## 2023-11-30 RX ADMIN — ONDANSETRON 4 MG: 2 INJECTION INTRAMUSCULAR; INTRAVENOUS at 09:30

## 2023-11-30 RX ADMIN — ROCURONIUM BROMIDE 10 MG: 10 INJECTION, SOLUTION INTRAVENOUS at 08:48

## 2023-11-30 RX ADMIN — DEXAMETHASONE SODIUM PHOSPHATE 8 MG: 4 INJECTION, SOLUTION INTRAMUSCULAR; INTRAVENOUS at 07:39

## 2023-11-30 RX ADMIN — SUGAMMADEX 200 MG: 100 INJECTION, SOLUTION INTRAVENOUS at 09:37

## 2023-11-30 RX ADMIN — FENTANYL CITRATE 25 MCG: 50 INJECTION, SOLUTION INTRAMUSCULAR; INTRAVENOUS at 08:04

## 2023-11-30 RX ADMIN — FENTANYL CITRATE 25 MCG: 50 INJECTION, SOLUTION INTRAMUSCULAR; INTRAVENOUS at 07:47

## 2023-11-30 RX ADMIN — MIDAZOLAM 0.5 MG: 1 INJECTION INTRAMUSCULAR; INTRAVENOUS at 07:20

## 2023-11-30 RX ADMIN — OXYCODONE AND ACETAMINOPHEN 1 TABLET: 7.5; 325 TABLET ORAL at 10:07

## 2023-11-30 RX ADMIN — HYDROMORPHONE HYDROCHLORIDE 0.5 MG: 1 INJECTION, SOLUTION INTRAMUSCULAR; INTRAVENOUS; SUBCUTANEOUS at 09:55

## 2023-11-30 RX ADMIN — GLYCOPYRROLATE 0.1 MCG: 0.2 INJECTION INTRAMUSCULAR; INTRAVENOUS at 07:45

## 2023-11-30 NOTE — TELEPHONE ENCOUNTER
Pharmacy called to verify direction on the Celebrex. Is the patient supposed to take it 2 times a day for 3 days and then as needed? Or it the patient supposed to take it 2 times a day for 7 days? Please advise.

## 2023-11-30 NOTE — DISCHARGE INSTRUCTIONS
Dr. Sarah Haro  [address] [facility] [address]  [place] [id number] [phone]    Discharge Instructions for Hernia Surgery    Go home, rest and take it easy today; however, you should get up and move about several times today to reduce the risk of developing a clot in your legs.      You may experience some dizziness or memory loss from the anesthesia.  This may last for the next 24 hours.  Someone should plan on staying with you for the first 24 hours for your safety.    Do not make any important legal decisions or sign any legal papers for the next 24 hours.      Eat and drink lightly today.  Start off with liquids, jello, soup, crackers or other bland foods at first. You may advance your diet tomorrow as tolerated as long as you do not experience any nausea or vomiting.     If skin glue (Dermabond) was used, your incisions are protected and covered.  The invisible glue will dissolve on its own as your incision heals. If dressings were used, you may remove your outer dressings in 3 days.  The white tapes called steri-strips should stay in place.  They will fall off on their own in 1-2 weeks.  Do not worry if they come off sooner.      If dressings were used, you may notice some bleeding/drainage on your outer dressings. A little bloody drainage is normal. If the bleeding/drainage is such that the bandage cannot absorb it, remove the dressing, apply clean gauze and apply firm pressure for a full 15 minutes.  If the bleeding continues, please call me.    You may shower tomorrow allowing water to run over the incisions; however, do not scrub the incisions.  No tub baths until your incisions are completely healed.      No lifting > 10 lbs. until you are seen at your follow-up visit.         You have received a prescription for Celebrex, an NSAID similar to ibuprofen. Do not take this if you have a history of stomach ulcers. Do not take motrin, ibuprofen or aleve in addition to this.  Take it twice daily for 3  days, then as needed.  You may also take Tylenol (1000 mg every 6 hours) if you are not taking Norco/Percocet (each pill contains 325 mg of Tylenol).  Do not exceed 4000 mg of Tylenol in a 24hr period.    You have received a prescription for a narcotic pain medicine, as you will have some pain following surgery.   You will not be totally pain free, but your pain medicine should make the pain tolerable.  Please take your pain medicine as prescribed and always take your pills with food to prevent nausea. If you are having severe pain that cannot be controlled by the pain medicine, please contact me.      Narcotic pain medicine can cause constipation. Take an over the counter stool softener, like Miaralax or docusate to prevent constipation while taking narcotics.    You have also received a prescription for an anti-nausea medicine.  Please take this as prescribed for any nausea or vomiting.  Nausea could be a result of the anesthesia or a result of the narcotic pain medicine.  If you experience severe nausea and vomiting that cannot be controlled by the nausea medicine, please call me.      If you had a laparoscopic surgery, it is not unusual to experience pain/discomfort in your shoulders or under your ribs after surgery.  It is from the gas used during the laparoscopic procedure and usually lasts 1-3 days.  The prescription pain medicine is used to treat the surgical pain and does not typically alleviate this “gassy” pain.     No driving for 24 hours and for as long as you are taking your prescription pain medicine.    You will need to call the office at [phone] to schedule a follow-up appointment in 10-14 days.     Remember to contact me for any of the following:    Fever > 101 degrees  Severe pain that cannot be controlled by taking your pain pills  Severe nausea or vomiting that cannot be controlled by taking your nausea pills  Significant bleeding of your incisions  Drainage that has a bad smell or is  yellow or green in appearance  Any other questions or concerns      Additional Instruction for Inguinal Hernia Patients Only    If you did not urinate at the hospital after your surgery or if you feel the need to urinate and cannot, this will necessitate a return to the Emergency Room for placement of a urinary catheter.  You should also notify me as well.  As a rule, you should be able to empty your bladder within 4-6 hours after discharge from the hospital.      You may notice some scrotal bruising and/or swelling. A scrotal support or briefs as well as ice packs may be used to alleviate discomfort.

## 2023-11-30 NOTE — ANESTHESIA POSTPROCEDURE EVALUATION
Patient: Luca Carolina Jr.    Procedure Summary       Date: 11/30/23 Room / Location: [facility] OR [address] [place] [address] MAIN OR    Anesthesia Start: 0725 Anesthesia Stop: 0957    Procedure: Robotic assisted laparoscopic bilateral inguinal hernia repair (Bilateral: Abdomen) Diagnosis:       Left inguinal hernia      (Left inguinal hernia [K40.90])    Surgeons: Sarah Haro MD Provider: Dany Lainez MD    Anesthesia Type: general ASA Status: 3            Anesthesia Type: general    Vitals  Vitals Value Taken Time   /89 11/30/23 1015   Temp 36.7 °C (98 °F) 11/30/23 0955   Pulse 68 11/30/23 1028   Resp 16 11/30/23 1015   SpO2 95 % 11/30/23 1028   Vitals shown include unfiled device data.        Post Anesthesia Care and Evaluation    Patient location during evaluation: PACU  Patient participation: complete - patient participated  Level of consciousness: awake and alert  Pain management: adequate    Airway patency: patent  Anesthetic complications: No anesthetic complications  PONV Status: controlled  Cardiovascular status: acceptable and hemodynamically stable  Respiratory status: acceptable  Hydration status: acceptable    Comments: /89   Pulse 70   Temp 36.7 °C (98 °F) (Oral)   Resp 16   SpO2 98%

## 2023-11-30 NOTE — ANESTHESIA PREPROCEDURE EVALUATION
Anesthesia Evaluation     Patient summary reviewed and Nursing notes reviewed   no history of anesthetic complications:                Airway   Mallampati: II  TM distance: >3 FB  Neck ROM: full  Large neck circumference  Dental - normal exam     Pulmonary    (+) ,sleep apnea on CPAP  Cardiovascular     ECG reviewed    (+) hyperlipidemia      Neuro/Psych  (+) psychiatric history ADD  GI/Hepatic/Renal/Endo    (+) GERD, renal disease- stones, diabetes mellitus (preDM) type 2    Musculoskeletal     Abdominal    Substance History      OB/GYN          Other        ROS/Med Hx Other: Left inguinal hernia without obstruction or incarceration                    Anesthesia Plan    ASA 3     general     (I have reviewed the patient's history with the patient and the chart, including all pertinent laboratory results and imaging. I have explained the risks of anesthesia including but not limited to dental damage, corneal abrasion, nerve injury, MI, stroke, and death. Questions asked and answered. Anesthetic plan discussed with patient and team as indicated. Patient expressed understanding of the above.  )  intravenous induction     Anesthetic plan, risks, benefits, and alternatives have been provided, discussed and informed consent has been obtained with: patient.        CODE STATUS:

## 2023-11-30 NOTE — OP NOTE
OPERATIVE REPORT     Luca Carolina Jr.  1955    Procedure Date: 11/30/23    Pre-op Diagnosis:  Left inguinal hernia [K40.90]    Post-op Diagnosis:  Bilateral inguinal hernias    Procedure:   DaVinci assisted Laparoscopic Bilateral transabdominal preperitoneal herniorrhaphy with mesh  Primary umbilical hernia repair    Surgeon: Sarah Haro MD    Assistant: Timo Mcrae CSA was responsible for performing the following activities: Retraction, Suction, Irrigation, Suturing, Closing, Held/Positioned Camera, and exchange of robotic instruments  and their skilled assistance was necessary for the success of this case.      Anesthesia: General    Mesh Used: 3D Extra Large 12 by 17 cm medium weight polypropylene mesh.      Complications: None    Estimated Blood Loss: minimal    Indications for Operation: Luca Carolina Jr. is a 68 y.o. year old male with symptomatic left inguinal hernia. The risks and benefits of open, conservative, laparoscopic and robotic management were discussed at length and in detail with the patient including bleeding, infection, mesh infection, chronic pain, hernia recurrence.  He has chosen to undergo robotic assisted laparoscopic left inguinal hernia repair with mesh, possible bilateral if a hernia is found intraoperatively. Informed consent was obtained.      OPERATION:  The patient was brought to the operating room in stable condition.   Preoperative antibiotics were given and sequential compression devices were applied.  At this time, the patient was laid supine on the operating room table.  General anesthesia was induced by the Anesthesia service without difficulty.  A vieira catheter was placed in the standard fashion. The patient's abdomen was prepped and draped in the usual sterile fashion.      Half percent Marcaine with epinephrine was injected into the subcutaneous tissues.  An incision was made superior to the umbilicus. There was a 1 cm umbilical fascial defect,  therefore I elected to place my port through this and repair it primarily.  The hernia sac was  from the posterior aspect of the umbilical skin. The fascia was cleared circumferentially. An 0 vicryl suture was placed in a figure of eight fashion and tagged.  A 5 mm 0 degree scope was inserted using the robotic Optiview trocar and pneumoperitoneum was achieved.  Brief survey of the abdominal cavity revealed no intra-abdominal injury, indirect defect on the left,  and an indirect hernia on the right. The operation was continued by insertion of 2 additional 8 mm trocars bilaterally at the level of the umbilicus.  These were inserted under direct visualization.  Robotic arms were brought into the operative field and connected to the trochars.  The camera was then introduced into the patient abdomen through the supraumbilical trocar and targeting of anatomy was performed.  Robotic scissors and force bipolar forceps were introduced under direct camera vision.    On both sides the same dissection was carried out. A large peritoneal flap was raised from the level of the anterior superior iliac spine to the umbilical ligament at a level approximately 3 cm above the indirect inguinal space.  The medial space was developed first, followed by the lateral, using blunt dissection with the aid of monopolar cautery.  The medial dissection exposed the midline space of Retzius as well as Constantine ligament.  The indirect hernia sac was then gently dissected from the underlying cord structures with care to preserve them.  The cord structures were peritonealized back to the level of the genu of the vas deferens.      Next, two 3D max XL medium weight polypropylene mesh were inserted through one of the robotic trocars, along with a 2-0 Vicryl and a 2-0 PDS strata fix suture. The mesh was positioned to cover the direct and indirect space with wide overlap and midline overlap of the two mesh.  Each mesh was sutured with 2-0 Vicryl  to Constantine ligament as well as lateral and medial to the epigastric vessels.  The peritoneal flap was closed with running 2-0 PDS strata fix suture. The right peritoneal flap had a few small holes, which were closed with the vicryl suture.    Examination of the abdominal cavity showed no evidence of injury from the procedure.  The needles were removed from the patient abdomen and the count was correct.  The robotic arms were then removed under direct camera vision.  Pneumoperitoneum was desufflated and the trocars were removed.  The umbilical fascial suture was tied. The umbilical skin was tacked to the fascia with a 3-0 vicryl. Incisions were closed with running 4-0 Monocryl and surgical glue.  The patient tolerated procedure well was sent to recovery area in stable condition.  Instrument, sponge and needle counts were correct at the end of the procedure.      Sarah Haro MD  General, Robotic and Endoscopic Surgery  [facility]     [address] [place] [address], [address]  [place], [place], [address]  P: [phone]  F: [phone]

## 2023-11-30 NOTE — ANESTHESIA PROCEDURE NOTES
Airway  Urgency: elective    Date/Time: 11/30/2023 7:35 AM  Airway not difficult    General Information and Staff    Patient location during procedure: OR  Anesthesiologist: Dany Lainez MD  CRNA/CAA: Hossein Beaver CRNA    Indications and Patient Condition  Indications for airway management: airway protection    Preoxygenated: yes  MILS not maintained throughout  Mask difficulty assessment: 1 - vent by mask    Final Airway Details  Final airway type: endotracheal airway      Successful airway: ETT  Cuffed: yes   Successful intubation technique: direct laryngoscopy  Facilitating devices/methods: anterior pressure/BURP  Endotracheal tube insertion site: oral  Blade: Elif  Blade size: 4  ETT size (mm): 7.0  Cormack-Lehane Classification: grade IIa - partial view of glottis  Placement verified by: chest auscultation and capnometry   Cuff volume (mL): 9  Measured from: lips  ETT/EBT  to lips (cm): 22  Number of attempts at approach: 1  Assessment: lips, teeth, and gum same as pre-op and atraumatic intubation

## 2023-12-04 RX ORDER — CELECOXIB 200 MG/1
200 CAPSULE ORAL DAILY PRN
Qty: 30 CAPSULE | Refills: 0 | Status: SHIPPED | OUTPATIENT
Start: 2023-12-04

## 2023-12-12 ENCOUNTER — OFFICE VISIT (OUTPATIENT)
Dept: SURGERY | Facility: CLINIC | Age: 68
End: 2023-12-12
Payer: MEDICARE

## 2023-12-12 VITALS
HEIGHT: 72 IN | SYSTOLIC BLOOD PRESSURE: 110 MMHG | WEIGHT: 203 LBS | BODY MASS INDEX: 27.5 KG/M2 | DIASTOLIC BLOOD PRESSURE: 70 MMHG

## 2023-12-12 DIAGNOSIS — K40.20 NON-RECURRENT BILATERAL INGUINAL HERNIA WITHOUT OBSTRUCTION OR GANGRENE: Primary | ICD-10-CM

## 2023-12-13 NOTE — PROGRESS NOTES
General Surgery Post-Operative Follow Up Note     History of Present Illness:    Luca Carolina Jr. is a 68 y.o. year old male who presents for post-operative follow up from da Grant assisted laparoscopic bilateral inguinal hernia repair with mesh.  He has been doing pretty well overall.  He reports some remaining scrotal pain, which is worse when sitting.    Procedure:    Da Grant assisted laparoscopic bilateral inguinal hernia repair with mesh    Pathology:    None    Physical Exam:   Constitutional: Well-developed well-nourished, no acute distress  Eyes: Conjunctiva normal, sclera nonicteric  ENMT: Hearing grossly normal, oral mucosa moist  Respiratory: No increased work of breathing, normal inspiratory effort  Cardiovascular: Regular rate, no peripheral edema, no jugular venous distention  Gastrointestinal: Soft, nontender, incisions clean, dry and intact  Skin:  Warm, dry, no rash on visualized skin surfaces  Musculoskeletal: Symmetric strength, normal gait  Psychiatric: Alert and oriented ×3, normal affect       Assessment/Plan:  Status post bilateral inguinal hernia repair with mesh  -Okay to resume activities as tolerated.  May increase lifting to 20 to 25 pounds for the next 2 weeks, then as tolerated.   -Follow-up with me as needed.      Sarah Haro M.D.  General, Robotic and Endoscopic Surgery  [facility]    [address], [address]  [place], [place], [address]  P: [phone]  F: [phone]

## 2024-01-10 DIAGNOSIS — R45.4 IRRITABILITY: ICD-10-CM

## 2024-01-21 DIAGNOSIS — I25.10 CORONARY ARTERY CALCIFICATION SEEN ON CT SCAN: ICD-10-CM

## 2024-01-21 DIAGNOSIS — E78.00 PURE HYPERCHOLESTEROLEMIA: Chronic | ICD-10-CM

## 2024-01-22 RX ORDER — ATORVASTATIN CALCIUM 40 MG/1
40 TABLET, FILM COATED ORAL DAILY
Qty: 90 TABLET | Refills: 1 | Status: SHIPPED | OUTPATIENT
Start: 2024-01-22

## 2024-01-31 DIAGNOSIS — R73.03 PREDIABETES: ICD-10-CM

## 2024-01-31 RX ORDER — METFORMIN HYDROCHLORIDE 500 MG/1
500 TABLET, EXTENDED RELEASE ORAL
Qty: 90 TABLET | Refills: 1 | Status: SHIPPED | OUTPATIENT
Start: 2024-01-31

## 2024-02-13 ENCOUNTER — PREP FOR SURGERY (OUTPATIENT)
Dept: OTHER | Facility: HOSPITAL | Age: 69
End: 2024-02-13
Payer: MEDICARE

## 2024-02-13 DIAGNOSIS — Z86.010 HISTORY OF COLON POLYPS: Primary | ICD-10-CM

## 2024-02-29 NOTE — TELEPHONE ENCOUNTER
Patient called to inquire the status of the [employer] medical form he dropped off for Dr. Dewey to fill out on 07/13/23.   occasionally words are mis-transcribed.)    DO Destiny Mir Seth, DO  03/01/24 [id number]

## 2024-03-05 PROBLEM — Z86.010 HISTORY OF COLON POLYPS: Status: ACTIVE | Noted: 2024-02-13

## 2024-04-17 ENCOUNTER — ANESTHESIA EVENT (OUTPATIENT)
Dept: GASTROENTEROLOGY | Facility: HOSPITAL | Age: 69
End: 2024-04-17
Payer: MEDICARE

## 2024-04-17 ENCOUNTER — HOSPITAL ENCOUNTER (OUTPATIENT)
Facility: HOSPITAL | Age: 69
Setting detail: HOSPITAL OUTPATIENT SURGERY
Discharge: HOME | End: 2024-04-17
Attending: SURGERY | Admitting: SURGERY
Payer: MEDICARE

## 2024-04-17 ENCOUNTER — ANESTHESIA (OUTPATIENT)
Dept: GASTROENTEROLOGY | Facility: HOSPITAL | Age: 69
End: 2024-04-17
Payer: MEDICARE

## 2024-04-17 VITALS
SYSTOLIC BLOOD PRESSURE: 118 MMHG | HEIGHT: 72 IN | OXYGEN SATURATION: 98 % | WEIGHT: 200.7 LBS | HEART RATE: 54 BPM | RESPIRATION RATE: 16 BRPM | BODY MASS INDEX: 27.19 KG/M2 | DIASTOLIC BLOOD PRESSURE: 75 MMHG

## 2024-04-17 PROCEDURE — S0260 H&P FOR SURGERY: HCPCS | Performed by: SURGERY

## 2024-04-17 PROCEDURE — 25010000002 PROPOFOL 1000 MG/100ML EMULSION: Performed by: NURSE ANESTHETIST, CERTIFIED REGISTERED

## 2024-04-17 PROCEDURE — 25810000003 LACTATED RINGERS PER 1000 ML: Performed by: SURGERY

## 2024-04-17 PROCEDURE — G0105 COLORECTAL SCRN; HI RISK IND: HCPCS | Performed by: SURGERY

## 2024-04-17 RX ORDER — SODIUM CHLORIDE, SODIUM LACTATE, POTASSIUM CHLORIDE, CALCIUM CHLORIDE 600; 310; 30; 20 MG/100ML; MG/100ML; MG/100ML; MG/100ML
30 INJECTION, SOLUTION INTRAVENOUS CONTINUOUS PRN
Status: DISCONTINUED | OUTPATIENT
Start: 2024-04-17 | End: 2024-04-17 | Stop reason: HOSPADM

## 2024-04-17 RX ORDER — LIDOCAINE HYDROCHLORIDE 20 MG/ML
INJECTION, SOLUTION INFILTRATION; PERINEURAL AS NEEDED
Status: DISCONTINUED | OUTPATIENT
Start: 2024-04-17 | End: 2024-04-17 | Stop reason: SURG

## 2024-04-17 RX ORDER — PROPOFOL 10 MG/ML
INJECTION, EMULSION INTRAVENOUS AS NEEDED
Status: DISCONTINUED | OUTPATIENT
Start: 2024-04-17 | End: 2024-04-17 | Stop reason: SURG

## 2024-04-17 RX ADMIN — PROPOFOL INJECTABLE EMULSION 100 MG: 10 INJECTION, EMULSION INTRAVENOUS at 13:11

## 2024-04-17 RX ADMIN — PROPOFOL INJECTABLE EMULSION 160 MCG/KG/MIN: 10 INJECTION, EMULSION INTRAVENOUS at 13:12

## 2024-04-17 RX ADMIN — LIDOCAINE HYDROCHLORIDE 60 MG: 20 INJECTION, SOLUTION INFILTRATION; PERINEURAL at 13:11

## 2024-04-17 RX ADMIN — SODIUM CHLORIDE, POTASSIUM CHLORIDE, SODIUM LACTATE AND CALCIUM CHLORIDE 30 ML/HR: 600; 310; 30; 20 INJECTION, SOLUTION INTRAVENOUS at 12:53

## 2024-04-17 NOTE — H&P
GENERAL SURGERY HISTORY AND PHYSICAL     CHIEF COMPLAINT:    High risk screening colonoscopy     HPI:    Luca Carolina Jr. is a 68 y.o. male here for screening colonoscopy who has a personal history of colon polyps.    ALLERGIES:   No Known Allergies    MEDICATIONS:    Reviewed in Epic       PHYSICAL EXAM:   Constitutional: Well-developed well-nourished, no acute distress  Eyes: Conjunctiva normal, sclera nonicteric  ENMT: Hearing grossly normal, oral mucosa moist  Neck: Supple, trachea midline  Respiratory: No increased work of breathing, normal inspiratory effort  Cardiovascular: Regular rate, no peripheral edema, no jugular venous distention  Gastrointestinal: Soft, nontender  Skin:  Warm, dry, no rash on visualized skin surfaces  Musculoskeletal: Symmetric strength, normal gait  Psychiatric: Alert and oriented ×3, normal affect       ASSESSMENT/PLAN:  Luca Carolina Jr. is a 68 y.o. male here for screening colonoscopy.     Sarah Haro MD  General, Robotic and Endoscopic Surgery  [facility]    [address], [address]  [place], [place], [address]  P: [phone]  F: [phone]

## 2024-04-17 NOTE — ANESTHESIA POSTPROCEDURE EVALUATION
Patient: Luca Carolina Jr.    Procedure Summary       Date: 04/17/24 Room / Location: [facility] ENDOSCOPY 1 / [facility] ENDOSCOPY    Anesthesia Start: 1309 Anesthesia Stop: 1334    Procedure: COLONOSCOPY TO CECUM Diagnosis:       History of colon polyps      (History of colon polyps [Z86.010])    Surgeons: Sarah Haro MD Provider: Dany Diaz MD    Anesthesia Type: MAC ASA Status: 3            Anesthesia Type: MAC    Vitals  Vitals Value Taken Time   /69 04/17/24 1352   Temp     Pulse 56 04/17/24 1354   Resp 16 04/17/24 1341   SpO2 95 % 04/17/24 1354   Vitals shown include unfiled device data.        Post Anesthesia Care and Evaluation    Patient location during evaluation: PACU  Patient participation: complete - patient participated  Level of consciousness: awake and alert  Pain management: adequate    Airway patency: patent  Anesthetic complications: No anesthetic complications    Cardiovascular status: acceptable  Respiratory status: acceptable  Hydration status: acceptable    Comments: --------------------            04/17/24               1341     --------------------   BP:       108/71     Pulse:      52       Resp:       16       SpO2:      97%      --------------------

## 2024-04-17 NOTE — OP NOTE
PREOPERATIVE DIAGNOSIS:  Screening colonoscopy    POSTOPERATIVE DIAGNOSIS AND FINDINGS:  Normal mucosa  Diverticulosis    PROCEDURE:  Colonoscopy to cecum     SURGEON:  Kamaljit Mederos MD    ANESTHESIA:  MAC    SPECIMEN(S):  none    DESCRIPTION:  In the decubitus position, a digital rectal exam was performed and was normal. The colonoscope was inserted under direct visualization of the lumen to the cecum, which was confirmed by visualization of the ileocecal valve and appendiceal orifice. The scope was then slowly withdrawn circumferentially examining all mucosal surfaces. There were no mucosal abnormalities. Scattered diverticulosis seen. The quality of the bowel preparation was good. The patient tolerated the procedure well.    RECOMMENDATION FOR FUTURE SURVEILLANCE:  10 years    KAMALJIT MEDEROS MD  General, Robotic and Endoscopic Surgery  [facility]    [address], [address]  [place], [place], [address]  P: [phone]  F: [phone]

## 2024-04-17 NOTE — ANESTHESIA PREPROCEDURE EVALUATION
Anesthesia Evaluation     Patient summary reviewed and Nursing notes reviewed                Airway   Mallampati: III  TM distance: >3 FB  Neck ROM: full  Dental      Pulmonary    (+) ,sleep apnea on CPAP  Cardiovascular     ECG reviewed  Rhythm: regular  Rate: normal    (+) CAD, hyperlipidemia      Neuro/Psych  (+) psychiatric history ADD  GI/Hepatic/Renal/Endo    (+) GERD, renal disease- stones, diabetes mellitus type 2    Musculoskeletal (-) negative ROS    Abdominal    Substance History - negative use     OB/GYN negative ob/gyn ROS         Other                      Anesthesia Plan    ASA 3     MAC     (Grade 2a airway by history)  intravenous induction     Anesthetic plan, risks, benefits, and alternatives have been provided, discussed and informed consent has been obtained with: patient.    CODE STATUS:

## 2024-04-17 NOTE — DISCHARGE INSTRUCTIONS
For the next 24 hours patient needs to be with a responsible adult.    For 24 hours DO NOT drive, operate machinery, appliances, drink alcohol, make important decisions or sign legal documents.    Start with a light or bland diet if you are feeling sick to your stomach otherwise advance to regular diet as tolerated.    Follow recommendations on procedure report if provided by your doctor.    Call Dr. Haro for problems [phone]    Problems may include but not limited to: large amounts of bleeding, trouble breathing, repeated vomiting, severe unrelieved pain, fever or chills.

## 2024-04-18 ENCOUNTER — OFFICE VISIT (OUTPATIENT)
Dept: INTERNAL MEDICINE | Age: 69
End: 2024-04-18
Payer: MEDICARE

## 2024-04-18 VITALS
HEART RATE: 63 BPM | DIASTOLIC BLOOD PRESSURE: 76 MMHG | BODY MASS INDEX: 27.5 KG/M2 | TEMPERATURE: 97.1 F | WEIGHT: 203 LBS | HEIGHT: 72 IN | OXYGEN SATURATION: 98 % | SYSTOLIC BLOOD PRESSURE: 110 MMHG

## 2024-04-18 DIAGNOSIS — F39 MOOD DISORDER: Chronic | ICD-10-CM

## 2024-04-18 DIAGNOSIS — E78.00 PURE HYPERCHOLESTEROLEMIA: Primary | Chronic | ICD-10-CM

## 2024-04-18 DIAGNOSIS — I25.10 CORONARY ARTERY CALCIFICATION SEEN ON CT SCAN: Chronic | ICD-10-CM

## 2024-04-18 DIAGNOSIS — R73.03 PREDIABETES: Chronic | ICD-10-CM

## 2024-04-18 LAB
CHOLEST SERPL-MCNC: 139 MG/DL (ref 0–200)
CHOLEST/HDLC SERPL: 2.9 {RATIO}
HBA1C MFR BLD: 5.6 % (ref 4.8–5.6)
HDLC SERPL-MCNC: 48 MG/DL (ref 40–60)
LDLC SERPL CALC-MCNC: 76 MG/DL (ref 0–100)
TRIGL SERPL-MCNC: 78 MG/DL (ref 0–150)
VLDLC SERPL CALC-MCNC: 15 MG/DL (ref 5–40)

## 2024-04-18 NOTE — PROGRESS NOTES
"    I N T E R N A L  M E D I C I N E    J U N O H  K I M,  M D      ENCOUNTER DATE:  04/18/2024    Luca Carolina Jr. / 68 y.o. / male    CHIEF COMPLAINT / REASON FOR OFFICE VISIT     Pure hypercholesterolemia, Increased prostate specific antigen (PSA) velocity, Irritability, and Prediabetes      ASSESSMENT & PLAN     Problem List Items Addressed This Visit          High    Pure hypercholesterolemia - Primary (Chronic)    Overview     Continue atorvastatin 40 mg.          Relevant Medications    atorvastatin (LIPITOR) 40 MG tablet    Other Relevant Orders    Lipid Panel With / Chol / HDL Ratio       Medium    Mood disorder (Chronic)    Overview     Continue sertraline 50 mg         Relevant Medications    sertraline (ZOLOFT) 50 MG tablet    Prediabetes (Chronic)    Overview     Continue metformin  mg daily         Relevant Medications    metFORMIN ER (GLUCOPHAGE-XR) 500 MG 24 hr tablet    Other Relevant Orders    Hemoglobin A1c    Coronary artery calcification seen on CT scan (Chronic)    Overview     4/2021 : noted CT coronary calcium score (extensive atherosclerotic burden of RCA and LAD)    Continue ASA 81 mg qd and atorvastatin 40 mg qd.          Relevant Medications    aspirin 81 MG EC tablet    atorvastatin (LIPITOR) 40 MG tablet     Orders Placed This Encounter   Procedures    Lipid Panel With / Chol / HDL Ratio    Hemoglobin A1c     No orders of the defined types were placed in this encounter.      SUMMARY/DISCUSSION      Next Appointment with me: Visit date not found    Return in about 6 months (around 10/18/2024) for **SCHEDULE COMBINED AWV AND MEDICAL F/U**.      VITAL SIGNS     Vitals:    04/18/24 0739   BP: 110/76   Pulse: 63   Temp: 97.1 °F (36.2 °C)   SpO2: 98%   Weight: 92.1 kg (203 lb)   Height: 182.9 cm (72\")       BP Readings from Last 3 Encounters:   04/18/24 110/76   04/17/24 118/75   12/12/23 110/70     Wt Readings from Last 3 Encounters:   04/18/24 92.1 kg (203 lb)   04/17/24 91 kg (200 " lb 11.2 oz)   12/12/23 92.1 kg (203 lb)     Body mass index is 27.53 kg/m².    Blood pressure readings recorded on patient flowsheet:       No data to display                  MEDICATIONS AT THE TIME OF OFFICE VISIT     Current Outpatient Medications on File Prior to Visit   Medication Sig    aspirin 81 MG EC tablet Take 1 tablet by mouth Daily.    atorvastatin (LIPITOR) 40 MG tablet TAKE 1 TABLET BY MOUTH DAILY    fexofenadine (ALLEGRA) 180 MG tablet Take 1 tablet by mouth Daily.    metFORMIN ER (GLUCOPHAGE-XR) 500 MG 24 hr tablet TAKE 1 TABLET BY MOUTH DAILY WITH DINNER.    pantoprazole (PROTONIX) 40 MG EC tablet TAKE ONE TABLET BY MOUTH DAILY (Patient taking differently: 1 tablet Daily.)    sertraline (ZOLOFT) 50 MG tablet Take 1 tablet by mouth Daily.    Triamcinolone Acetonide (NASACORT) 55 MCG/ACT nasal inhaler 2 sprays into the nostril(s) as directed by provider Daily.     Current Facility-Administered Medications on File Prior to Visit   Medication    [DISCONTINUED] lactated ringers infusion    [DISCONTINUED] lidocaine (XYLOCAINE) 2% injection    [DISCONTINUED] Propofol (DIPRIVAN) injection          HISTORY OF PRESENT ILLNESS     Patient denies any acute problems or changes.  He reports compliance with all his medications.  No chest pain or dyspnea on exertion.  He remains on aspirin 81 mg for coronary artery disease.  He is on atorvastatin 40 mg for hyperlipidemia.  He remains on sertraline 50 mg for mood irritability and remains stable clinically.  He takes metformin  mg once daily for prediabetes.  Weight overall remains unchanged.    Patient Care Team:  Christiano Dewey MD as PCP - General (Internal Medicine)    REVIEW OF SYSTEMS     Review of Systems       PHYSICAL EXAMINATION     Physical Exam  General: No acute distress  Psych: Normal thought and judgment   Normal affect and mood.   Cardiovascular Rate: normal. Rhythm: regular. Heart sounds: normal   Pulm/Chest: Effort normal, breath sounds normal.  "      REVIEWED DATA     Labs:     Lab Results   Component Value Date     11/20/2023    K 4.5 11/20/2023    CALCIUM 9.6 11/20/2023    AST 25 07/13/2023    ALT 26 07/13/2023    BUN 17 11/20/2023    CREATININE 0.81 11/20/2023    CREATININE 0.91 04/11/2023    CREATININE 0.87 10/10/2022    EGFRRESULT 92.4 04/11/2023       Lab Results   Component Value Date    HGBA1C 5.80 (H) 04/11/2023    HGBA1C 5.70 (H) 10/10/2022    HGBA1C 6.1 (H) 04/08/2022       Lab Results   Component Value Date    LDL 99 07/13/2023    LDL 86 04/11/2023    LDL 88 10/10/2022    HDL 57 07/13/2023    HDL 55 04/11/2023    TRIG 79 07/13/2023    TRIG 130 04/11/2023       Lab Results   Component Value Date    TSH 2.040 03/23/2020    TSH 3.200 11/08/2019    FREET4 1.09 03/23/2020    FREET4 1.04 11/08/2019       Lab Results   Component Value Date    WBC 4.51 11/20/2023    HGB 13.5 11/20/2023     11/20/2023     Lab Results   Component Value Date    PSA 3.5 10/10/2023    PSA 2.980 04/11/2023    PSA 2.2 04/08/2022      No results found for: \"MALBCRERATIO\"        Imaging:           Medical Tests:           Summary of old records / correspondence / consultant report:           Request outside records:        "

## 2024-05-21 NOTE — PROGRESS NOTES
Subjective   Luca Carolina Jr. is a 62 y.o. male who presents today for:    ADD (CSE)    History of Present Illness     ADD follow-up: He was diagnosed after undergoing neuropsychiatric testing in 2007. After being tried on other stimulants, Adderall seemed to work well with no side effects. He felt that the medication was making him more wired than he would like. We changed to the 5 mg dose with good benefit, but he needed a second dose to get through afternoon and evening meetings (prn).  He denies insomnia.      We added sertraline in September, 2010, for mood lability and irritability. He noticed a benefit in particular when he would run out of the medicine, with a recurrence of symptoms off the sertraline.     Mr. Carolina  reports that he has never smoked. He has never used smokeless tobacco. He reports that he drinks about 3.0 oz of alcohol per week  He reports that he does not use illicit drugs.     Allergies   Allergen Reactions   • Penicillins Nausea Only     QUESTIONABLE REACTION         Current Outpatient Prescriptions:   •  amphetamine-dextroamphetamine XR (ADDERALL XR) 5 MG 24 hr capsule, Take 1 capsule by mouth 2 (Two) Times a Day., Disp: 60 capsule, Rfl: 0  •  atorvastatin (LIPITOR) 20 MG tablet, TAKE 1 TABLET BY MOUTH DAILY, Disp: 90 tablet, Rfl: 0  •  ibuprofen (ADVIL,MOTRIN) 200 MG tablet, Take 200 mg by mouth every 6 (six) hours as needed for mild pain (1-3)., Disp: , Rfl:   •  Loratadine 10 MG capsule, Take  by mouth., Disp: , Rfl:   •  sertraline (ZOLOFT) 25 MG tablet, TAKE 1 TABLET BY MOUTH DAILY, Disp: 30 tablet, Rfl: 12  •  Triamcinolone Acetonide (NASACORT) 55 MCG/ACT nasal inhaler, 2 sprays into each nostril daily., Disp: , Rfl:       Review of Systems   Constitutional: Negative for unexpected weight change.   Cardiovascular: Negative for chest pain and palpitations.   Psychiatric/Behavioral: Positive for decreased concentration. The patient is not nervous/anxious.          Objective  "  Vitals:    12/01/17 1424   BP: 116/82   BP Location: Left arm   Patient Position: Sitting   Cuff Size: Large Adult   Pulse: 68   SpO2: 98%   Weight: 191 lb 6.4 oz (86.8 kg)   Height: 72\" (182.9 cm)     Physical Exam    Well-developed, well-nourished, in no acute distress.  Regular rate and rhythm.  Mood is upbeat and affect is appropriate.    Assessment/Plan   Luca was seen today for add.    Diagnoses and all orders for this visit:    Flu vaccine need  -     Flu Vaccine Quad PF 3YR+    Attention deficit disorder (ADD) without hyperactivity    -     amphetamine-dextroamphetamine XR (ADDERALL XR) 5 MG 24 hr capsule; Take 1 capsule by mouth 2 (Two) Times a Day.    MALLORIE report was obtained and reviewed.  2 prescriptions were given to the patient today.  The first, for 30 days, we'll allow the pharmacy to obtain a supply sufficient to last the patient 2 months thereafter.  We will try to prescribe the medication on a 3 month basis going forward.  This dose is served him well.  " none of the above

## 2024-07-03 ENCOUNTER — PATIENT ROUNDING (BHMG ONLY) (OUTPATIENT)
Dept: URGENT CARE | Facility: CLINIC | Age: 69
End: 2024-07-03
Payer: MEDICARE

## 2024-07-04 NOTE — ED NOTES
Thank you for letting us care for you in your recent visit to our urgent care center. We would love to hear about your experience with us. Was this the first time you have visited our location?    We’re always looking for ways to make our patients’ experiences even better. Do you have any recommendations on ways we may improve?     I appreciate you taking the time to respond. Please be on the lookout for a survey about your recent visit from [employer] via text or email. We would greatly appreciate if you could fill that out and turn it back in. We want your voice to be heard and we value your feedback.   Thank you for choosing [facility] for your healthcare needs.

## 2024-07-06 DIAGNOSIS — R45.4 IRRITABILITY: ICD-10-CM

## 2024-07-16 DIAGNOSIS — E78.00 PURE HYPERCHOLESTEROLEMIA: Chronic | ICD-10-CM

## 2024-07-16 DIAGNOSIS — I25.10 CORONARY ARTERY CALCIFICATION SEEN ON CT SCAN: ICD-10-CM

## 2024-07-16 NOTE — PROGRESS NOTES
"  [facility]  [address]  [address]  [place] [address]  Phone   Fax         SLEEP CLINIC FOLLOW-UP PROGRESS NOTE    Luca Carolina Jr.  [id number]   1955  68 y.o.  male      PCP: Christiano Dewey MD    DATE OF VISIT: 7/17/2024          CHIEF COMPLAINT: Obstructive sleep apnea on CPAP, DOT license guerrier    HPI:  This is a 68 y.o. year old patient who presents to the clinic today for the management of obstructive sleep apnea.   Previously followed by Dr. Hanley.  Patient had sleep study 7/2022 showing severe obstructive sleep apnea with AHI of 44.8/hr at that time.  He has been on auto CPAP with a ResMed air sense 11 at 8 to 16 cm H2O.   He established with our office 7/2023 at which point device download showed good usage of device with good control of sleep apnea with AHI of 1.2/h.  He presents today for 1 year follow-up.  Patient's sleep apnea has improved with this therapy with residual AHI 1.5/hr.   Average usage is 7 hours 25 minutes per night on nights used.   Patient tolerating device well and improved with treatment.      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  Patient has good usage of CPAP with good control of sleep apnea.  Patient denies any issues whatsoever staying awake at work or with driving.  Adequate amount of nighttime sleep.        MEDICATIONS: reviewed     ALLERGIES:  Patient has no known allergies.    SOCIAL HISTORY (habits pertaining to sleep medicine):  Tobacco use: No   Alcohol use: 4-5 per week  Caffeine use: 1     REVIEW OF SYSTEMS:   Pertinent positive symptoms are:  Pollard Sleepiness Scale :Total score: 4   Sore throat: Has upcoming appointment with ENT to address.        PHYSICAL EXAMINATION:  CONSTITUTIONAL:  Vitals:    07/17/24 0903   Pulse: 64   SpO2: 98%   Weight: 91.5 kg (201 lb 12.8 oz)   Height: 182.9 cm (72\")    Body mass index is 27.37 kg/m².   HEAD: atraumatic, normocephalic  RESP " SYSTEM: not in respiratory distress, breathing unlabored  CARDIOVASULAR: normal rate, no edema noted   NEURO: Alert and oriented x 3, mood and affect appeared appropriate      DATA REVIEWED:  The 90-day PAP compliance summary downloaded on 7/16/2024 has been reviewed independently by me and discussed with the patient.   Compliance: 98%  More than 4 hr use: 98%  Average use of the device: 7 hours 25 minutes per night  Residual AHI: 1.5/hr (goal < 5.0 /hr)  Device: ResMed AirSense 11  Mask type: Face  DME: [employer]          ASSESSMENT AND PLAN:  Obstructive Sleep Apnea: sleep apnea has improved with the device and treatment.  Patient has excellent compliance with the device for treatment of sleep apnea.  I have personally reviewed the smart card download and discussed the download data with the patient and encouarged continued use of the device.  The residual AHI is acceptable. The device is benefiting the patient and the device is medically necessary.  Recommend patient get supplies from the DME company, change them on a regular basis, and clean as directed.  A prescription for supplies has been sent to the [facility].  Recommend continued usage of PAP device, most insurances require minimum usage of 4 hours per night at least 70% of the time, would recommend using all night every night if possible for optimum health.  Recommend prioritizing sleep to aid in overall health.  Do not drive or operate heavy machinery or do activities that require high concentration if feeling tired or drowsy.  Discussed CPAP pillow with patient.  He is also interested in mask fitting per DME company.  Order placed.  Overweight: Body mass index is 27.37 kg/m².. Patients who are overweight or obese are at increased risk of sleep apnea/ sleep disordered breathing. Weight reduction and healthy lifestyle are encouraged in overweight/ obese patients as part of a comprehensive approach to sleep apnea treatment.    DOT license guerrier: Patient  has a history of sleep apnea which is being treated with auto CPAP.  I have seen the patient today and had a face-to-face conversation.  I have also reviewed the smartcard download from the device which shows good compliance.  Patient's symptoms have resolved with the use of the device.  Patient does not have any excessive daytime sleepiness and is also getting adequate sleep as per the download.  Denies any issues whatsoever staying awake at work or with driving.  The residual AHI is acceptable.  I have encourage the patient to continue to use the device as it is benefiting the patient in treating sleep apnea and also controlling symptoms.  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  Patient aware not to drive or operate heavy machinery if feeling tired or drowsy.      Patient will follow-up in 1 year or follow-up sooner for any issues or concerns.  Patient's questions were answered.          Thank you for allowing me to participate in the care of this patient.     Mariza Levine DNP, APRN  Psychiatric Sleep Medicine

## 2024-07-17 ENCOUNTER — OFFICE VISIT (OUTPATIENT)
Dept: INTERNAL MEDICINE | Age: 69
End: 2024-07-17
Payer: MEDICARE

## 2024-07-17 ENCOUNTER — OFFICE VISIT (OUTPATIENT)
Dept: SLEEP MEDICINE | Facility: HOSPITAL | Age: 69
End: 2024-07-17
Payer: MEDICARE

## 2024-07-17 VITALS
HEART RATE: 86 BPM | RESPIRATION RATE: 18 BRPM | SYSTOLIC BLOOD PRESSURE: 120 MMHG | HEIGHT: 72 IN | TEMPERATURE: 97.5 F | DIASTOLIC BLOOD PRESSURE: 80 MMHG | BODY MASS INDEX: 27.09 KG/M2 | WEIGHT: 200 LBS | OXYGEN SATURATION: 98 %

## 2024-07-17 VITALS — HEART RATE: 64 BPM | BODY MASS INDEX: 27.33 KG/M2 | OXYGEN SATURATION: 98 % | HEIGHT: 72 IN | WEIGHT: 201.8 LBS

## 2024-07-17 DIAGNOSIS — Z02.89 ENCOUNTER FOR COMPLETION OF FORM WITH PATIENT: ICD-10-CM

## 2024-07-17 DIAGNOSIS — T17.308A CHOKING, INITIAL ENCOUNTER: ICD-10-CM

## 2024-07-17 DIAGNOSIS — R13.10 DYSPHAGIA, UNSPECIFIED TYPE: ICD-10-CM

## 2024-07-17 DIAGNOSIS — J02.9 PHARYNGITIS, UNSPECIFIED ETIOLOGY: Primary | ICD-10-CM

## 2024-07-17 DIAGNOSIS — E66.3 OVERWEIGHT (BMI 25.0-29.9): ICD-10-CM

## 2024-07-17 DIAGNOSIS — G47.33 SEVERE OBSTRUCTIVE SLEEP APNEA: Primary | ICD-10-CM

## 2024-07-17 PROCEDURE — G2211 COMPLEX E/M VISIT ADD ON: HCPCS

## 2024-07-17 PROCEDURE — 1125F AMNT PAIN NOTED PAIN PRSNT: CPT

## 2024-07-17 PROCEDURE — 1160F RVW MEDS BY RX/DR IN RCRD: CPT

## 2024-07-17 PROCEDURE — 1160F RVW MEDS BY RX/DR IN RCRD: CPT | Performed by: NURSE PRACTITIONER

## 2024-07-17 PROCEDURE — G0463 HOSPITAL OUTPT CLINIC VISIT: HCPCS

## 2024-07-17 PROCEDURE — 1159F MED LIST DOCD IN RCRD: CPT

## 2024-07-17 PROCEDURE — 99214 OFFICE O/P EST MOD 30 MIN: CPT | Performed by: NURSE PRACTITIONER

## 2024-07-17 PROCEDURE — 1159F MED LIST DOCD IN RCRD: CPT | Performed by: NURSE PRACTITIONER

## 2024-07-17 PROCEDURE — 99213 OFFICE O/P EST LOW 20 MIN: CPT

## 2024-07-17 RX ORDER — METHYLPREDNISOLONE 4 MG/1
TABLET ORAL
Qty: 21 TABLET | Refills: 0 | Status: SHIPPED | OUTPATIENT
Start: 2024-07-17

## 2024-07-17 NOTE — PROGRESS NOTES
"    I N T E R N A L  M E D I C I N E  Candi Cedeno, APRN    ENCOUNTER DATE:  07/17/2024    Luca Carolina . / 68 y.o. / male      CHIEF COMPLAINT / REASON FOR OFFICE VISIT     Sore Throat      ASSESSMENT & PLAN     Diagnoses and all orders for this visit:    1. Pharyngitis, unspecified etiology (Primary)  -     methylPREDNISolone (MEDROL) 4 MG dose pack; Take as directed on package instructions.  Dispense: 21 tablet; Refill: 0    2. Dysphagia, unspecified type  -     Ambulatory Referral to General Surgery    3. Choking, initial encounter  -     Ambulatory Referral to General Surgery         SUMMARY/DISCUSSION  Continues with unilateral sore throat after completing antibiotics/ steroid dose pack, along with some episodes of choking with known GERD.  Will treat with steroid dose pack until he can be evaluated by ENT next week.  No visible throat abscess seen but dicussed signs/ symptoms of abscess for which he would need to be evaluated within ER.  Acknowledged understanding.    Rcommend EGD given GERD with dysphagia.  He will provide our office with an update after his ENT appointment next week.        Next Appointment with me: Visit date not found    Return for Next scheduled follow up.      VITAL SIGNS     Visit Vitals  /80   Pulse 86   Temp 97.5 °F (36.4 °C)   Resp 18   Ht 182.9 cm (72.01\")   Wt 90.7 kg (200 lb)   SpO2 98%   BMI 27.12 kg/m²             Wt Readings from Last 3 Encounters:   07/17/24 90.7 kg (200 lb)   07/17/24 91.5 kg (201 lb 12.8 oz)   07/02/24 88.5 kg (195 lb)     Body mass index is 27.12 kg/m².        MEDICATIONS AT THE TIME OF OFFICE VISIT     Current Outpatient Medications on File Prior to Visit   Medication Sig Dispense Refill    aspirin 81 MG EC tablet Take 1 tablet by mouth Daily.      atorvastatin (LIPITOR) 40 MG tablet TAKE 1 TABLET BY MOUTH DAILY 90 tablet 1    fexofenadine (ALLEGRA) 180 MG tablet Take 1 tablet by mouth Daily.      metFORMIN ER (GLUCOPHAGE-XR) 500 MG 24 hr " tablet TAKE 1 TABLET BY MOUTH DAILY WITH DINNER. 90 tablet 1    pantoprazole (PROTONIX) 40 MG EC tablet TAKE ONE TABLET BY MOUTH DAILY (Patient taking differently: 1 tablet Daily.) 90 tablet 3    promethazine-dextromethorphan (PROMETHAZINE-DM) 6.25-15 MG/5ML syrup 5 ml PO Q 4-6 hours prn cough.  Max:  30 ml per 24 hours. 240 mL 0    sertraline (ZOLOFT) 50 MG tablet TAKE 1 TABLET BY MOUTH DAILY 90 tablet 1    Triamcinolone Acetonide (NASACORT) 55 MCG/ACT nasal inhaler 2 sprays into the nostril(s) as directed by provider Daily.       No current facility-administered medications on file prior to visit.        HISTORY OF PRESENT ILLNESS     Recently seen at  on July 2, 2024 for acute URI.  Treated with Augmentin 875 BID x 10 days, steroid dose pack, Promethazine DM.  Reports his sinus congestion symptoms improved after antibiotic/ steroid treatment but continues with sore throat, most noticeably on right side.  No fever, chills, dyspnea, cough.  In last couple weeks, he has had some episodes of choking with swallowing.  Prior history of dysphagia/ GERD but no EGD.  On pantoprazole 40 mg daily.  Non smoker.  Has upcoming ENT appointment this Tuesday.      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  No GERD observed.        Patient Care Team:  Christiano Dewey MD as PCP - General (Internal Medicine)    REVIEW OF SYSTEMS     Review of Systems   Constitutional:  Negative for chills, fever and unexpected weight change.   HENT:  Positive for sore throat.    Respiratory:  Negative for cough, chest tightness and shortness of breath.    Cardiovascular:  Negative for chest pain, palpitations and leg swelling.   Neurological:  Negative for dizziness, weakness, light-headedness and headaches.   Psychiatric/Behavioral:  The patient is not nervous/anxious.            PHYSICAL EXAMINATION     Physical Exam  Vitals reviewed.   Constitutional:       General: He is not in acute distress.     Appearance: Normal appearance. He is not ill-appearing, toxic-appearing or diaphoretic.   HENT:      Head: Normocephalic and atraumatic.      Right Ear: Tympanic membrane, ear canal and external ear normal. There is no impacted cerumen.      Left Ear: Tympanic membrane, ear canal and external ear normal. There is no impacted cerumen.      Nose: Nose normal. No congestion or rhinorrhea.      Mouth/Throat:      Mouth: Mucous membranes are moist.      Pharynx: Oropharynx is clear. Posterior oropharyngeal erythema present. No oropharyngeal exudate.   Cardiovascular:      Rate and Rhythm: Normal rate and regular rhythm.      Heart sounds: Normal heart sounds.   Pulmonary:      Effort: Pulmonary effort is normal.      Breath sounds: Normal breath sounds.   Neurological:      Mental Status: He is alert and oriented to person, place, and time. Mental status is at baseline.   Psychiatric:         Mood and Affect: Mood normal.         Behavior: Behavior normal.         Thought Content: Thought content normal.         Judgment: Judgment normal.           REVIEWED DATA     Labs:           Imaging:            Medical Tests:           Summary of old records / correspondence / consultant report:           Request outside records:

## 2024-07-20 RX ORDER — ATORVASTATIN CALCIUM 40 MG/1
40 TABLET, FILM COATED ORAL DAILY
Qty: 90 TABLET | Refills: 0 | Status: SHIPPED | OUTPATIENT
Start: 2024-07-20

## 2024-08-08 DIAGNOSIS — R73.03 PREDIABETES: ICD-10-CM

## 2024-08-08 RX ORDER — METFORMIN HYDROCHLORIDE 500 MG/1
500 TABLET, EXTENDED RELEASE ORAL
Qty: 90 TABLET | Refills: 1 | Status: SHIPPED | OUTPATIENT
Start: 2024-08-08

## 2024-08-11 DIAGNOSIS — K21.9 GASTROESOPHAGEAL REFLUX DISEASE, UNSPECIFIED WHETHER ESOPHAGITIS PRESENT: ICD-10-CM

## 2024-08-11 DIAGNOSIS — K21.9 LARYNGOPHARYNGEAL REFLUX (LPR): ICD-10-CM

## 2024-08-12 RX ORDER — PANTOPRAZOLE SODIUM 40 MG/1
40 TABLET, DELAYED RELEASE ORAL DAILY
Qty: 90 TABLET | Refills: 1 | Status: SHIPPED | OUTPATIENT
Start: 2024-08-12

## 2024-09-30 NOTE — ASSESSMENT & PLAN NOTE
Lab Results   Component Value Date    LDL 99 07/13/2023    LDL 86 04/11/2023    LDL 88 10/10/2022    TRIG 79 07/13/2023    CHOLHDLRATIO 3.00 07/13/2023      Continue atorvastatin 40 mg daily. Goal LDL cholesterol < 70.   
Lab Results   Component Value Date    PSA 3.5 10/10/2023    PSA 2.980 04/11/2023    PSA 2.2 04/08/2022      Increased PSA velocity. Advised him to see Dr. Gilliam regarding this issue along with inguinal hernia. He states he knows him personally and will be sure to see him formally for evaluation.   
Reviewed prior EGD and upper GI series findings. If symptoms continue/worsen recommended seeing GI for further evaluation. He expressed understanding and agreed to follow above instructions.   
marijuana - currently 6-8 blunts every night for the past 2 weeks (unemployed) but use to smoke 2 blunts every night when working

## (undated) DEVICE — BITEBLOCK OMNI BLOC

## (undated) DEVICE — TUBING, SUCTION, 1/4" X 10', STRAIGHT: Brand: MEDLINE

## (undated) DEVICE — LN SMPL CO2 SHTRM SD STREAM W/M LUER

## (undated) DEVICE — ADHS SKIN SURG TISS VISC PREMIERPRO EXOFIN HI/VISC FAST/DRY

## (undated) DEVICE — COLUMN DRAPE

## (undated) DEVICE — ARM DRAPE

## (undated) DEVICE — CANN O2 ETCO2 FITS ALL CONN CO2 SMPL A/ 7IN DISP LF

## (undated) DEVICE — THE TORRENT IRRIGATION SCOPE CONNECTOR IS USED WITH THE TORRENT IRRIGATION TUBING TO PROVIDE IRRIGATION FLUIDS SUCH AS STERILE WATER DURING GASTROINTESTINAL ENDOSCOPIC PROCEDURES WHEN USED IN CONJUNCTION WITH AN IRRIGATION PUMP (OR ELECTROSURGICAL UNIT).: Brand: TORRENT

## (undated) DEVICE — SENSR O2 OXIMAX FNGR A/ 18IN NONSTR

## (undated) DEVICE — VIOLET BRAIDED (POLYGLACTIN 910), SYNTHETIC ABSORBABLE SUTURE: Brand: COATED VICRYL

## (undated) DEVICE — ST TBG AIRSEAL BIF FLTR W/ACT/CHARCOAL/FLTR

## (undated) DEVICE — LOU GENERAL ROBOT: Brand: MEDLINE INDUSTRIES, INC.

## (undated) DEVICE — SOL ANTISTICK CAUTRY ELECTROLUBE LF

## (undated) DEVICE — CANN NASL CO2 TRULINK W/O2 A/

## (undated) DEVICE — TIP COVER ACCESSORY

## (undated) DEVICE — COVER,MAYO STAND,STERILE: Brand: MEDLINE

## (undated) DEVICE — SUT MNCRYL PLS ANTIB UD 4/0 PS2 18IN

## (undated) DEVICE — ADAPT CLN BIOGUARD AIR/H2O DISP

## (undated) DEVICE — Device: Brand: DEFENDO AIR/WATER/SUCTION AND BIOPSY VALVE

## (undated) DEVICE — TROC BLADLES AIRSEAL/OPTI THRD 8X120MM 1P/U

## (undated) DEVICE — LAPAROVUE VISIBILITY SYSTEM LAPAROSCOPIC SOLUTIONS: Brand: LAPAROVUE

## (undated) DEVICE — LAPAROSCOPIC SMOKE ELIMINATION DEVICE: Brand: PNEUVIEW XE

## (undated) DEVICE — THE STERILE LIGHT HANDLE COVER IS USED WITH STERIS SURGICAL LIGHTING AND VISUALIZATION SYSTEMS.

## (undated) DEVICE — PATIENT RETURN ELECTRODE, SINGLE-USE, CONTACT QUALITY MONITORING, ADULT, WITH 9FT CORD, FOR PATIENTS WEIGING OVER 33LBS. (15KG): Brand: MEGADYNE

## (undated) DEVICE — GLV SURG SENSICARE PI MIC PF SZ6.5 LF STRL

## (undated) DEVICE — KT ORCA ORCAPOD DISP STRL

## (undated) DEVICE — SEAL

## (undated) DEVICE — FRCP BX RADJAW4 NDL 2.8 240CM LG OG BX40

## (undated) DEVICE — BLADELESS OBTURATOR: Brand: WECK VISTA